# Patient Record
Sex: MALE | Race: WHITE | Employment: OTHER | URBAN - METROPOLITAN AREA
[De-identification: names, ages, dates, MRNs, and addresses within clinical notes are randomized per-mention and may not be internally consistent; named-entity substitution may affect disease eponyms.]

---

## 2017-02-06 DIAGNOSIS — E03.9 HYPOTHYROIDISM, UNSPECIFIED TYPE: ICD-10-CM

## 2017-02-24 ENCOUNTER — OFFICE VISIT (OUTPATIENT)
Dept: SURGERY | Age: 77
End: 2017-02-24

## 2017-02-24 VITALS
HEIGHT: 72 IN | BODY MASS INDEX: 31.15 KG/M2 | HEART RATE: 72 BPM | WEIGHT: 230 LBS | SYSTOLIC BLOOD PRESSURE: 112 MMHG | DIASTOLIC BLOOD PRESSURE: 70 MMHG

## 2017-02-24 DIAGNOSIS — I10 ESSENTIAL HYPERTENSION: ICD-10-CM

## 2017-02-24 DIAGNOSIS — E03.9 HYPOTHYROIDISM, UNSPECIFIED TYPE: ICD-10-CM

## 2017-02-24 DIAGNOSIS — B35.1 ONYCHOMYCOSIS: ICD-10-CM

## 2017-02-24 LAB — GLUCOSE BLD-MCNC: 142 MG/DL

## 2017-02-24 PROCEDURE — 1036F TOBACCO NON-USER: CPT | Performed by: INTERNAL MEDICINE

## 2017-02-24 PROCEDURE — 3044F HG A1C LEVEL LT 7.0%: CPT | Performed by: INTERNAL MEDICINE

## 2017-02-24 PROCEDURE — G8598 ASA/ANTIPLAT THER USED: HCPCS | Performed by: INTERNAL MEDICINE

## 2017-02-24 PROCEDURE — 1123F ACP DISCUSS/DSCN MKR DOCD: CPT | Performed by: INTERNAL MEDICINE

## 2017-02-24 PROCEDURE — G8427 DOCREV CUR MEDS BY ELIG CLIN: HCPCS | Performed by: INTERNAL MEDICINE

## 2017-02-24 PROCEDURE — G8484 FLU IMMUNIZE NO ADMIN: HCPCS | Performed by: INTERNAL MEDICINE

## 2017-02-24 PROCEDURE — 4040F PNEUMOC VAC/ADMIN/RCVD: CPT | Performed by: INTERNAL MEDICINE

## 2017-02-24 PROCEDURE — 99214 OFFICE O/P EST MOD 30 MIN: CPT | Performed by: INTERNAL MEDICINE

## 2017-02-24 PROCEDURE — G8417 CALC BMI ABV UP PARAM F/U: HCPCS | Performed by: INTERNAL MEDICINE

## 2017-02-24 PROCEDURE — 3017F COLORECTAL CA SCREEN DOC REV: CPT | Performed by: INTERNAL MEDICINE

## 2017-02-24 PROCEDURE — 82962 GLUCOSE BLOOD TEST: CPT | Performed by: INTERNAL MEDICINE

## 2017-02-24 RX ORDER — LEVOTHYROXINE SODIUM 137 UG/1
TABLET ORAL
Qty: 30 TABLET | Refills: 6 | Status: SHIPPED | OUTPATIENT
Start: 2017-02-24 | End: 2017-08-24 | Stop reason: SDUPTHER

## 2017-02-24 RX ORDER — SIMVASTATIN 20 MG
TABLET ORAL
Qty: 30 TABLET | Refills: 6 | Status: SHIPPED | OUTPATIENT
Start: 2017-02-24 | End: 2017-08-24 | Stop reason: SDUPTHER

## 2017-02-24 RX ORDER — CLOPIDOGREL BISULFATE 75 MG/1
75 TABLET ORAL DAILY
Qty: 30 TABLET | Refills: 6 | Status: SHIPPED | OUTPATIENT
Start: 2017-02-24 | End: 2017-08-24 | Stop reason: SDUPTHER

## 2017-02-24 RX ORDER — LISINOPRIL AND HYDROCHLOROTHIAZIDE 20; 12.5 MG/1; MG/1
TABLET ORAL
Qty: 30 TABLET | Refills: 6 | Status: SHIPPED | OUTPATIENT
Start: 2017-02-24 | End: 2017-08-24 | Stop reason: SDUPTHER

## 2017-03-05 ASSESSMENT — ENCOUNTER SYMPTOMS: EYES NEGATIVE: 1

## 2017-08-12 ENCOUNTER — HOSPITAL ENCOUNTER (OUTPATIENT)
Dept: ULTRASOUND IMAGING | Age: 77
Discharge: HOME | End: 2017-08-12
Payer: MEDICARE

## 2017-08-12 DIAGNOSIS — I65.23 BILATERAL CAROTID ARTERY STENOSIS: ICD-10-CM

## 2017-08-12 PROCEDURE — 93880 EXTRACRANIAL BILAT STUDY: CPT

## 2017-08-22 DIAGNOSIS — E11.69 UNCONTROLLED TYPE 2 DIABETES MELLITUS WITH OTHER SPECIFIED COMPLICATION, UNSPECIFIED LONG TERM INSULIN USE STATUS: ICD-10-CM

## 2017-08-22 DIAGNOSIS — E11.65 UNCONTROLLED TYPE 2 DIABETES MELLITUS WITH OTHER SPECIFIED COMPLICATION, UNSPECIFIED LONG TERM INSULIN USE STATUS: ICD-10-CM

## 2017-08-22 DIAGNOSIS — E03.9 HYPOTHYROIDISM, UNSPECIFIED TYPE: ICD-10-CM

## 2017-08-22 LAB
ALBUMIN SERPL-MCNC: 4.2 G/DL (ref 3.9–4.9)
ALP BLD-CCNC: 49 U/L (ref 35–104)
ALT SERPL-CCNC: 17 U/L (ref 0–41)
ANION GAP SERPL CALCULATED.3IONS-SCNC: 18 MEQ/L (ref 7–13)
AST SERPL-CCNC: 15 U/L (ref 0–40)
BILIRUB SERPL-MCNC: 0.6 MG/DL (ref 0–1.2)
BILIRUBIN DIRECT: 0 MG/DL (ref 0–0.3)
BILIRUBIN, INDIRECT: 0.6 MG/DL (ref 0–0.6)
BUN BLDV-MCNC: 17 MG/DL (ref 8–23)
CALCIUM SERPL-MCNC: 9.3 MG/DL (ref 8.6–10.2)
CHLORIDE BLD-SCNC: 94 MEQ/L (ref 98–107)
CHOLESTEROL, TOTAL: 187 MG/DL (ref 0–199)
CO2: 26 MEQ/L (ref 22–29)
CREAT SERPL-MCNC: 0.91 MG/DL (ref 0.7–1.2)
CREATININE URINE: 85.4 MG/DL
GFR AFRICAN AMERICAN: >60
GFR NON-AFRICAN AMERICAN: >60
GLUCOSE BLD-MCNC: 140 MG/DL (ref 74–109)
HDLC SERPL-MCNC: 48 MG/DL (ref 40–59)
LDL CHOLESTEROL CALCULATED: 114 MG/DL (ref 0–129)
MICROALBUMIN UR-MCNC: <1.2 MG/DL
MICROALBUMIN/CREAT UR-RTO: NORMAL MG/G (ref 0–30)
POTASSIUM SERPL-SCNC: 5.1 MEQ/L (ref 3.5–5.1)
SODIUM BLD-SCNC: 138 MEQ/L (ref 132–144)
T4 FREE: 1.15 NG/DL (ref 0.93–1.7)
TOTAL PROTEIN: 7.1 G/DL (ref 6.4–8.1)
TRIGL SERPL-MCNC: 127 MG/DL (ref 0–200)
TSH SERPL DL<=0.05 MIU/L-ACNC: 4.68 UIU/ML (ref 0.27–4.2)

## 2017-08-24 ENCOUNTER — OFFICE VISIT (OUTPATIENT)
Dept: SURGERY | Age: 77
End: 2017-08-24

## 2017-08-24 VITALS
SYSTOLIC BLOOD PRESSURE: 139 MMHG | DIASTOLIC BLOOD PRESSURE: 71 MMHG | HEART RATE: 62 BPM | BODY MASS INDEX: 31.69 KG/M2 | WEIGHT: 234 LBS | HEIGHT: 72 IN

## 2017-08-24 DIAGNOSIS — E03.9 HYPOTHYROIDISM, UNSPECIFIED TYPE: ICD-10-CM

## 2017-08-24 DIAGNOSIS — E78.5 HYPERLIPIDEMIA, UNSPECIFIED HYPERLIPIDEMIA TYPE: ICD-10-CM

## 2017-08-24 DIAGNOSIS — I10 ESSENTIAL HYPERTENSION: ICD-10-CM

## 2017-08-24 DIAGNOSIS — E11.65 UNCONTROLLED TYPE 2 DIABETES MELLITUS WITH OTHER SPECIFIED COMPLICATION, UNSPECIFIED LONG TERM INSULIN USE STATUS: Primary | ICD-10-CM

## 2017-08-24 DIAGNOSIS — E11.69 UNCONTROLLED TYPE 2 DIABETES MELLITUS WITH OTHER SPECIFIED COMPLICATION, UNSPECIFIED LONG TERM INSULIN USE STATUS: Primary | ICD-10-CM

## 2017-08-24 LAB
GLUCOSE BLD-MCNC: 139 MG/DL
HBA1C MFR BLD: 6.7 %

## 2017-08-24 PROCEDURE — 1036F TOBACCO NON-USER: CPT | Performed by: INTERNAL MEDICINE

## 2017-08-24 PROCEDURE — 82962 GLUCOSE BLOOD TEST: CPT | Performed by: INTERNAL MEDICINE

## 2017-08-24 PROCEDURE — G8417 CALC BMI ABV UP PARAM F/U: HCPCS | Performed by: INTERNAL MEDICINE

## 2017-08-24 PROCEDURE — 99213 OFFICE O/P EST LOW 20 MIN: CPT | Performed by: INTERNAL MEDICINE

## 2017-08-24 PROCEDURE — 4040F PNEUMOC VAC/ADMIN/RCVD: CPT | Performed by: INTERNAL MEDICINE

## 2017-08-24 PROCEDURE — 83036 HEMOGLOBIN GLYCOSYLATED A1C: CPT | Performed by: INTERNAL MEDICINE

## 2017-08-24 PROCEDURE — G8598 ASA/ANTIPLAT THER USED: HCPCS | Performed by: INTERNAL MEDICINE

## 2017-08-24 PROCEDURE — G8428 CUR MEDS NOT DOCUMENT: HCPCS | Performed by: INTERNAL MEDICINE

## 2017-08-24 PROCEDURE — 1123F ACP DISCUSS/DSCN MKR DOCD: CPT | Performed by: INTERNAL MEDICINE

## 2017-08-24 RX ORDER — LISINOPRIL AND HYDROCHLOROTHIAZIDE 20; 12.5 MG/1; MG/1
TABLET ORAL
Qty: 30 TABLET | Refills: 6 | Status: SHIPPED | OUTPATIENT
Start: 2017-08-24 | End: 2018-02-26 | Stop reason: SDUPTHER

## 2017-08-24 RX ORDER — SIMVASTATIN 20 MG
TABLET ORAL
Qty: 30 TABLET | Refills: 6 | Status: SHIPPED | OUTPATIENT
Start: 2017-08-24 | End: 2018-02-26 | Stop reason: SDUPTHER

## 2017-08-24 RX ORDER — CLOPIDOGREL BISULFATE 75 MG/1
75 TABLET ORAL DAILY
Qty: 30 TABLET | Refills: 6 | Status: SHIPPED | OUTPATIENT
Start: 2017-08-24 | End: 2018-02-26 | Stop reason: SDUPTHER

## 2017-08-24 RX ORDER — LEVOTHYROXINE SODIUM 137 UG/1
TABLET ORAL
Qty: 30 TABLET | Refills: 6 | Status: SHIPPED | OUTPATIENT
Start: 2017-08-24 | End: 2018-02-26 | Stop reason: SDUPTHER

## 2017-09-03 ASSESSMENT — ENCOUNTER SYMPTOMS: EYES NEGATIVE: 1

## 2018-02-23 DIAGNOSIS — E11.69 UNCONTROLLED TYPE 2 DIABETES MELLITUS WITH OTHER SPECIFIED COMPLICATION, UNSPECIFIED LONG TERM INSULIN USE STATUS: ICD-10-CM

## 2018-02-23 DIAGNOSIS — E03.9 HYPOTHYROIDISM, UNSPECIFIED TYPE: ICD-10-CM

## 2018-02-23 DIAGNOSIS — E11.65 UNCONTROLLED TYPE 2 DIABETES MELLITUS WITH OTHER SPECIFIED COMPLICATION, UNSPECIFIED LONG TERM INSULIN USE STATUS: ICD-10-CM

## 2018-02-23 LAB
ANION GAP SERPL CALCULATED.3IONS-SCNC: 14 MEQ/L (ref 7–13)
BUN BLDV-MCNC: 17 MG/DL (ref 8–23)
CALCIUM SERPL-MCNC: 9 MG/DL (ref 8.6–10.2)
CHLORIDE BLD-SCNC: 100 MEQ/L (ref 98–107)
CHOLESTEROL, TOTAL: 173 MG/DL (ref 0–199)
CO2: 26 MEQ/L (ref 22–29)
CREAT SERPL-MCNC: 1.1 MG/DL (ref 0.7–1.2)
GFR AFRICAN AMERICAN: >60
GFR NON-AFRICAN AMERICAN: >60
GLUCOSE BLD-MCNC: 148 MG/DL (ref 74–109)
HBA1C MFR BLD: 7 % (ref 4.8–5.9)
HDLC SERPL-MCNC: 45 MG/DL (ref 40–59)
LDL CHOLESTEROL CALCULATED: 108 MG/DL (ref 0–129)
POTASSIUM SERPL-SCNC: 4.5 MEQ/L (ref 3.5–5.1)
SODIUM BLD-SCNC: 140 MEQ/L (ref 132–144)
T4 FREE: 1.3 NG/DL (ref 0.93–1.7)
TRIGL SERPL-MCNC: 102 MG/DL (ref 0–200)
TSH SERPL DL<=0.05 MIU/L-ACNC: 1.75 UIU/ML (ref 0.27–4.2)

## 2018-02-26 ENCOUNTER — OFFICE VISIT (OUTPATIENT)
Dept: ENDOCRINOLOGY | Age: 78
End: 2018-02-26
Payer: MEDICARE

## 2018-02-26 VITALS
WEIGHT: 238 LBS | HEIGHT: 72 IN | OXYGEN SATURATION: 98 % | TEMPERATURE: 98 F | HEART RATE: 76 BPM | DIASTOLIC BLOOD PRESSURE: 76 MMHG | SYSTOLIC BLOOD PRESSURE: 138 MMHG | BODY MASS INDEX: 32.23 KG/M2

## 2018-02-26 DIAGNOSIS — E78.00 HYPERCHOLESTEROLEMIA: ICD-10-CM

## 2018-02-26 DIAGNOSIS — I10 ESSENTIAL HYPERTENSION: ICD-10-CM

## 2018-02-26 DIAGNOSIS — E11.69 UNCONTROLLED TYPE 2 DIABETES MELLITUS WITH OTHER SPECIFIED COMPLICATION, UNSPECIFIED LONG TERM INSULIN USE STATUS: Primary | ICD-10-CM

## 2018-02-26 DIAGNOSIS — E11.65 UNCONTROLLED TYPE 2 DIABETES MELLITUS WITH OTHER SPECIFIED COMPLICATION, UNSPECIFIED LONG TERM INSULIN USE STATUS: Primary | ICD-10-CM

## 2018-02-26 DIAGNOSIS — E03.9 HYPOTHYROIDISM, UNSPECIFIED TYPE: ICD-10-CM

## 2018-02-26 LAB — GLUCOSE BLD-MCNC: 200 MG/DL

## 2018-02-26 PROCEDURE — G8417 CALC BMI ABV UP PARAM F/U: HCPCS | Performed by: INTERNAL MEDICINE

## 2018-02-26 PROCEDURE — 1036F TOBACCO NON-USER: CPT | Performed by: INTERNAL MEDICINE

## 2018-02-26 PROCEDURE — G8598 ASA/ANTIPLAT THER USED: HCPCS | Performed by: INTERNAL MEDICINE

## 2018-02-26 PROCEDURE — 1123F ACP DISCUSS/DSCN MKR DOCD: CPT | Performed by: INTERNAL MEDICINE

## 2018-02-26 PROCEDURE — 99214 OFFICE O/P EST MOD 30 MIN: CPT | Performed by: INTERNAL MEDICINE

## 2018-02-26 PROCEDURE — G8427 DOCREV CUR MEDS BY ELIG CLIN: HCPCS | Performed by: INTERNAL MEDICINE

## 2018-02-26 PROCEDURE — 4040F PNEUMOC VAC/ADMIN/RCVD: CPT | Performed by: INTERNAL MEDICINE

## 2018-02-26 PROCEDURE — 82962 GLUCOSE BLOOD TEST: CPT | Performed by: INTERNAL MEDICINE

## 2018-02-26 PROCEDURE — G8484 FLU IMMUNIZE NO ADMIN: HCPCS | Performed by: INTERNAL MEDICINE

## 2018-02-26 RX ORDER — CLOPIDOGREL BISULFATE 75 MG/1
75 TABLET ORAL DAILY
Qty: 30 TABLET | Refills: 6 | Status: ON HOLD | OUTPATIENT
Start: 2018-02-26 | End: 2018-08-16 | Stop reason: HOSPADM

## 2018-02-26 RX ORDER — LEVOTHYROXINE SODIUM 137 UG/1
TABLET ORAL
Qty: 30 TABLET | Refills: 6 | Status: SHIPPED | OUTPATIENT
Start: 2018-02-26 | End: 2018-09-13 | Stop reason: SDUPTHER

## 2018-02-26 RX ORDER — LISINOPRIL AND HYDROCHLOROTHIAZIDE 20; 12.5 MG/1; MG/1
TABLET ORAL
Qty: 30 TABLET | Refills: 6 | Status: SHIPPED | OUTPATIENT
Start: 2018-02-26 | End: 2018-09-13 | Stop reason: SDUPTHER

## 2018-02-26 RX ORDER — SIMVASTATIN 20 MG
TABLET ORAL
Qty: 30 TABLET | Refills: 6 | Status: SHIPPED | OUTPATIENT
Start: 2018-02-26 | End: 2018-09-13 | Stop reason: SDUPTHER

## 2018-02-26 RX ORDER — CLOPIDOGREL BISULFATE 75 MG/1
75 TABLET ORAL DAILY
Qty: 30 TABLET | Refills: 6 | Status: SHIPPED | OUTPATIENT
Start: 2018-02-26 | End: 2018-02-26 | Stop reason: SDUPTHER

## 2018-02-26 RX ORDER — LEVOTHYROXINE SODIUM 137 UG/1
TABLET ORAL
Qty: 30 TABLET | Refills: 6 | Status: SHIPPED | OUTPATIENT
Start: 2018-02-26 | End: 2018-02-26 | Stop reason: SDUPTHER

## 2018-02-26 RX ORDER — SIMVASTATIN 20 MG
TABLET ORAL
Qty: 30 TABLET | Refills: 6 | Status: SHIPPED | OUTPATIENT
Start: 2018-02-26 | End: 2018-02-26 | Stop reason: SDUPTHER

## 2018-02-26 ASSESSMENT — ENCOUNTER SYMPTOMS: EYES NEGATIVE: 1

## 2018-02-26 NOTE — PROGRESS NOTES
normal. He has no wheezes. He has no rales. Musculoskeletal: Normal range of motion. Neurological: He is alert and oriented to person, place, and time. Skin: Skin is warm and dry. Psychiatric: He exhibits a depressed mood. Pt declined foot exam       Results for Carley Davidson (MRN [id number]) as of 9/3/2017 18:04   Ref. Range 8/22/2017 07:18   Sodium Latest Ref Range: 132 - 144 mEq/L 138   Potassium Latest Ref Range: 3.5 - 5.1 mEq/L 5.1   Chloride Latest Ref Range: 98 - 107 mEq/L 94 (L)   CO2 Latest Ref Range: 22 - 29 mEq/L 26   BUN Latest Ref Range: 8 - 23 mg/dL 17   Creatinine Latest Ref Range: 0.70 - 1.20 mg/dL 0.91   Anion Gap Latest Ref Range: 7 - 13 mEq/L 18 (H)   GFR Non- Latest Ref Range: >60  >60.0   GFR African American Latest Ref Range: >60  >60.0   Glucose Latest Ref Range: 74 - 109 mg/dL 140 (H)   Calcium Latest Ref Range: 8.6 - 10.2 mg/dL 9.3   Total Protein Latest Ref Range: 6.4 - 8.1 g/dL 7.1   Cholesterol, Total Latest Ref Range: 0 - 199 mg/dL 187   HDL Cholesterol Latest Ref Range: 40 - 59 mg/dL 48   LDL Calculated Latest Ref Range: 0 - 129 mg/dL 114   Triglycerides Latest Ref Range: 0 - 200 mg/dL 127   Albumin Latest Ref Range: 3.9 - 4.9 g/dL 4.2   Alk Phos Latest Ref Range: 35 - 104 U/L 49   ALT Latest Ref Range: 0 - 41 U/L 17   AST Latest Ref Range: 0 - 40 U/L 15   Bilirubin Latest Ref Range: 0.0 - 1.2 mg/dL 0.6   Bilirubin, Direct Latest Ref Range: 0.0 - 0.3 mg/dL 0.0   Bilirubin, Indirect Latest Ref Range: 0.0 - 0.6 mg/dL 0.6   TSH Latest Ref Range: 0.270 - 4.200 uIU/mL 4.680 (H)   T4 Free Latest Ref Range: 0.93 - 1.70 ng/dL 1.15   Creatinine, Ur Latest Ref Range: Not Established mg/dL 85.4   Microalb Creat Ratio Latest Ref Range: 0.0 - 30.0 mg/G see below   MICROALBUMIN, RANDOM URINE Latest Ref Range: Not Established mg/dL <1.20       Assessment:      1.  Uncontrolled type 2 diabetes mellitus with other specified complication, unspecified long term insulin use status ([facility]  POCT Glucose    Basic Metabolic Panel    Hemoglobin A1C    Lipid Panel    Microalbumin / Creatinine Urine Ratio    Hepatic Function Panel    Referral To Podiatry - ([facility]) Lionel Cordova DPM   2. Hypothyroidism, unspecified type  T4, Free    TSH without Reflex   3. Essential hypertension     4.  Hypercholesterolemia           Plan:     Orders Placed This Encounter   Medications    simvastatin (ZOCOR) 20 MG tablet     Sig: TAKE ONE TABLET BY MOUTH NIGHTLY     Dispense:  30 tablet     Refill:  06    metFORMIN (GLUCOPHAGE) 1000 MG tablet     Sig: TAKE ONE TABLET BY MOUTH TWICE DAILY WITH MEALS     Dispense:  60 tablet     Refill:  06    levothyroxine (SYNTHROID) 137 MCG tablet     Sig: TAKE ONE TABLET BY MOUTH ONCE DAILY     Dispense:  30 tablet     Refill:  06    clopidogrel (PLAVIX) 75 MG tablet     Sig: Take 1 tablet by mouth daily     Dispense:  30 tablet     Refill:  06       Orders Placed This Encounter   Procedures    T4, Free     Standing Status:   Future     Standing Expiration Date:   2/26/2019    TSH without Reflex     Standing Status:   Future     Standing Expiration Date:   2/26/2019    Basic Metabolic Panel     Standing Status:   Future     Standing Expiration Date:   2/26/2019    Hemoglobin A1C     Standing Status:   Future     Standing Expiration Date:   2/26/2019    Lipid Panel     Standing Status:   Future     Standing Expiration Date:   2/26/2019     Order Specific Question:   Is Patient Fasting?/# of Hours     Answer:   y    Microalbumin / Creatinine Urine Ratio     Standing Status:   Future     Standing Expiration Date:   2/26/2019    Hepatic Function Panel     Standing Status:   Future     Standing Expiration Date:   2/26/2019    Referral To Podiatry - ([facility]UR[facility]) Lionel Cordova DPM     Referral Priority:   Routine     Referral Type:   Consult for Advice and Opinion     Referral Reason:   Specialty Services Required     Referred to Provider:   Lionel Cordova DPM     Requested Specialty:   Podiatry     Number of Visits Requested:   1    POCT Glucose     F/u in 6 months  Diabetes education provided today:  More than 50 % of 25 min spend in pt education/counsleing review of labs and c0-ordination of care   Managing high and low sugar readings.   habic goal of 7 or lower

## 2018-08-14 ENCOUNTER — HOSPITAL ENCOUNTER (INPATIENT)
Age: 78
LOS: 3 days | Discharge: NURSING, REHAB OR LONG-TERM CARE | DRG: 086 | End: 2018-08-17
Attending: EMERGENCY MEDICINE | Admitting: INTERNAL MEDICINE
Payer: MEDICARE

## 2018-08-14 ENCOUNTER — APPOINTMENT (OUTPATIENT)
Dept: CT IMAGING | Age: 78
DRG: 086 | End: 2018-08-14
Payer: MEDICARE

## 2018-08-14 DIAGNOSIS — S01.81XA FACIAL LACERATION, INITIAL ENCOUNTER: Primary | ICD-10-CM

## 2018-08-14 DIAGNOSIS — S20.212A CONTUSION OF LEFT CHEST WALL, INITIAL ENCOUNTER: ICD-10-CM

## 2018-08-14 DIAGNOSIS — S00.83XA FACIAL CONTUSION, INITIAL ENCOUNTER: ICD-10-CM

## 2018-08-14 DIAGNOSIS — S02.2XXA CLOSED FRACTURE OF NASAL BONE, INITIAL ENCOUNTER: ICD-10-CM

## 2018-08-14 DIAGNOSIS — S06.5XAA SUBDURAL HEMATOMA: ICD-10-CM

## 2018-08-14 LAB
ABO/RH: NORMAL
ALBUMIN SERPL-MCNC: 4.2 G/DL (ref 3.9–4.9)
ALP BLD-CCNC: 53 U/L (ref 35–104)
ALT SERPL-CCNC: 22 U/L (ref 0–41)
ANION GAP SERPL CALCULATED.3IONS-SCNC: 16 MEQ/L (ref 7–13)
ANTIBODY SCREEN: NORMAL
AST SERPL-CCNC: 24 U/L (ref 0–40)
BASOPHILS ABSOLUTE: 0 K/UL (ref 0–0.2)
BASOPHILS RELATIVE PERCENT: 0.3 %
BILIRUB SERPL-MCNC: 0.5 MG/DL (ref 0–1.2)
BILIRUBIN URINE: NEGATIVE
BLOOD, URINE: NEGATIVE
BUN BLDV-MCNC: 14 MG/DL (ref 8–23)
CALCIUM SERPL-MCNC: 8.8 MG/DL (ref 8.6–10.2)
CHLORIDE BLD-SCNC: 91 MEQ/L (ref 98–107)
CLARITY: CLEAR
CO2: 23 MEQ/L (ref 22–29)
COLOR: YELLOW
CREAT SERPL-MCNC: 0.88 MG/DL (ref 0.7–1.2)
EKG ATRIAL RATE: 87 BPM
EKG P AXIS: 52 DEGREES
EKG P-R INTERVAL: 192 MS
EKG Q-T INTERVAL: 428 MS
EKG QRS DURATION: 152 MS
EKG QTC CALCULATION (BAZETT): 515 MS
EKG R AXIS: -36 DEGREES
EKG T AXIS: 122 DEGREES
EKG VENTRICULAR RATE: 87 BPM
EOSINOPHILS ABSOLUTE: 0 K/UL (ref 0–0.7)
EOSINOPHILS RELATIVE PERCENT: 0 %
GFR AFRICAN AMERICAN: >60
GFR NON-AFRICAN AMERICAN: >60
GLOBULIN: 2.9 G/DL (ref 2.3–3.5)
GLUCOSE BLD-MCNC: 214 MG/DL (ref 74–109)
GLUCOSE URINE: 250 MG/DL
HCT VFR BLD CALC: 42.5 % (ref 42–52)
HEMOGLOBIN: 14.8 G/DL (ref 14–18)
INR BLD: 1
KETONES, URINE: >=80 MG/DL
LEUKOCYTE ESTERASE, URINE: NEGATIVE
LYMPHOCYTES ABSOLUTE: 0.9 K/UL (ref 1–4.8)
LYMPHOCYTES RELATIVE PERCENT: 7 %
MCH RBC QN AUTO: 30.9 PG (ref 27–31.3)
MCHC RBC AUTO-ENTMCNC: 34.8 % (ref 33–37)
MCV RBC AUTO: 88.8 FL (ref 80–100)
MONOCYTES ABSOLUTE: 0.7 K/UL (ref 0.2–0.8)
MONOCYTES RELATIVE PERCENT: 5.3 %
NEUTROPHILS ABSOLUTE: 11.7 K/UL (ref 1.4–6.5)
NEUTROPHILS RELATIVE PERCENT: 87.4 %
NITRITE, URINE: NEGATIVE
PDW BLD-RTO: 13.8 % (ref 11.5–14.5)
PH UA: 5 (ref 5–9)
PLATELET # BLD: 240 K/UL (ref 130–400)
POC CREATININE WHOLE BLOOD: 1
POTASSIUM SERPL-SCNC: 4.5 MEQ/L (ref 3.5–5.1)
PROTEIN UA: NEGATIVE MG/DL
PROTHROMBIN TIME: 10.6 SEC (ref 9.6–12.3)
RBC # BLD: 4.78 M/UL (ref 4.7–6.1)
SODIUM BLD-SCNC: 130 MEQ/L (ref 132–144)
SPECIFIC GRAVITY UA: 1.03 (ref 1–1.03)
TOTAL PROTEIN: 7.1 G/DL (ref 6.4–8.1)
URINE REFLEX TO CULTURE: ABNORMAL
UROBILINOGEN, URINE: 0.2 E.U./DL
WBC # BLD: 13.4 K/UL (ref 4.8–10.8)

## 2018-08-14 PROCEDURE — 81003 URINALYSIS AUTO W/O SCOPE: CPT

## 2018-08-14 PROCEDURE — 6370000000 HC RX 637 (ALT 250 FOR IP): Performed by: NURSE PRACTITIONER

## 2018-08-14 PROCEDURE — 99285 EMERGENCY DEPT VISIT HI MDM: CPT

## 2018-08-14 PROCEDURE — 2000000000 HC ICU R&B

## 2018-08-14 PROCEDURE — 86850 RBC ANTIBODY SCREEN: CPT

## 2018-08-14 PROCEDURE — 74177 CT ABD & PELVIS W/CONTRAST: CPT

## 2018-08-14 PROCEDURE — 36415 COLL VENOUS BLD VENIPUNCTURE: CPT

## 2018-08-14 PROCEDURE — 2500000003 HC RX 250 WO HCPCS: Performed by: EMERGENCY MEDICINE

## 2018-08-14 PROCEDURE — 6370000000 HC RX 637 (ALT 250 FOR IP): Performed by: EMERGENCY MEDICINE

## 2018-08-14 PROCEDURE — 12011 RPR F/E/E/N/L/M 2.5 CM/<: CPT

## 2018-08-14 PROCEDURE — 6360000002 HC RX W HCPCS: Performed by: EMERGENCY MEDICINE

## 2018-08-14 PROCEDURE — 70450 CT HEAD/BRAIN W/O DYE: CPT

## 2018-08-14 PROCEDURE — 0HQ1XZZ REPAIR FACE SKIN, EXTERNAL APPROACH: ICD-10-PCS | Performed by: EMERGENCY MEDICINE

## 2018-08-14 PROCEDURE — 80053 COMPREHEN METABOLIC PANEL: CPT

## 2018-08-14 PROCEDURE — 86901 BLOOD TYPING SEROLOGIC RH(D): CPT

## 2018-08-14 PROCEDURE — 6360000004 HC RX CONTRAST MEDICATION: Performed by: EMERGENCY MEDICINE

## 2018-08-14 PROCEDURE — 2580000003 HC RX 258: Performed by: INTERNAL MEDICINE

## 2018-08-14 PROCEDURE — 96374 THER/PROPH/DIAG INJ IV PUSH: CPT

## 2018-08-14 PROCEDURE — 70486 CT MAXILLOFACIAL W/O DYE: CPT

## 2018-08-14 PROCEDURE — 71250 CT THORAX DX C-: CPT

## 2018-08-14 PROCEDURE — 85025 COMPLETE CBC W/AUTO DIFF WBC: CPT

## 2018-08-14 PROCEDURE — 2500000003 HC RX 250 WO HCPCS: Performed by: NEUROLOGICAL SURGERY

## 2018-08-14 PROCEDURE — 86900 BLOOD TYPING SEROLOGIC ABO: CPT

## 2018-08-14 PROCEDURE — 93005 ELECTROCARDIOGRAM TRACING: CPT

## 2018-08-14 PROCEDURE — 85610 PROTHROMBIN TIME: CPT

## 2018-08-14 RX ORDER — HYDRALAZINE HYDROCHLORIDE 20 MG/ML
10 INJECTION INTRAMUSCULAR; INTRAVENOUS
Status: DISCONTINUED | OUTPATIENT
Start: 2018-08-14 | End: 2018-08-17 | Stop reason: HOSPADM

## 2018-08-14 RX ORDER — SODIUM CHLORIDE 9 MG/ML
INJECTION, SOLUTION INTRAVENOUS CONTINUOUS
Status: DISCONTINUED | OUTPATIENT
Start: 2018-08-14 | End: 2018-08-15

## 2018-08-14 RX ORDER — 0.9 % SODIUM CHLORIDE 0.9 %
250 INTRAVENOUS SOLUTION INTRAVENOUS ONCE
Status: DISCONTINUED | OUTPATIENT
Start: 2018-08-14 | End: 2018-08-17 | Stop reason: HOSPADM

## 2018-08-14 RX ORDER — LABETALOL HYDROCHLORIDE 5 MG/ML
10 INJECTION, SOLUTION INTRAVENOUS
Status: DISCONTINUED | OUTPATIENT
Start: 2018-08-14 | End: 2018-08-17

## 2018-08-14 RX ORDER — HYDRALAZINE HYDROCHLORIDE 20 MG/ML
10 INJECTION INTRAMUSCULAR; INTRAVENOUS
Status: CANCELLED | OUTPATIENT
Start: 2018-08-14

## 2018-08-14 RX ORDER — ONDANSETRON 4 MG/1
4 TABLET, ORALLY DISINTEGRATING ORAL ONCE
Status: COMPLETED | OUTPATIENT
Start: 2018-08-14 | End: 2018-08-14

## 2018-08-14 RX ORDER — DIAPER,BRIEF,INFANT-TODD,DISP
EACH MISCELLANEOUS ONCE
Status: COMPLETED | OUTPATIENT
Start: 2018-08-14 | End: 2018-08-14

## 2018-08-14 RX ORDER — LIDOCAINE HYDROCHLORIDE AND EPINEPHRINE 10; 10 MG/ML; UG/ML
20 INJECTION, SOLUTION INFILTRATION; PERINEURAL ONCE
Status: COMPLETED | OUTPATIENT
Start: 2018-08-14 | End: 2018-08-14

## 2018-08-14 RX ORDER — HYDRALAZINE HYDROCHLORIDE 20 MG/ML
10 INJECTION INTRAMUSCULAR; INTRAVENOUS ONCE
Status: COMPLETED | OUTPATIENT
Start: 2018-08-14 | End: 2018-08-14

## 2018-08-14 RX ADMIN — LABETALOL HYDROCHLORIDE 10 MG: 5 INJECTION INTRAVENOUS at 23:34

## 2018-08-14 RX ADMIN — IOPAMIDOL 100 ML: 755 INJECTION, SOLUTION INTRAVENOUS at 17:29

## 2018-08-14 RX ADMIN — BACITRACIN ZINC 1 G: 500 OINTMENT TOPICAL at 18:57

## 2018-08-14 RX ADMIN — HYDRALAZINE HYDROCHLORIDE 10 MG: 20 INJECTION INTRAMUSCULAR; INTRAVENOUS at 23:33

## 2018-08-14 RX ADMIN — ONDANSETRON 4 MG: 4 TABLET, ORALLY DISINTEGRATING ORAL at 20:53

## 2018-08-14 RX ADMIN — SODIUM CHLORIDE: 9 INJECTION, SOLUTION INTRAVENOUS at 22:58

## 2018-08-14 RX ADMIN — PHENYLEPHRINE HYDROCHLORIDE 1 SPRAY: 1 SPRAY NASAL at 21:13

## 2018-08-14 RX ADMIN — HYDRALAZINE HYDROCHLORIDE 10 MG: 20 INJECTION INTRAMUSCULAR; INTRAVENOUS at 20:55

## 2018-08-14 RX ADMIN — LIDOCAINE HYDROCHLORIDE AND EPINEPHRINE 20 ML: 10; 10 INJECTION, SOLUTION INFILTRATION; PERINEURAL at 18:10

## 2018-08-14 RX ADMIN — HYDRALAZINE HYDROCHLORIDE 10 MG: 20 INJECTION INTRAMUSCULAR; INTRAVENOUS at 22:06

## 2018-08-14 ASSESSMENT — ENCOUNTER SYMPTOMS
NAUSEA: 0
SHORTNESS OF BREATH: 1
SORE THROAT: 0
VOMITING: 0
EYE PAIN: 0
FACIAL SWELLING: 1
TROUBLE SWALLOWING: 0
ABDOMINAL PAIN: 0
DOUBLE VISION: 0
COUGH: 0
HEARTBURN: 0
CHEST TIGHTNESS: 0
PHOTOPHOBIA: 0
BLURRED VISION: 0
ABDOMINAL DISTENTION: 0
VOICE CHANGE: 0

## 2018-08-14 ASSESSMENT — PAIN DESCRIPTION - PAIN TYPE: TYPE: ACUTE PAIN

## 2018-08-14 ASSESSMENT — PAIN SCALES - GENERAL
PAINLEVEL_OUTOF10: 0
PAINLEVEL_OUTOF10: 6
PAINLEVEL_OUTOF10: 0
PAINLEVEL_OUTOF10: 6

## 2018-08-14 ASSESSMENT — PAIN DESCRIPTION - LOCATION: LOCATION: CHEST

## 2018-08-14 ASSESSMENT — PAIN DESCRIPTION - ORIENTATION: ORIENTATION: LEFT;RIGHT

## 2018-08-14 NOTE — ED NOTES
Wounds cleansed with NS. Right eyebrow appears a little open. No active bleeding noted. Tolerated all well. Dr Trey Chery made aware.       Francis Claros RN  08/14/18 8184

## 2018-08-14 NOTE — ED TRIAGE NOTES
A & Ox4. Skin pink warm and dry. States he mowed the lawn this morning and overdid it. States he got dizzy in his driveway and fell face first onto the driveway around 4597. Wife got home around 1100 but pt didn't want to go to the hospital at that time. Pt took ASA 325mg x 2 for pain at 1130. Pt also on Plavix and ASA 81mg and took both this morning. Denies LOC. Pts right eye is ecchymotic all around it and almost swollen shut. States having some blurred vision in that eye. Superficial abrasions noted to right eyebrow, right side of forehead, nose, both knees, chin, lower lip. Ecchymosis noted inside inner lower lip. Large area of deep purple ecchymosis noted to left chest. Abrasion noted to umbilicus. Pt has ecchymosis and edema noted to left inner forearm/wrist but pt states he has no pain there. Complains of pain to right foot. No edema/ecchymosis noted to dorsal aspect of right foot. Pedal pulse palpable. MSP's intact x 4 extremities. States having feelings of choking when he lays back since this happened. Wife states he had clots of blood coming out of his left nostril.

## 2018-08-14 NOTE — ED PROVIDER NOTES
[facility] ED  eMERGENCY dEPARTMENT eNCOUnter      Pt Name: Anant Kruger  MRN: [id number]  Nohemigfdemetrice 1940  Date of evaluation: 8/14/2018  Provider: Guerline Guerra DO    CHIEF COMPLAINT       Chief Complaint   Patient presents with    Fall     Tripped in the driveway around 8945 today. On ASA and Plavix         HISTORY OF PRESENT ILLNESS   (Location/Symptom, Timing/Onset, Context/Setting, Quality, Duration, Modifying Factors, Severity)  Note limiting factors. Anant Kruger is a 69 y.o. male who presents to the emergency department . Patient presents after tripping face first on the driveway at 19:64 AM.  Prior to that he was doing some lawn work. He became dizzy almost like a spinning sensation and that is when he fell. Patient has history of a stroke in the past which has given him episodes of dizziness. This was not any different than previously. Patient did hit his head but did not lose consciousness. He does have a frontal headache and bilateral black eyes. He also hit his chest on the ground and is having some pain in the chest and some shortness of breath. Patient is on aspirin and Plavix    HPI    Nursing Notes were reviewed. REVIEW OF SYSTEMS    (2-9 systems for level 4, 10 or more for level 5)     Review of Systems   Constitutional: Negative for activity change, appetite change and fatigue. HENT: Positive for facial swelling. Negative for congestion, sore throat, trouble swallowing and voice change. Eyes: Negative for pain and visual disturbance. Respiratory: Positive for shortness of breath. Negative for chest tightness. Cardiovascular: Positive for chest pain. Gastrointestinal: Negative for abdominal distention, abdominal pain, nausea and vomiting. Endocrine: Negative for polydipsia. Genitourinary: Negative for flank pain and urgency. Musculoskeletal: Negative for gait problem and neck stiffness. Skin: Negative for rash.    Neurological: Positive for trauma    Interpretation per the Radiologist below, if available at the time of this note:    CT CHEST [address]    (Results Pending)   CT ABDOMEN PELVIS W IV CONTRAST Additional Contrast? None    (Results Pending)         ED BEDSIDE ULTRASOUND:   Performed by ED Physician - none    LABS:  Labs Reviewed   POCT CREATININE - Normal   CBC WITH AUTO DIFFERENTIAL   COMPREHENSIVE METABOLIC PANEL   URINE RT REFLEX TO CULTURE   PROTIME-INR   TYPE AND SCREEN       All other labs were within normal range or not returned as of this dictation. EMERGENCY DEPARTMENT COURSE and DIFFERENTIAL DIAGNOSIS/MDM:   Vitals:    Vitals:    08/14/18 1631 08/14/18 1715   BP: (!) 171/85 (!) 168/86   Pulse: 91 87   Resp: 18 18   Temp: 97.4 °F (36.3 °C)    TempSrc: Oral    SpO2: 98% 97%   Weight: 240 lb (108.9 kg)    Height: 6' (1.829 m)        Patient presented after getting dizzy and falling on his driveway. Patient was neurologically intact and his vitals are stable but he does have a subdural hematoma in the setting of taking aspirin and Plavix. He does not have a midline shift. He also has closed nasal fractures and chest wall contusion. Patient will be admitted to intensive care. We will control his blood pressure with hydralazine IV and we'll give him a platelet transfusion. He will have repeat CAT scan in a few hours to make sure that his subdural is not worsening. . I discussed this case with Dr Giancarlo Wilkerson, Neurosurgery at 8:30 PM.  He asked that patient be admitted to ICU under the hospitalist.  I spoke to Dr Viki Roche and he is aware of the above plan. MDM      REASSESSMENT          CRITICAL CARE TIME   Total Critical Care time was 45 minutes, excluding separately reportable procedures. There was a high probability of clinically significant/life threatening deterioration in the patient's condition which required my urgent intervention.       CONSULTS:  None    PROCEDURES:  Unless otherwise noted below, none     Lac

## 2018-08-14 NOTE — ED NOTES
Ice bag applied to right eye and also to left forearm and left chest     Sky Randolph RN  08/14/18 4372

## 2018-08-15 ENCOUNTER — APPOINTMENT (OUTPATIENT)
Dept: CT IMAGING | Age: 78
DRG: 086 | End: 2018-08-15
Payer: MEDICARE

## 2018-08-15 ENCOUNTER — APPOINTMENT (OUTPATIENT)
Dept: ULTRASOUND IMAGING | Age: 78
DRG: 086 | End: 2018-08-15
Payer: MEDICARE

## 2018-08-15 LAB
ANION GAP SERPL CALCULATED.3IONS-SCNC: 15 MEQ/L (ref 7–13)
ANISOCYTOSIS: ABNORMAL
BASOPHILS ABSOLUTE: 0 K/UL (ref 0–0.2)
BASOPHILS RELATIVE PERCENT: 0.2 %
BLOOD BANK DISPENSE STATUS: NORMAL
BLOOD BANK PRODUCT CODE: NORMAL
BPU ID: NORMAL
BUN BLDV-MCNC: 13 MG/DL (ref 8–23)
CALCIUM SERPL-MCNC: 8.4 MG/DL (ref 8.6–10.2)
CHLORIDE BLD-SCNC: 96 MEQ/L (ref 98–107)
CO2: 25 MEQ/L (ref 22–29)
CREAT SERPL-MCNC: 0.86 MG/DL (ref 0.7–1.2)
DESCRIPTION BLOOD BANK: NORMAL
EOSINOPHILS ABSOLUTE: 0 K/UL (ref 0–0.7)
EOSINOPHILS RELATIVE PERCENT: 0.5 %
GFR AFRICAN AMERICAN: >60
GFR AFRICAN AMERICAN: >60
GFR NON-AFRICAN AMERICAN: >60
GFR NON-AFRICAN AMERICAN: >60
GLUCOSE BLD-MCNC: 140 MG/DL (ref 74–109)
HCT VFR BLD CALC: 38.3 % (ref 42–52)
HCT VFR BLD CALC: 38.7 % (ref 42–52)
HCT VFR BLD CALC: 45.3 % (ref 42–52)
HEMOGLOBIN: 12.8 G/DL (ref 14–18)
HEMOGLOBIN: 13.3 G/DL (ref 14–18)
HEMOGLOBIN: 15.1 G/DL (ref 14–18)
LYMPHOCYTES ABSOLUTE: 0.8 K/UL (ref 1–4.8)
LYMPHOCYTES RELATIVE PERCENT: 6 %
MCH RBC QN AUTO: 30.6 PG (ref 27–31.3)
MCHC RBC AUTO-ENTMCNC: 34.3 % (ref 33–37)
MCV RBC AUTO: 89.3 FL (ref 80–100)
MONOCYTES ABSOLUTE: 0.9 K/UL (ref 0.2–0.8)
MONOCYTES RELATIVE PERCENT: 8.6 %
NEUTROPHILS ABSOLUTE: 8.1 K/UL (ref 1.4–6.5)
NEUTROPHILS RELATIVE PERCENT: 84 %
PDW BLD-RTO: 14.1 % (ref 11.5–14.5)
PERFORMED ON: NORMAL
PLATELET # BLD: 286 K/UL (ref 130–400)
PLATELET SLIDE REVIEW: ADEQUATE
POC CREATININE: 1 MG/DL (ref 0.8–1.3)
POC SAMPLE TYPE: NORMAL
POTASSIUM SERPL-SCNC: 4 MEQ/L (ref 3.5–5.1)
PROLYMPHOCYTES: 2 %
RBC # BLD: 4.33 M/UL (ref 4.7–6.1)
SLIDE REVIEW: ABNORMAL
SMUDGE CELLS: 1.9
SODIUM BLD-SCNC: 136 MEQ/L (ref 132–144)
WBC # BLD: 9.6 K/UL (ref 4.8–10.8)

## 2018-08-15 PROCEDURE — 36415 COLL VENOUS BLD VENIPUNCTURE: CPT

## 2018-08-15 PROCEDURE — 85018 HEMOGLOBIN: CPT

## 2018-08-15 PROCEDURE — 80048 BASIC METABOLIC PNL TOTAL CA: CPT

## 2018-08-15 PROCEDURE — 6370000000 HC RX 637 (ALT 250 FOR IP): Performed by: INTERNAL MEDICINE

## 2018-08-15 PROCEDURE — 85025 COMPLETE CBC W/AUTO DIFF WBC: CPT

## 2018-08-15 PROCEDURE — 85014 HEMATOCRIT: CPT

## 2018-08-15 PROCEDURE — P9034 PLATELETS, PHERESIS: HCPCS

## 2018-08-15 PROCEDURE — 70450 CT HEAD/BRAIN W/O DYE: CPT

## 2018-08-15 PROCEDURE — 2000000000 HC ICU R&B

## 2018-08-15 PROCEDURE — G8979 MOBILITY GOAL STATUS: HCPCS

## 2018-08-15 PROCEDURE — G8978 MOBILITY CURRENT STATUS: HCPCS

## 2018-08-15 PROCEDURE — 93880 EXTRACRANIAL BILAT STUDY: CPT

## 2018-08-15 PROCEDURE — 6360000002 HC RX W HCPCS: Performed by: EMERGENCY MEDICINE

## 2018-08-15 PROCEDURE — 36430 TRANSFUSION BLD/BLD COMPNT: CPT

## 2018-08-15 RX ORDER — ACETAMINOPHEN 325 MG/1
650 TABLET ORAL EVERY 4 HOURS PRN
Status: DISCONTINUED | OUTPATIENT
Start: 2018-08-15 | End: 2018-08-17 | Stop reason: HOSPADM

## 2018-08-15 RX ORDER — SIMVASTATIN 20 MG
20 TABLET ORAL NIGHTLY
Status: DISCONTINUED | OUTPATIENT
Start: 2018-08-15 | End: 2018-08-17 | Stop reason: HOSPADM

## 2018-08-15 RX ORDER — LEVOTHYROXINE SODIUM 137 UG/1
137 TABLET ORAL DAILY
Status: DISCONTINUED | OUTPATIENT
Start: 2018-08-16 | End: 2018-08-17 | Stop reason: HOSPADM

## 2018-08-15 RX ORDER — TRAMADOL HYDROCHLORIDE 50 MG/1
50 TABLET ORAL EVERY 6 HOURS PRN
Status: DISCONTINUED | OUTPATIENT
Start: 2018-08-15 | End: 2018-08-17 | Stop reason: HOSPADM

## 2018-08-15 RX ORDER — IBUPROFEN 800 MG/1
400 TABLET ORAL EVERY 6 HOURS PRN
Status: DISCONTINUED | OUTPATIENT
Start: 2018-08-15 | End: 2018-08-15

## 2018-08-15 RX ADMIN — HYDRALAZINE HYDROCHLORIDE 10 MG: 20 INJECTION INTRAMUSCULAR; INTRAVENOUS at 00:32

## 2018-08-15 RX ADMIN — TRAMADOL HYDROCHLORIDE 50 MG: 50 TABLET, FILM COATED ORAL at 22:08

## 2018-08-15 RX ADMIN — SIMVASTATIN 20 MG: 20 TABLET, FILM COATED ORAL at 22:11

## 2018-08-15 RX ADMIN — ACETAMINOPHEN 650 MG: 325 TABLET ORAL at 22:08

## 2018-08-15 RX ADMIN — IBUPROFEN 400 MG: 800 TABLET ORAL at 13:54

## 2018-08-15 RX ADMIN — HYDRALAZINE HYDROCHLORIDE 10 MG: 20 INJECTION INTRAMUSCULAR; INTRAVENOUS at 22:09

## 2018-08-15 ASSESSMENT — PAIN SCALES - GENERAL
PAINLEVEL_OUTOF10: 0
PAINLEVEL_OUTOF10: 4
PAINLEVEL_OUTOF10: 0
PAINLEVEL_OUTOF10: 3
PAINLEVEL_OUTOF10: 0
PAINLEVEL_OUTOF10: 3
PAINLEVEL_OUTOF10: 2
PAINLEVEL_OUTOF10: 0

## 2018-08-15 ASSESSMENT — PAIN DESCRIPTION - LOCATION: LOCATION: HEAD

## 2018-08-15 ASSESSMENT — PAIN DESCRIPTION - DESCRIPTORS: DESCRIPTORS: DULL

## 2018-08-15 ASSESSMENT — PAIN DESCRIPTION - ORIENTATION: ORIENTATION: OUTER

## 2018-08-15 ASSESSMENT — PAIN DESCRIPTION - PAIN TYPE: TYPE: ACUTE PAIN

## 2018-08-15 ASSESSMENT — PAIN DESCRIPTION - FREQUENCY: FREQUENCY: CONTINUOUS

## 2018-08-15 NOTE — PROGRESS NOTES
Physical Therapy Med Surg Initial Assessment  Facility/Department: [facility]  Room: [place] [address]       NAME: Nicole Kruger  : 1940 (69 y.o.)  MRN: [id number]  CODE STATUS: No Order    Date of Service: 8/15/2018    Patient Diagnosis(es): Post-traumatic subdural hematoma, without loss of consciousness, subsequent encounter [S06.5X0D]   Chief Complaint   Patient presents with    Fall     Tripped in the driveway around 4346 today. On ASA and Plavix     Patient Active Problem List    Diagnosis Date Noted    Post-traumatic subdural hematoma ([facility] 2018    Acute CVA (cerebrovascular accident) ([facility] 2015    Impaired mobility and activities of daily living 2015    Type II diabetes mellitus, uncontrolled ([facility] 2012    Hypothyroidism 2012    Hypertension 2012    Hyperlipidemia 2012        Past Medical History:   Diagnosis Date    Hyperlipidemia     Hypothyroidism     Type II or unspecified type diabetes mellitus without mention of complication, not stated as uncontrolled      History reviewed. No pertinent surgical history. Chart Reviewed: Yes  Patient assessed for rehabilitation services?: Yes  Family / Caregiver Present: No    Restrictions:  Restrictions/Precautions: Fall Risk     SUBJECTIVE: Subjective: Pt reports that he has experienced increased difficulty with homemaking chores and decribes a decline in LE strength, functional endurance and impaired balance with hx of recent fall leading to this admission. Pt reports that he is at 75% of his functional baseline  Pre Treatment Pain Screening  Pain at present: 4  Scale Used: Numeric Score  Intervention List: Patient able to continue with treatment  Comments / Details: \"they just gave me a motrin. \"    Post Treatment Pain Screening:   Pain Screening  Patient Currently in Pain: Yes  Pain Assessment  Pain Assessment: 0-10  Pain Level: 4  Pain Descriptors: Dull  Pain Frequency: Continuous    Prior Level of Function:  Social/Functional History  Lives With: Spouse  Type of Home: House  Home Layout: Two level (20 with hand rails)  Home Access: Stairs to enter with rails  Entrance Stairs - Number of Steps: 3  Entrance Stairs - Rails: Right  Bathroom Shower/Tub: Walk-in shower  Bathroom Equipment: Shower chair  Home Equipment:  (no DME)  ADL Assistance: [address]: [address] [facility] Responsibilities: Yes (shared)  Ambulation Assistance: Independent  Transfer Assistance: Independent    OBJECTIVE:   Vision/Hearing:  Vision:  (denies changes or double vision)  Hearing: Within functional limits    Cognition:  Overall Orientation Status: Within Functional Limits  Follows Commands: Within Functional Limits    Observation/Palpation  Observation: bruises round B orbits    ROM:       Strength:  Strength RLE  Comment: 4/5 grossly assessed  Strength LLE  Comment: 4/5 grossly assessed    Neuro:  Balance  Sitting - Static: Good  Sitting - Dynamic: Good;-  Standing - Static: Fair (increased postural sway requires 1-2 UE support)  Standing - Dynamic: Fair;- (2 LOB when amb with min A for balance recovery)  Comments: static standing with no UE support x21 seconds     Motor Control  Gross Motor?: WFL  Sensation  Overall Sensation Status: WFL    Bed mobility  Supine to Sit: Stand by assistance  Sit to Supine: Stand by assistance  Comment: close stand by. HOB elevated d/t aspiration precautions    Transfers  Sit to Stand: Contact guard assistance  Stand to sit: Contact guard assistance  Comment: x 3 trials from EOB and bedside chair. VC to improve indep and safe sequencing with 2ww. good follow through    Ambulation  Ambulation?: Yes  More Ambulation?: Yes  Ambulation 1  Surface: level tile  Device: Rolling Walker  Assistance: Minimal assistance  Distance: 25ft x 2  Comments: 1 LOB with turning requiring Min A to maintain balance. VC for safety with 2ww and for line management.    Ambulation 2  Surface - 2: level tile  Device 2: No device  Assistance 2: Minimal assistance  Quality of Gait 2: variable step length, narrow BASIM, decreased gait speed  Distance: 5ft, not safe at this time to continue amb without AD  Comments: Pt with LOB requiring Min A when amb with out AD    Activity Tolerance  Activity Tolerance: Patient Tolerated treatment well          ASSESSMENT:   Body structures, Functions, Activity limitations: Decreased functional mobility ; Decreased strength;Decreased endurance;Decreased balance;Decreased ROM; Decreased safe awareness  Decision Making: High Complexity  History: high  Exam: high  Clinical Presentation: high    Prognosis: Good  Patient Education: Pt educated in role of acute care PT, PT POC, benefits of mobility while in house, safety techniques, use of call light for assistance, d/c planning, d/c recommendation. , benefits of rehab program  Barriers to Learning: none    DISCHARGE RECOMMENDATIONS:  Discharge Recommendations: Continue to assess pending progress, Patient would benefit from continued therapy after discharge    Assessment: Pt presents with hx of recent fall. Pt reports difficulty with balance and LE strength. On this date, pt demonstrates the above deficits and decline in functional mobility status placing them at increased risk for falls and further decline in function. Pt would benefit from physical therapy to address above deficits and allow for safe return home at highest level of function, decrease risk for falls, and improve QOL.     REQUIRES PT FOLLOW UP: Yes      PLAN OF CARE:  Plan  Times per week: 3-6  Times per day: Daily  Current Treatment Recommendations: Strengthening, Functional Mobility Training, Neuromuscular Re-education, Home Exercise Program, Equipment Evaluation, Education, & procurement, Modalities, Safety Education & Training, Gait Training, Transfer Training, Balance Training, Endurance Training, Stair training, Patient/Caregiver Education & Training, Positioning  Safety Devices  Type of devices: All fall risk precautions in place    G-Code:  PT G-Codes  Functional Assessment Tool Used: clinical judgement  Functional Limitation: Mobility: Walking and moving around  Mobility: Walking and Moving Around Current Status (): At least 40 percent but less than 60 percent impaired, limited or restricted  Mobility: Walking and Moving Around Goal Status ():  At least 1 percent but less than 20 percent impaired, limited or restricted    Goals:  Patient goals : \"go home, improve balance, legs stronger\"  Short term goals  Short term goal 1: indep with HEP  Short term goal 2: standing balance activities with SBA and <1 LOB  Long term goals  Long term goal 1: indep with bed mobility   Long term goal 2: functional transfers with indep   Long term goal 3: amb >150ft with LRAD and mod I  Long term goal 4: 12 steps with handrail and Mod I    AMPAC (6 CLICK) BASIC MOBILITY  AM-PAC Inpatient Mobility Raw Score : 17     Therapy Time:   Individual   Time In 1430   Time Out 1448   Minutes [address], [address] [facility], 08/15/18 at 2:59 PM

## 2018-08-15 NOTE — H&P
Stephanie Kruger is an 69 y.o.  male. H/o CVA 3 years ago on plavix and ASA, was mowing his lawn and felt week and fell down on his face, pt states that he tripped in the driveway around 10 30 am. pt states he did not loose consciousness, no seizure like activity,  Pt states that since his stroke he has been weak and unsteady on his legs, he is been compliant with his meds for CVA. , pt was     Past Medical History:   Diagnosis Date    Hyperlipidemia     Hypothyroidism     Type II or unspecified type diabetes mellitus without mention of complication, not stated as uncontrolled      History reviewed. No pertinent surgical history. Allergies: No Known Allergies  History reviewed. No pertinent family history. Social History     Tobacco History     Smoking Status  Former Smoker    Smokeless Tobacco Use  Never Used          Alcohol History     Alcohol Use Status  Yes Comment  socially          Drug Use     Drug Use Status  No          Sexual Activity     Sexually Active  Not Asked                Active Problems:    Post-traumatic subdural hematoma (HCC)  Resolved Problems:    * No resolved hospital problems. *    Blood pressure (!) 163/74, pulse 84, temperature 97.4 °F (36.3 °C), temperature source Oral, resp. rate 18, height 6' (1.829 m), weight 240 lb (108.9 kg), SpO2 97 %. Review of Systems   Constitutional: Positive for malaise/fatigue. Negative for chills and fever. HENT: Negative for hearing loss and tinnitus. Eyes: Negative for blurred vision, double vision and photophobia. Respiratory: Negative for cough. Cardiovascular: Negative for chest pain and palpitations. Gastrointestinal: Negative for heartburn and nausea. Genitourinary: Negative for dysuria and urgency. Musculoskeletal: Negative for myalgias. Skin: Negative for rash. Neurological: Positive for dizziness and weakness. Psychiatric/Behavioral: Negative for depression.        Physical Exam   Constitutional: He is oriented to person, place, and time. He appears well-developed and well-nourished. HENT:   Laceration above the right eye,    Eyes: Pupils are equal, round, and reactive to light. Conjunctivae are normal.   Neck: No JVD present. No thyromegaly present. Cardiovascular: Normal rate. No murmur heard. Pulmonary/Chest: No respiratory distress. He has no wheezes. Abdominal: He exhibits no distension. There is no tenderness. Musculoskeletal: He exhibits no edema. Neurological: He is alert and oriented to person, place, and time. No cranial nerve deficit.    Skin:   Bruising of the chest and b/l knees and face     Lab Results   Component Value Date    WBC 13.4 (H) 08/14/2018    HGB 14.8 08/14/2018    HCT 42.5 08/14/2018    MCV 88.8 08/14/2018     08/14/2018     Lab Results   Component Value Date     08/14/2018    K 4.5 08/14/2018    CL 91 08/14/2018    CO2 23 08/14/2018    BUN 14 08/14/2018    CREATININE 0.88 08/14/2018    GLUCOSE 214 08/14/2018    CALCIUM 8.8 08/14/2018        Assessment:  Traumatic 9mm subdural hematoma  H/o CVA  HTN  B/l Nasal bone fractures  DM 2  Hyponatremia  Plan:  Transfuse 1 unit of platelets  ICU admission  NPO  Repeat CT head at 12 midnight   Neurosurgery consult  Hydralazine prn to keep systolic EY<467  ENT eval  SSI  IVF  Joselin Snell MD  8/14/2018

## 2018-08-15 NOTE — PROGRESS NOTES
2200 Escorted patient from ED to ICU. Patient removed from ED monitoring and placed on ICU monitor. BP is 179/60, medicated patient with Hydralazine per MAR. Will monitor BP every 15 minutes for response. Admission completed, shift assessment completed, see neuro check documentation. 2006 Blood bank call to question orders about platelet transfusion. Per lab she will order them. 2250 Platelets are not yet available. Dr. Crystal Crawford called back. Discussed BP with him, new orders received. Will continue to monitor. 2300 Medicated per MAr for BP.    0000 To CT scan. Platelets ready. First bag given. Second bag has a leak in it. Blood bank notified. Bag sent bag to blood bank. New bag ordered. 0100 CT scan results in. No new findings per CT report. 0200 Platelet transfusion completed, no s/sx of rxn noted. Patient tolerated well.    0300 VSS.    0400 Reassessment completed, see notes. No changes noted. No changes in neuro status. 0600 To CT scan, patient tolerated well.  Will report off to day shift starting at 0700

## 2018-08-15 NOTE — ED NOTES
Pt stable, icu rn at bedside, report in progress, pt stable, 0 c/o, 0 pain, a&ox4, consent for platelets administration obt.       Chester Jeong RN  08/14/18 3473

## 2018-08-15 NOTE — CONSULTS
signs.  CARDIOVASCULAR SYSTEM:  S1 and S2 are normal.  No murmurs appreciated. No  carotid bruits. RESPIRATORY SYSTEM:  Clear to auscultation. CNS EXAMINATION:  He is awake and alert and oriented to self, place, month,  and year. Pupils are equal and reactive. Eye movements are conjugate. Speech is normal.  Tongue is midline. Palate moves symmetrically. EXTREMITIES:  Examination reveals no pronator drift. Fine finger movements  are symmetrical.  Strength is 5/5. Reflexes are 2+. We did not attempt to  walk him yet. IMAGING:  His CT scan of the head shows an acute subdural hematoma present  along the left tentorium and falx measuring about 9 mm at the greatest  thickness and 3 mm at the level of the falx on the left. No midline shift  is notable. A small right scalp hematoma is notable. IMPRESSION:  Traumatic subdural hematoma with possibility of underlying  subarachnoid hemorrhage. The patient does not have any basal skull  fracture signs. This appeared to be a mechanical fall and therefore no  other intervention is recommended. We would hold his aspirin and Plavix  for four weeks, depending on what we see in the repeat scans. The patient  has high risk for cerebrovascular disease and stroke, and therefore earlier  it is reinstituted, the better it is. We will discuss this once he is out  of the ICU. We will see what his balance looks like. I think he was due  to have his carotid ultrasound done this month. We will obtain this while  he is here. I follow him as an outpatient on a regular basis. The patient has risk factors for cerebrovascular disease with hypertension,  hyperlipidemia, and diabetes. Followed by Dr. Yves Olguin as well. Thank you Dr. Arslan Green for asking us to assist in the care of this patient.         Isidoro Grant MD    D: 08/15/2018 8:46:03       T: 08/15/2018 11:52:50     [email or web address]  Job#: [id number]     Doc#: [id number]    CC:

## 2018-08-15 NOTE — ED NOTES
Per dr. Qian Roger ok to give ice chips to pt. Pt wilda. Well, pt a&ox4.      Ramon Freire RN  08/14/18 2053

## 2018-08-15 NOTE — PROGRESS NOTES
No new complaints. No nausea, vomiting, chest pain, or headache     BP (!) 118/51   Pulse 83   Temp 97.8 °F (36.6 °C)   Resp 19   Ht 6' (1.829 m)   Wt 240 lb (108.9 kg)   SpO2 96%   BMI 32.55 kg/m²     Physical Exam   Constitutional: He is oriented to person, place, and time and well-developed, well-nourished, and in no distress. Eyes: Conjunctivae and EOM are normal.   Neck: Normal range of motion. Neck supple. Neurological: He is alert and oriented to person, place, and time. Skin: Skin is warm and dry. S/p mech fall with subdural hematoma: neurosurg and neurology following. Htn: continue home meds, monitor bp     Pain sec to facial bone trauma, pain controlled with nsaid    Repeat imaging as indicated.       Following with neuro    35 minutes total care time, >1/2 in unit/floor time and care coordination

## 2018-08-15 NOTE — CONSULTS
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

## 2018-08-15 NOTE — CONSULTS
08/15/18   0037  08/15/18   0738   WBC  13.4*   --   9.6   HGB  14.8  15.1  13.3*   HCT  42.5  45.3  38.7*   MCV  88.8   --   89.3   PLT  240   --   286     Recent Labs      08/14/18   1645  08/14/18   1714   NA  130*   --    K  4.5   --    CL  91*   --    CO2  23   --    BUN  14   --    CREATININE  0.88  1.0     Recent Labs      08/14/18   1645   AST  24   ALT  22   BILITOT  0.5   ALKPHOS  53     No results for input(s): LIPASE, AMYLASE in the last 72 hours. Recent Labs      08/14/18   1645   PROT  7.1   INR  1.0     Ct Head Wo Contrast    Result Date: 8/14/2018  EXAMINATION: CT of the brain without contrast HISTORY: Fall. COMPARISON: TECHNIQUE: Multiple contiguous axial images were obtained of the brain from the skull base through the vertex. Multiplanar reformats were obtained. FINDINGS: Prominence of the sulci and ventricles is consistent with mild generalized parenchymal volume loss. Periventricular hypoattenuation is most consistent with chronic ischemic microangiopathy and patient this age. Remote left basal ganglia lacunar infarct. Gray-white matter differentiation is preserved. Acute subdural hematoma is present along the left tentorium and falx, measuring up to 9 mm in greatest thickness along the tentorium and 3 mm in thickness along the falx. No midline shift at this time. Basal cisterns are patent. No mass effect or midline  shift. The visualized paranasal sinuses and mastoid air cells are clear. Calvarium is intact. Small right frontal subcutaneous hematoma. 1. Acute subdural hematoma is present along the left tentorium and falx, measuring up to 9 mm in greatest thickness along the tentorium and 3 mm in greatest thickness along the falx to the left. No midline shift at this time. Findings discussed with Dr. Caceres Shelter by Dr. Lender Libman via telephone at approximately 2015 hours on 8/14/2018. 2. Small right frontal scalp hematoma.  All CT scans at this facility use dose modulation, iterative reconstruction, or mass. No consolidation, pleural effusion, or pneumothorax. Minimal bilateral dependent atelectasis. Airways are patent. No bronchiectasis. Visualized upper abdominal viscera appear within normal limits. Mild multilevel degenerative changes of the thoracic spine. No acute osseous abnormality. ABDOMEN/PELVIS: The liver, gallbladder, spleen, stomach, pancreas, and adrenal glands are within normal limits. The kidneys enhance uniformly. No enhancing renal lesion identified. A small area of cortical irregularity with 5 mm calcification is present of the anterior cortex of the inferior pole of the left kidney, likely due to remote insult. No urinary tract calculi or hydronephrosis. Urinary bladder is well distended. A 9 mm posterior lateral bladder diverticulum is noted adjacent to the left ureterovesicular junction. The prostate is enlarged and demonstrates a few internal calcifications. Abdominal aorta is nonaneurysmal and demonstrates scattered mild atherosclerotic calcification. No retroperitoneal or abdominal/pelvic lymphadenopathy. No small bowel obstruction. Colonic diverticulosis without diverticulitis. No overt colonic mass or pericolonic inflammation. Appendix is not definitively visualized. No free fluid or free air. Mild degenerative changes of the lumbar spine. No acute osseous abnormality of the abdomen and pelvis. CHEST: 1. Mild subcutaneous soft tissue edema along the anterior left chest wall. 2. No acute intrathoracic process seen on this noncontrast CT of the thorax. ABDOMEN/PELVIS: 1. No acute intra-abdominal process. 2. Colonic diverticulosis without diverticulitis. 3. Enlargement of the prostate. Additional incidental findings as above. All CT scans at this facility use dose modulation, iterative reconstruction, and/or weight based dosing when appropriate to reduce radiation dose to as low as reasonably achievable. Impression:   Status post fall with traumatic subdural hematoma. Clinically remained stable. Plan:   Patient was already given platelets which I agree and continue with blood pressure control. Patient is okay to transfer to regular floor from neurosurgical standpoint. regular diet activities instructions were given PT OT consult. Comments: Thank you for allowing us to participate in the care of this patient. Will continue to follow. Please call if questions or concerns arise.     Electronically signed by Oxana Harris MD on 8/15/2018 at 7:54 AM

## 2018-08-16 LAB
HCT VFR BLD CALC: 36.1 % (ref 42–52)
HCT VFR BLD CALC: 38.2 % (ref 42–52)
HCT VFR BLD CALC: 38.8 % (ref 42–52)
HEMOGLOBIN: 12.3 G/DL (ref 14–18)
HEMOGLOBIN: 13.1 G/DL (ref 14–18)
HEMOGLOBIN: 13.2 G/DL (ref 14–18)

## 2018-08-16 PROCEDURE — 97116 GAIT TRAINING THERAPY: CPT

## 2018-08-16 PROCEDURE — G8987 SELF CARE CURRENT STATUS: HCPCS

## 2018-08-16 PROCEDURE — 6360000002 HC RX W HCPCS: Performed by: INTERNAL MEDICINE

## 2018-08-16 PROCEDURE — 1210000000 HC MED SURG R&B

## 2018-08-16 PROCEDURE — G8988 SELF CARE GOAL STATUS: HCPCS

## 2018-08-16 PROCEDURE — 36415 COLL VENOUS BLD VENIPUNCTURE: CPT

## 2018-08-16 PROCEDURE — 85014 HEMATOCRIT: CPT

## 2018-08-16 PROCEDURE — 6370000000 HC RX 637 (ALT 250 FOR IP): Performed by: INTERNAL MEDICINE

## 2018-08-16 PROCEDURE — 85018 HEMOGLOBIN: CPT

## 2018-08-16 PROCEDURE — 97166 OT EVAL MOD COMPLEX 45 MIN: CPT

## 2018-08-16 PROCEDURE — 6360000002 HC RX W HCPCS: Performed by: EMERGENCY MEDICINE

## 2018-08-16 RX ORDER — HYDRALAZINE HYDROCHLORIDE 20 MG/ML
10 INJECTION INTRAMUSCULAR; INTRAVENOUS
Status: CANCELLED | OUTPATIENT
Start: 2018-08-16

## 2018-08-16 RX ORDER — SIMVASTATIN 20 MG
20 TABLET ORAL NIGHTLY
Status: CANCELLED | OUTPATIENT
Start: 2018-08-16

## 2018-08-16 RX ORDER — LABETALOL HYDROCHLORIDE 5 MG/ML
10 INJECTION, SOLUTION INTRAVENOUS
Status: CANCELLED | OUTPATIENT
Start: 2018-08-16

## 2018-08-16 RX ORDER — ONDANSETRON 2 MG/ML
4 INJECTION INTRAMUSCULAR; INTRAVENOUS EVERY 6 HOURS PRN
Status: CANCELLED | OUTPATIENT
Start: 2018-08-16

## 2018-08-16 RX ORDER — METOCLOPRAMIDE HYDROCHLORIDE 5 MG/ML
10 INJECTION INTRAMUSCULAR; INTRAVENOUS ONCE
Status: COMPLETED | OUTPATIENT
Start: 2018-08-16 | End: 2018-08-16

## 2018-08-16 RX ORDER — ACETAMINOPHEN 325 MG/1
650 TABLET ORAL EVERY 4 HOURS PRN
Status: CANCELLED | OUTPATIENT
Start: 2018-08-16

## 2018-08-16 RX ORDER — ONDANSETRON 2 MG/ML
4 INJECTION INTRAMUSCULAR; INTRAVENOUS EVERY 6 HOURS PRN
Status: DISCONTINUED | OUTPATIENT
Start: 2018-08-16 | End: 2018-08-17 | Stop reason: HOSPADM

## 2018-08-16 RX ORDER — TRAMADOL HYDROCHLORIDE 50 MG/1
50 TABLET ORAL EVERY 6 HOURS PRN
Status: CANCELLED | OUTPATIENT
Start: 2018-08-16

## 2018-08-16 RX ORDER — LORAZEPAM 2 MG/ML
0.5 INJECTION INTRAMUSCULAR ONCE
Status: COMPLETED | OUTPATIENT
Start: 2018-08-16 | End: 2018-08-16

## 2018-08-16 RX ADMIN — ONDANSETRON HYDROCHLORIDE 4 MG: 2 INJECTION, SOLUTION INTRAMUSCULAR; INTRAVENOUS at 06:47

## 2018-08-16 RX ADMIN — MAGNESIUM HYDROXIDE 30 ML: 400 SUSPENSION ORAL at 14:52

## 2018-08-16 RX ADMIN — LORAZEPAM 0.5 MG: 2 INJECTION INTRAMUSCULAR; INTRAVENOUS at 02:04

## 2018-08-16 RX ADMIN — TRAMADOL HYDROCHLORIDE 50 MG: 50 TABLET, FILM COATED ORAL at 20:33

## 2018-08-16 RX ADMIN — ACETAMINOPHEN 650 MG: 325 TABLET ORAL at 23:40

## 2018-08-16 RX ADMIN — HYDRALAZINE HYDROCHLORIDE 10 MG: 20 INJECTION INTRAMUSCULAR; INTRAVENOUS at 03:43

## 2018-08-16 RX ADMIN — METOCLOPRAMIDE 10 MG: 5 INJECTION, SOLUTION INTRAMUSCULAR; INTRAVENOUS at 10:31

## 2018-08-16 RX ADMIN — MAGNESIUM HYDROXIDE 30 ML: 400 SUSPENSION ORAL at 10:44

## 2018-08-16 RX ADMIN — SIMVASTATIN 20 MG: 20 TABLET, FILM COATED ORAL at 20:33

## 2018-08-16 ASSESSMENT — PAIN SCALES - GENERAL
PAINLEVEL_OUTOF10: 0
PAINLEVEL_OUTOF10: 6
PAINLEVEL_OUTOF10: 0
PAINLEVEL_OUTOF10: 4
PAINLEVEL_OUTOF10: 0
PAINLEVEL_OUTOF10: 10
PAINLEVEL_OUTOF10: 0
PAINLEVEL_OUTOF10: 0

## 2018-08-16 ASSESSMENT — PAIN DESCRIPTION - PAIN TYPE: TYPE: ACUTE PAIN

## 2018-08-16 ASSESSMENT — PAIN DESCRIPTION - DESCRIPTORS: DESCRIPTORS: ACHING

## 2018-08-16 ASSESSMENT — PAIN DESCRIPTION - LOCATION: LOCATION: HEAD

## 2018-08-16 NOTE — PROGRESS NOTES
premaxillary and perinasal soft tissue swelling. ACUTE SUBDURAL HEMATOMA PRESENT ALONG THE LEFT TENTORIUM AND FALX. UNCHANGED. OTHER FINDINGS DETAILED ABOVE All CT scans at this facility use dose modulation, iterative reconstruction, and/or weight based dosing when appropriate to reduce radiation dose to as low as reasonably achievable. Ct Head Wo Contrast    Result Date: 8/14/2018  EXAMINATION: CT of the brain without contrast HISTORY: Fall. COMPARISON: TECHNIQUE: Multiple contiguous axial images were obtained of the brain from the skull base through the vertex. Multiplanar reformats were obtained. FINDINGS: Prominence of the sulci and ventricles is consistent with mild generalized parenchymal volume loss. Periventricular hypoattenuation is most consistent with chronic ischemic microangiopathy and patient this age. Remote left basal ganglia lacunar infarct. Gray-white matter differentiation is preserved. Acute subdural hematoma is present along the left tentorium and falx, measuring up to 9 mm in greatest thickness along the tentorium and 3 mm in thickness along the falx. No midline shift at this time. Basal cisterns are patent. No mass effect or midline  shift. The visualized paranasal sinuses and mastoid air cells are clear. Calvarium is intact. Small right frontal subcutaneous hematoma. 1. Acute subdural hematoma is present along the left tentorium and falx, measuring up to 9 mm in greatest thickness along the tentorium and 3 mm in greatest thickness along the falx to the left. No midline shift at this time. Findings discussed with Dr. Morris Robles by Dr. Lender Libman via telephone at approximately 2015 hours on 8/14/2018. 2. Small right frontal scalp hematoma. All CT scans at this facility use dose modulation, iterative reconstruction, and/or weight based dosing when appropriate to reduce radiation dose to as low as reasonably achievable.     Ct Facial Bones Wo Contrast    Result Date: 8/15/2018  UNENHANCED CT irregularity with 5 mm calcification is present of the anterior cortex of the inferior pole of the left kidney, likely due to remote insult. No urinary tract calculi or hydronephrosis. Urinary bladder is well distended. A 9 mm posterior lateral bladder diverticulum is noted adjacent to the left ureterovesicular junction. The prostate is enlarged and demonstrates a few internal calcifications. Abdominal aorta is nonaneurysmal and demonstrates scattered mild atherosclerotic calcification. No retroperitoneal or abdominal/pelvic lymphadenopathy. No small bowel obstruction. Colonic diverticulosis without diverticulitis. No overt colonic mass or pericolonic inflammation. Appendix is not definitively visualized. No free fluid or free air. Mild degenerative changes of the lumbar spine. No acute osseous abnormality of the abdomen and pelvis. CHEST: 1. Mild subcutaneous soft tissue edema along the anterior left chest wall. 2. No acute intrathoracic process seen on this noncontrast CT of the thorax. ABDOMEN/PELVIS: 1. No acute intra-abdominal process. 2. Colonic diverticulosis without diverticulitis. 3. Enlargement of the prostate. Additional incidental findings as above. All CT scans at this facility use dose modulation, iterative reconstruction, and/or weight based dosing when appropriate to reduce radiation dose to as low as reasonably achievable. Us Carotid Artery Bilateral    Result Date: 8/16/2018  US CAROTID ARTERY BILATERAL: 8/16/2018 CLINICAL HISTORY:  STROKE . Fall, left subdural hematoma, and history of CVA COMPARISON: 8/12/2017. Grayscale, color and waveform Doppler analysis of the cervical carotid and vertebral arteries was performed. Validated velocity measurements with angiographic measurements, velocity criteria are extrapolated from diameter data as defined by the Society of Radiologist in [address] [place] [address] Radiology 2003; 780;696-806.  FINDINGS: There is mild atherosclerotic plaquing of

## 2018-08-16 NOTE — FLOWSHEET NOTE
1800: Patient worked with physical therapy. Patient reports weakness and nauseated. Refuses to go to Rehab today, states that he will go tomorrow, once he feel better. Per Alba Pierre transfer patient over to Rehab tomorrow. [phone] Talked to  and plan is to go over to Rehab tomorrow. Dr Ciara Moon notified.

## 2018-08-16 NOTE — PROGRESS NOTES
Vision:   [x]  [facility]   [] Impaired  Comments:    Reading glasses  Hearing:  [x] WFL   [] Impaired  Comments:    Sensation:   [x] WFL   []  Impaired   Comments:      Cognition:   [x] WFL   [] Impaired  Comments:    Communication:   [x] WFL   [] Impaired  Comments:    Hand Dominance: [x] Right   [] Left    Range of Motion:  R UE AROM/PROM: [x]  WFL [] Impaired  Comments:   L UE AROM/PROM:  [x]  WFL [] Impaired  Comments:     Strength:   R UE Strength: []1    [] 2   [] 2+   [] 3   [] 3+   [x] 4-   [] 4+  [] 5  Comments:   L UE Strength:  []1    [] 2   [] 2+   [] 3   [] 3+  [x] 4-   [] 4+   [] 5  Comments:     Quality of Movement:  [x] Good   [] Fair   [] Poor     Coordination:  Gross motor: [x] WFL   [] Impaired   Fine motor: [x] WFL   [] Impaired     Functional Mobility:  Toilet Transfers:  SBA  Bed Transfer:  Supervision  Sit to stand: CGA  Bed to Chair:  CGA    Seated Balance:      Static: [x] Good  [] Fair   [] Poor   Dynamic: []  Good  [x] Fair+   [] Poor     Standing Balance:     Static: [x] Good-   [] Fair  [] Poor   Dynamic: [] Good   [x] Fair   [] Poor     Functional Endurance: [] Good  [x] Fair-  [] Poor     ADLs  Feeding:   Positive hand to mouth  UE Dressing:  SBA  LB Dressing:  Min A  Bathing:  Min A  Toileting: SBA  Grooming: Set up    Treatment Plan will consist of:   [x] ADL Training   [x] Strengthening   [x] Endurance   [x] Transfer Training   [x]  DME ed      [x] HEP  [] Manual Therapy   [] AROM/PROM    [] Coordination   [] Cognitive Training   []Safety training   [] Other :    Goals:   Patient will:   [x]  Improve functional endurance to tolerate/complete 15-25 mins of ADL's   [x]  Be independent in UB ADLs    [x]  Be independent in LB ADLs   [x]  Be independent in ADL transfers without LOB   [x]  Be independent in toileting tasks   []  Improve   hand fine motor coordination to   in order to manage clothing fasteners/self-care containers in a timely manner   []  Improve   UE Function (AROM, strength,

## 2018-08-16 NOTE — PROGRESS NOTES
right nasal bones. There is some deviation nasal septum to the right. FRACTURES OF THE LEFT AND RIGHT NASAL BONES WITH ASSOCIATED OVERLYING PERINASAL SOFT TISSUE SWELLING. OTHER FINDINGS AS DETAILED ABOVE and. All CT scans at this facility use dose modulation, iterative reconstruction, and/or weight based dosing when appropriate to reduce radiation dose to as low as reasonably achievable. All    Ct Chest Wo Contrast    Result Date: 8/14/2018  EXAM: CT of the chest without contrast CT of the abdomen and pelvis with contrast History: Trauma. Fall. Technique: Multiple contiguous axial images were obtained of the thorax from the thoracic inlet through the upper abdomen without intravenous contrast. Multiplanar reformats were obtained. Multiple contiguous axial images of the abdomen and pelvis were obtained from the lung bases through the initial tuberosities after intravenous ministration of contrast Comparison: None available Findings: CHEST: Subcutaneous soft tissue edema is present along the left anterior chest wall. No axillary, mediastinal, or hilar lymphadenopathy. 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 No thoracic aortic aneurysm . Atherosclerotic calcification of the aortic arch. Heart size is within normal limits. No significant pericardial effusion. Coronary artery calcifications are noted. Esophagus is within normal limits. No pulmonary nodule or mass. No consolidation, pleural effusion, or pneumothorax. Minimal bilateral dependent atelectasis. Airways are patent. No bronchiectasis. Mild multilevel degenerative changes of the thoracic spine. No acute osseous abnormality. ABDOMEN/PELVIS: The liver, gallbladder, spleen, stomach, pancreas, and adrenal glands are within normal limits. The kidneys enhance uniformly. No enhancing renal lesion identified.  A small area of cortical irregularity with 5 mm calcification is present of the anterior cortex of the inferior pole of the left kidney, likely due to remote

## 2018-08-16 NOTE — PROGRESS NOTES
Physical Therapy Med Surg Daily Treatment Note  Facility/Department: [facility]  [facility]       NAME: Smita Beltran Day  : 1940 (69 y.o.)  MRN: [id number]  CODE STATUS: No Order    Date of Service: 2018    Patient Diagnosis(es): Post-traumatic subdural hematoma, without loss of consciousness, subsequent encounter [S06.5X0D]   Chief Complaint   Patient presents with    Fall     Tripped in the driveway around 0087 today. On ASA and Plavix     Patient Active Problem List    Diagnosis Date Noted    Post-traumatic subdural hematoma ([facility] 2018    Acute CVA (cerebrovascular accident) ([facility] 2015    Impaired mobility and activities of daily living 2015    Type II diabetes mellitus, uncontrolled ([facility] 2012    Hypothyroidism 2012    Hypertension 2012    Hyperlipidemia 2012        Past Medical History:   Diagnosis Date    Hyperlipidemia     Hypothyroidism     Type II or unspecified type diabetes mellitus without mention of complication, not stated as uncontrolled      History reviewed. No pertinent surgical history. Restrictions:  Restrictions/Precautions: Fall Risk    SUBJECTIVE:  Subjective  Subjective: I feel pretty good except for being nauseated. Pre Pain Assessment:  Pre Treatment Pain Screening  Pain at present: 4  Intervention List: Patient able to continue with treatment          Post Pain Assessment:   Pain Assessment  Pain Level: 4  Pain Type: Acute pain  Pain Location: Head  Pain Descriptors: Aching       OBJECTIVE:         Bed mobility  Supine to Sit: Supervision  Sit to Supine: Supervision  Comment: HOB slightly elevated; vc's for technique    Transfers  Sit to Stand: Stand by assistance  Stand to sit: Stand by assistance  Comment: vc's for hand placement; pt wanted to pull up from [employer].      Ambulation 1  Surface: level tile  Device: Rolling Walker  Assistance: Stand by assistance  Quality of Gait: Reciprocal, slow braulio, no LOB  Distance: 22'  Comments: Steady      Ambulation 2  Surface - 2: level tile  Device 2: No device  Assistance 2: Stand by assistance  Quality of Gait 2: NBOS, reciprocal, steady, no LOB  Distance: 25'    Neuromuscular Education  Neuromuscular Comments: standing balance activity - reaching 2-4\" OOBOS with confidence; trunk rotations, marching                           ASSESSMENT:  Body structures, Functions, Activity limitations: Decreased functional mobility ; Decreased strength;Decreased endurance;Decreased balance;Decreased ROM    Assessment: Good participation, Pt limited by nausea    Activity Tolerance  Activity Tolerance: Patient Tolerated treatment well       Discharge Recommendations:  Continue to assess pending progress, Patient would benefit from continued therapy after discharge    Goals:  Short term goals  Short term goal 1: indep with HEP  Short term goal 2: standing balance activities with SBA and <1 LOB  Long term goals  Long term goal 1: indep with bed mobility   Long term goal 2: functional transfers with indep   Long term goal 3: amb >150ft with LRAD and mod I  Long term goal 4: 12 steps with handrail and Mod I  Patient Goals   Patient goals : \"go home, improve balance, legs stronger\"    PLAN:   Plan  Times per week: 3-6  Times per day: Daily  Current Treatment Recommendations: Strengthening, Functional Mobility Training, Neuromuscular Re-education, Home Exercise Program, Equipment Evaluation, Education, & procurement, Modalities, Safety Education & Training, Gait Training, Transfer Training, Balance Training, Endurance Training, Stair training, Patient/Caregiver Education & Training, Positioning  Safety Devices  Type of devices:  All fall risk precautions in place, Call light within reach, Chair alarm in place, Left in chair     [facility] (6 CLICK) [address] Mobility Raw Score : 18      Therapy Time   Individual   Time In 1030   Time Out 1043   Minutes 13      gait - 10

## 2018-08-16 NOTE — PROGRESS NOTES
No new complaints. No nausea, vomiting, chest pain, or headache     BP (!) 134/48   Pulse 69   Temp 98 °F (36.7 °C) (Axillary)   Resp 15   Ht 6' (1.829 m)   Wt 240 lb (108.9 kg)   SpO2 96%   BMI 32.55 kg/m²     Physical Exam   Constitutional: He is oriented to person, place, and time and well-developed, well-nourished, and in no distress. Eyes: Conjunctivae and EOM are normal.   Neck: Normal range of motion. Neck supple. Neurological: He is alert and oriented to person, place, and time. Skin: Skin is warm and dry. S/p mech fall with subdural hematoma: neurosurg and neurology following. Htn: continue home meds, monitor bp     Pain sec to facial bone trauma, pain controlled with nsaid    Imaging results reviewed. Clinically doing well. No medical barriers to discharge.       25 minutes total care time, >1/2 in unit/floor time and care coordination

## 2018-08-17 ENCOUNTER — HOSPITAL ENCOUNTER (INPATIENT)
Age: 78
LOS: 6 days | Discharge: HOME | DRG: 950 | End: 2018-08-23
Attending: PHYSICAL MEDICINE & REHABILITATION | Admitting: PHYSICAL MEDICINE & REHABILITATION
Payer: MEDICARE

## 2018-08-17 VITALS
WEIGHT: 240 LBS | DIASTOLIC BLOOD PRESSURE: 72 MMHG | HEIGHT: 72 IN | OXYGEN SATURATION: 96 % | SYSTOLIC BLOOD PRESSURE: 150 MMHG | RESPIRATION RATE: 18 BRPM | TEMPERATURE: 98.2 F | HEART RATE: 59 BPM | BODY MASS INDEX: 32.51 KG/M2

## 2018-08-17 DIAGNOSIS — Z74.09 IMPAIRED MOBILITY AND ACTIVITIES OF DAILY LIVING: ICD-10-CM

## 2018-08-17 DIAGNOSIS — R26.9 ABNORMALITY OF GAIT AND MOBILITY: ICD-10-CM

## 2018-08-17 DIAGNOSIS — E11.65 TYPE 2 DIABETES MELLITUS WITH HYPERGLYCEMIA, WITH LONG-TERM CURRENT USE OF INSULIN (HCC): Primary | ICD-10-CM

## 2018-08-17 DIAGNOSIS — I69.90 LATE EFFECTS OF CVA (CEREBROVASCULAR ACCIDENT): ICD-10-CM

## 2018-08-17 DIAGNOSIS — Z79.4 TYPE 2 DIABETES MELLITUS WITH HYPERGLYCEMIA, WITH LONG-TERM CURRENT USE OF INSULIN (HCC): Primary | ICD-10-CM

## 2018-08-17 DIAGNOSIS — Z78.9 IMPAIRED MOBILITY AND ACTIVITIES OF DAILY LIVING: ICD-10-CM

## 2018-08-17 LAB
BILIRUBIN URINE: NEGATIVE
BLOOD, URINE: NEGATIVE
CLARITY: CLEAR
COLOR: YELLOW
GLUCOSE URINE: 500 MG/DL
HCT VFR BLD CALC: 37.1 % (ref 42–52)
HCT VFR BLD CALC: 38.8 % (ref 42–52)
HCT VFR BLD CALC: 39.6 % (ref 42–52)
HEMOGLOBIN: 12.9 G/DL (ref 14–18)
HEMOGLOBIN: 13 G/DL (ref 14–18)
HEMOGLOBIN: 13.8 G/DL (ref 14–18)
KETONES, URINE: ABNORMAL MG/DL
LEUKOCYTE ESTERASE, URINE: NEGATIVE
NITRITE, URINE: NEGATIVE
PH UA: 6 (ref 5–9)
PROTEIN UA: NEGATIVE MG/DL
SPECIFIC GRAVITY UA: 1.03 (ref 1–1.03)
UROBILINOGEN, URINE: 0.2 E.U./DL

## 2018-08-17 PROCEDURE — 2500000003 HC RX 250 WO HCPCS: Performed by: NEUROLOGICAL SURGERY

## 2018-08-17 PROCEDURE — 85018 HEMOGLOBIN: CPT

## 2018-08-17 PROCEDURE — 6370000000 HC RX 637 (ALT 250 FOR IP): Performed by: INTERNAL MEDICINE

## 2018-08-17 PROCEDURE — 36415 COLL VENOUS BLD VENIPUNCTURE: CPT

## 2018-08-17 PROCEDURE — 6360000002 HC RX W HCPCS: Performed by: INTERNAL MEDICINE

## 2018-08-17 PROCEDURE — 81003 URINALYSIS AUTO W/O SCOPE: CPT

## 2018-08-17 PROCEDURE — 6360000002 HC RX W HCPCS: Performed by: EMERGENCY MEDICINE

## 2018-08-17 PROCEDURE — 85014 HEMATOCRIT: CPT

## 2018-08-17 PROCEDURE — 87086 URINE CULTURE/COLONY COUNT: CPT

## 2018-08-17 PROCEDURE — 1180000000 HC REHAB R&B

## 2018-08-17 RX ORDER — ONDANSETRON 2 MG/ML
4 INJECTION INTRAMUSCULAR; INTRAVENOUS EVERY 6 HOURS PRN
Status: DISCONTINUED | OUTPATIENT
Start: 2018-08-17 | End: 2018-08-23 | Stop reason: HOSPADM

## 2018-08-17 RX ORDER — LANOLIN ALCOHOL/MO/W.PET/CERES
3 CREAM (GRAM) TOPICAL NIGHTLY PRN
Status: DISCONTINUED | OUTPATIENT
Start: 2018-08-17 | End: 2018-08-23 | Stop reason: HOSPADM

## 2018-08-17 RX ORDER — CALCIUM CARBONATE 200(500)MG
500 TABLET,CHEWABLE ORAL 3 TIMES DAILY PRN
Status: DISCONTINUED | OUTPATIENT
Start: 2018-08-17 | End: 2018-08-23 | Stop reason: HOSPADM

## 2018-08-17 RX ORDER — TRAMADOL HYDROCHLORIDE 50 MG/1
50 TABLET ORAL EVERY 6 HOURS PRN
Status: DISCONTINUED | OUTPATIENT
Start: 2018-08-17 | End: 2018-08-23 | Stop reason: HOSPADM

## 2018-08-17 RX ORDER — LEVOTHYROXINE SODIUM 137 UG/1
137 TABLET ORAL DAILY
Status: DISCONTINUED | OUTPATIENT
Start: 2018-08-17 | End: 2018-08-23 | Stop reason: HOSPADM

## 2018-08-17 RX ORDER — ONDANSETRON 4 MG/1
4 TABLET, FILM COATED ORAL EVERY 6 HOURS PRN
Status: DISCONTINUED | OUTPATIENT
Start: 2018-08-17 | End: 2018-08-23 | Stop reason: HOSPADM

## 2018-08-17 RX ORDER — ACETAMINOPHEN 325 MG/1
650 TABLET ORAL EVERY 4 HOURS PRN
Status: DISCONTINUED | OUTPATIENT
Start: 2018-08-17 | End: 2018-08-23 | Stop reason: HOSPADM

## 2018-08-17 RX ORDER — SIMVASTATIN 20 MG
20 TABLET ORAL NIGHTLY
Status: DISCONTINUED | OUTPATIENT
Start: 2018-08-17 | End: 2018-08-23 | Stop reason: HOSPADM

## 2018-08-17 RX ORDER — LACTULOSE 10 G/15ML
20 SOLUTION ORAL
Status: COMPLETED | OUTPATIENT
Start: 2018-08-17 | End: 2018-08-17

## 2018-08-17 RX ORDER — LANOLIN ALCOHOL/MO/W.PET/CERES
3 CREAM (GRAM) TOPICAL NIGHTLY PRN
Status: DISCONTINUED | OUTPATIENT
Start: 2018-08-18 | End: 2018-08-17

## 2018-08-17 RX ORDER — CARVEDILOL 6.25 MG/1
6.25 TABLET ORAL 2 TIMES DAILY WITH MEALS
Status: DISCONTINUED | OUTPATIENT
Start: 2018-08-17 | End: 2018-08-17 | Stop reason: HOSPADM

## 2018-08-17 RX ORDER — HYDRALAZINE HYDROCHLORIDE 20 MG/ML
10 INJECTION INTRAMUSCULAR; INTRAVENOUS
Status: DISCONTINUED | OUTPATIENT
Start: 2018-08-17 | End: 2018-08-19

## 2018-08-17 RX ADMIN — TRAMADOL HYDROCHLORIDE 50 MG: 50 TABLET, FILM COATED ORAL at 13:01

## 2018-08-17 RX ADMIN — ANTACID TABLETS 500 MG: 500 TABLET, CHEWABLE ORAL at 21:17

## 2018-08-17 RX ADMIN — LACTULOSE 20 G: 20 SOLUTION ORAL at 14:18

## 2018-08-17 RX ADMIN — HYDRALAZINE HYDROCHLORIDE 10 MG: 20 INJECTION INTRAMUSCULAR; INTRAVENOUS at 09:39

## 2018-08-17 RX ADMIN — LEVOTHYROXINE SODIUM 137 MCG: 137 TABLET ORAL at 05:41

## 2018-08-17 RX ADMIN — LABETALOL HYDROCHLORIDE 10 MG: 5 INJECTION INTRAVENOUS at 07:04

## 2018-08-17 RX ADMIN — CARVEDILOL 6.25 MG: 6.25 TABLET, FILM COATED ORAL at 17:03

## 2018-08-17 RX ADMIN — LACTULOSE 20 G: 20 SOLUTION ORAL at 11:02

## 2018-08-17 RX ADMIN — MELATONIN TAB 3 MG 3 MG: 3 TAB at 21:41

## 2018-08-17 RX ADMIN — SIMVASTATIN 20 MG: 20 TABLET, FILM COATED ORAL at 20:28

## 2018-08-17 RX ADMIN — LABETALOL HYDROCHLORIDE 10 MG: 5 INJECTION INTRAVENOUS at 05:42

## 2018-08-17 RX ADMIN — HYDRALAZINE HYDROCHLORIDE 10 MG: 20 INJECTION INTRAMUSCULAR; INTRAVENOUS at 08:04

## 2018-08-17 RX ADMIN — HYDRALAZINE HYDROCHLORIDE 10 MG: 20 INJECTION INTRAMUSCULAR; INTRAVENOUS at 04:40

## 2018-08-17 RX ADMIN — CARVEDILOL 6.25 MG: 6.25 TABLET, FILM COATED ORAL at 11:02

## 2018-08-17 RX ADMIN — ONDANSETRON HYDROCHLORIDE 4 MG: 2 INJECTION, SOLUTION INTRAMUSCULAR; INTRAVENOUS at 17:03

## 2018-08-17 RX ADMIN — ONDANSETRON HYDROCHLORIDE 4 MG: 2 INJECTION, SOLUTION INTRAMUSCULAR; INTRAVENOUS at 08:04

## 2018-08-17 RX ADMIN — TRAMADOL HYDROCHLORIDE 50 MG: 50 TABLET, FILM COATED ORAL at 04:48

## 2018-08-17 RX ADMIN — ACETAMINOPHEN 650 MG: 325 TABLET ORAL at 17:03

## 2018-08-17 RX ADMIN — ONDANSETRON HYDROCHLORIDE 4 MG: 4 TABLET, FILM COATED ORAL at 21:40

## 2018-08-17 ASSESSMENT — PAIN DESCRIPTION - FREQUENCY: FREQUENCY: CONTINUOUS

## 2018-08-17 ASSESSMENT — PAIN DESCRIPTION - PROGRESSION: CLINICAL_PROGRESSION: NOT CHANGED

## 2018-08-17 ASSESSMENT — PAIN SCALES - GENERAL
PAINLEVEL_OUTOF10: 6
PAINLEVEL_OUTOF10: 0
PAINLEVEL_OUTOF10: 4
PAINLEVEL_OUTOF10: 3

## 2018-08-17 ASSESSMENT — PAIN DESCRIPTION - LOCATION: LOCATION: HEAD;NOSE

## 2018-08-17 ASSESSMENT — PAIN DESCRIPTION - DESCRIPTORS: DESCRIPTORS: ACHING

## 2018-08-17 ASSESSMENT — PAIN DESCRIPTION - ONSET: ONSET: ON-GOING

## 2018-08-17 ASSESSMENT — PAIN DESCRIPTION - PAIN TYPE: TYPE: ACUTE PAIN

## 2018-08-17 NOTE — PROGRESS NOTES
thickness along the falx. No midline shift at this time. Basal cisterns are patent. No mass effect or midline  shift. The visualized paranasal sinuses and mastoid air cells are clear. Calvarium is intact. Small right frontal subcutaneous hematoma. 1. Acute subdural hematoma is present along the left tentorium and falx, measuring up to 9 mm in greatest thickness along the tentorium and 3 mm in greatest thickness along the falx to the left. No midline shift at this time. Findings discussed with Dr. Lizz Jensen by Dr. Wayne Olivo via telephone at approximately 2015 hours on 8/14/2018. 2. Small right frontal scalp hematoma. All CT scans at this facility use dose modulation, iterative reconstruction, and/or weight based dosing when appropriate to reduce radiation dose to as low as reasonably achievable. Ct Facial Bones Wo Contrast    Result Date: 8/15/2018  UNENHANCED CT SCAN OF THE MAXILLOFACIAL REGION. CLINICAL HISTORY: Right-sided facial pain after fall COMPARISON: None TECHNIQUE: Multiple unenhanced serial axial images of the of the maxillofacial region with both sagittal and coronal reconstructions was performed. FINDINGS: Within the field-of-view the visualized basal brain shows again the acute subdural hematoma present along the left tentorium and falx. Please see CT scan report of the brain August 14, 2018 1937 hours for additional details. Prevertebral soft tissue is unremarkable. Within the field-of-view supraglottic and infraglottic airway are patent. The parotids, submandibular glands, are unremarkable. The visualized paranasal sinuses, frontal, anterior. As ethmoid, sphenoid and maxillary sinuses are well aerated. No significant air-fluid levels, retention cyst and/or polyps. The mastoids are well aerated. . Both globes are intact. No gross post septal findings. There is diffuse right preseptal, infraorbital, supraorbital, right frontal, premaxillary soft tissue swelling and hematoma.  There is perinasal soft tissue swelling. There is fractures of both the left and right nasal bones. There is some deviation nasal septum to the right. FRACTURES OF THE LEFT AND RIGHT NASAL BONES WITH ASSOCIATED OVERLYING PERINASAL SOFT TISSUE SWELLING. OTHER FINDINGS AS DETAILED ABOVE and. All CT scans at this facility use dose modulation, iterative reconstruction, and/or weight based dosing when appropriate to reduce radiation dose to as low as reasonably achievable. All    Ct Chest Wo Contrast    Result Date: 8/14/2018  EXAM: CT of the chest without contrast CT of the abdomen and pelvis with contrast History: Trauma. Fall. Technique: Multiple contiguous axial images were obtained of the thorax from the thoracic inlet through the upper abdomen without intravenous contrast. Multiplanar reformats were obtained. Multiple contiguous axial images of the abdomen and pelvis were obtained from the lung bases through the initial tuberosities after intravenous ministration of contrast Comparison: None available Findings: CHEST: Subcutaneous soft tissue edema is present along the left anterior chest wall. No axillary, mediastinal, or hilar lymphadenopathy. 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 No thoracic aortic aneurysm . Atherosclerotic calcification of the aortic arch. Heart size is within normal limits. No significant pericardial effusion. Coronary artery calcifications are noted. Esophagus is within normal limits. No pulmonary nodule or mass. No consolidation, pleural effusion, or pneumothorax. Minimal bilateral dependent atelectasis. Airways are patent. No bronchiectasis. Mild multilevel degenerative changes of the thoracic spine. No acute osseous abnormality. ABDOMEN/PELVIS: The liver, gallbladder, spleen, stomach, pancreas, and adrenal glands are within normal limits. The kidneys enhance uniformly. No enhancing renal lesion identified.  A small area of cortical irregularity with 5 mm calcification is present of the anterior cortex of the inferior pole of the left kidney, likely due to remote insult. No urinary tract calculi or hydronephrosis. Urinary bladder is well distended. A 9 mm posterior lateral bladder diverticulum is noted adjacent to the left ureterovesicular junction. The prostate is enlarged and demonstrates a few internal calcifications. Abdominal aorta is nonaneurysmal and demonstrates scattered mild atherosclerotic calcification. No retroperitoneal or abdominal/pelvic lymphadenopathy. No small bowel obstruction. Colonic diverticulosis without diverticulitis. No overt colonic mass or pericolonic inflammation. Appendix is not definitively visualized. No free fluid or free air. Mild degenerative changes of the lumbar spine. No acute osseous abnormality of the abdomen and pelvis. CHEST: 1. Mild subcutaneous soft tissue edema along the anterior left chest wall. 2. No acute intrathoracic process seen on this noncontrast CT of the thorax. ABDOMEN/PELVIS: 1. No acute intra-abdominal process. 2. Colonic diverticulosis without diverticulitis. 3. Enlargement of the prostate. Additional incidental findings as above. All CT scans at this facility use dose modulation, iterative reconstruction, and/or weight based dosing when appropriate to reduce radiation dose to as low as reasonably achievable. Ct Abdomen Pelvis W Iv Contrast Additional Contrast? None    Result Date: 8/14/2018  EXAM: CT of the chest without contrast CT of the abdomen and pelvis with contrast History: Trauma. Fall. Technique: Multiple contiguous axial images were obtained of the thorax from the thoracic inlet through the upper abdomen without intravenous contrast. Multiplanar reformats were obtained.  Multiple contiguous axial images of the abdomen and pelvis were obtained from the lung bases through the initial tuberosities after intravenous ministration of contrast Comparison: None available Findings: CHEST: Subcutaneous soft tissue edema is present along the left anterior chest wall. No axillary, mediastinal, or hilar lymphadenopathy. 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 No thoracic aortic aneurysm . Atherosclerotic calcification of the aortic arch. Heart size is within normal limits. No significant pericardial effusion. Coronary artery calcifications are noted. Esophagus is within normal limits. No pulmonary nodule or mass. No consolidation, pleural effusion, or pneumothorax. Minimal bilateral dependent atelectasis. Airways are patent. No bronchiectasis. Visualized upper abdominal viscera appear within normal limits. Mild multilevel degenerative changes of the thoracic spine. No acute osseous abnormality. ABDOMEN/PELVIS: The liver, gallbladder, spleen, stomach, pancreas, and adrenal glands are within normal limits. The kidneys enhance uniformly. No enhancing renal lesion identified. A small area of cortical irregularity with 5 mm calcification is present of the anterior cortex of the inferior pole of the left kidney, likely due to remote insult. No urinary tract calculi or hydronephrosis. Urinary bladder is well distended. A 9 mm posterior lateral bladder diverticulum is noted adjacent to the left ureterovesicular junction. The prostate is enlarged and demonstrates a few internal calcifications. Abdominal aorta is nonaneurysmal and demonstrates scattered mild atherosclerotic calcification. No retroperitoneal or abdominal/pelvic lymphadenopathy. No small bowel obstruction. Colonic diverticulosis without diverticulitis. No overt colonic mass or pericolonic inflammation. Appendix is not definitively visualized. No free fluid or free air. Mild degenerative changes of the lumbar spine. No acute osseous abnormality of the abdomen and pelvis. CHEST: 1. Mild subcutaneous soft tissue edema along the anterior left chest wall. 2. No acute intrathoracic process seen on this noncontrast CT of the thorax. ABDOMEN/PELVIS: 1. No acute intra-abdominal process.  2. Colonic diverticulosis without diverticulitis. 3. Enlargement of the prostate. Additional incidental findings as above. All CT scans at this facility use dose modulation, iterative reconstruction, and/or weight based dosing when appropriate to reduce radiation dose to as low as reasonably achievable. Us Carotid Artery Bilateral    Result Date: 8/16/2018  US CAROTID ARTERY BILATERAL: 8/16/2018 CLINICAL HISTORY:  STROKE . Fall, left subdural hematoma, and history of CVA COMPARISON: 8/12/2017. Grayscale, color and waveform Doppler analysis of the cervical carotid and vertebral arteries was performed. Validated velocity measurements with angiographic measurements, velocity criteria are extrapolated from diameter data as defined by the Society of Radiologist in [facility] Radiology 2003; 970;849-085. FINDINGS: There is mild atherosclerotic plaquing of the carotid bulbs, with mildly elevated velocities throughout the cervical common and internal carotid arteries. Maximum systolic velocity within the right internal and mid common carotid arteries are 100 and 132 cm/s respectively. Maximum systolic velocity within the left internal and mid common carotid arteries are 161 and 117 cm/s respectively. This results in ICA/CCA ratios of approximately 1 on the right, and 1.4 on the left, which indicates less than 50% narrowing by velocity ratio criteria. Maximum velocities within the right and left external carotid arteries are 168 and 316 cm/sec respectively. There is antegrade flow in both vertebral arteries. MILD ATHEROSCLEROTIC PLAQUING OF THE CAROTID BULBS. ELEVATED VELOCITIES THROUGHOUT, WITHOUT EVIDENCE OF A FLOW-LIMITING STENOSIS BY VELOCITY RATIO CRITERIA.        Recent Labs      08/14/18   1645   08/15/18   0738   08/16/18   0114  08/16/18   0839  08/16/18   1546   WBC  13.4*   --   9.6   --    --    --    --    HGB  14.8   < >  13.3*   < >  12.3*  13.1*  13.2*   PLT  240   --   286   --    --    --    --     < > = values in this interval not displayed. Recent Labs      08/14/18   1645  08/14/18   1714  08/15/18   0738   NA  130*   --   136   K  4.5   --   4.0   CL  91*   --   96*   CO2  23   --   25   BUN  14   --   13   CREATININE  0.88  1.0  0.86   GLUCOSE  214*   --   140*     Recent Labs      08/14/18   1645   BILITOT  0.5   ALKPHOS  53   AST  24   ALT  22     Lab Results   Component Value Date    PROTIME 10.6 08/14/2018    INR 1.0 08/14/2018     No results found for: LITHIUM, DILFRTOT, VALPROATE    ASSESSMENT AND PLAN    Left Sub-dural tentorial hematoma  Mechanical fail  Traumatic SDH  Carotid normal  Previous TIA  Has some loss of balance on and off and therefore will require rehab  Will benefit  Discussed with wife as well  Still pending ENT  If not seen today, ok transfer to rehab, ENT can see there      Madan Herrera MD, [place], American Board of Psychiatry & Neurology  Board Certified in Vascular Neurology  Board Certified in Neuromuscular Medicine  Certified in [address]

## 2018-08-18 PROBLEM — R26.9 ABNORMALITY OF GAIT AND MOBILITY: Status: ACTIVE | Noted: 2018-08-18

## 2018-08-18 PROBLEM — Z79.899 HIGH RISK MEDICATION USE: Status: ACTIVE | Noted: 2018-08-18

## 2018-08-18 PROBLEM — S02.2XXD CLOSED FRACTURE OF NASAL BONE WITH ROUTINE HEALING: Status: ACTIVE | Noted: 2018-08-18

## 2018-08-18 PROBLEM — R26.9 GAIT ABNORMALITY: Status: ACTIVE | Noted: 2018-08-18

## 2018-08-18 PROBLEM — I69.90 LATE EFFECTS OF CVA (CEREBROVASCULAR ACCIDENT): Status: ACTIVE | Noted: 2018-08-18

## 2018-08-18 LAB
HCT VFR BLD CALC: 38.7 % (ref 42–52)
HCT VFR BLD CALC: 39.4 % (ref 42–52)
HCT VFR BLD CALC: 40 % (ref 42–52)
HEMOGLOBIN: 13.1 G/DL (ref 14–18)
HEMOGLOBIN: 13.4 G/DL (ref 14–18)
HEMOGLOBIN: 13.6 G/DL (ref 14–18)

## 2018-08-18 PROCEDURE — 97535 SELF CARE MNGMENT TRAINING: CPT

## 2018-08-18 PROCEDURE — 6370000000 HC RX 637 (ALT 250 FOR IP): Performed by: INTERNAL MEDICINE

## 2018-08-18 PROCEDURE — 36415 COLL VENOUS BLD VENIPUNCTURE: CPT

## 2018-08-18 PROCEDURE — 1180000000 HC REHAB R&B

## 2018-08-18 PROCEDURE — 97110 THERAPEUTIC EXERCISES: CPT

## 2018-08-18 PROCEDURE — 97530 THERAPEUTIC ACTIVITIES: CPT

## 2018-08-18 PROCEDURE — 92610 EVALUATE SWALLOWING FUNCTION: CPT

## 2018-08-18 PROCEDURE — 99222 1ST HOSP IP/OBS MODERATE 55: CPT | Performed by: PHYSICAL MEDICINE & REHABILITATION

## 2018-08-18 PROCEDURE — 6360000002 HC RX W HCPCS: Performed by: INTERNAL MEDICINE

## 2018-08-18 PROCEDURE — 85014 HEMATOCRIT: CPT

## 2018-08-18 PROCEDURE — 85018 HEMOGLOBIN: CPT

## 2018-08-18 PROCEDURE — 6370000000 HC RX 637 (ALT 250 FOR IP): Performed by: PHYSICAL MEDICINE & REHABILITATION

## 2018-08-18 PROCEDURE — 97162 PT EVAL MOD COMPLEX 30 MIN: CPT

## 2018-08-18 PROCEDURE — 97166 OT EVAL MOD COMPLEX 45 MIN: CPT

## 2018-08-18 PROCEDURE — 97116 GAIT TRAINING THERAPY: CPT

## 2018-08-18 RX ORDER — SODIUM PHOSPHATE, DIBASIC AND SODIUM PHOSPHATE, MONOBASIC 7; 19 G/133ML; G/133ML
1 ENEMA RECTAL
Status: DISPENSED | OUTPATIENT
Start: 2018-08-18 | End: 2018-08-18

## 2018-08-18 RX ORDER — BISACODYL 10 MG
10 SUPPOSITORY, RECTAL RECTAL DAILY PRN
Status: DISCONTINUED | OUTPATIENT
Start: 2018-08-18 | End: 2018-08-20 | Stop reason: SDUPTHER

## 2018-08-18 RX ORDER — ACETAMINOPHEN 325 MG/1
650 TABLET ORAL EVERY 4 HOURS PRN
Status: DISCONTINUED | OUTPATIENT
Start: 2018-08-18 | End: 2018-08-23 | Stop reason: HOSPADM

## 2018-08-18 RX ADMIN — MELATONIN TAB 3 MG 3 MG: 3 TAB at 20:53

## 2018-08-18 RX ADMIN — LEVOTHYROXINE SODIUM 137 MCG: 137 TABLET ORAL at 05:17

## 2018-08-18 RX ADMIN — ONDANSETRON HYDROCHLORIDE 4 MG: 4 TABLET, FILM COATED ORAL at 05:18

## 2018-08-18 RX ADMIN — MAGESIUM CITRATE 296 ML: 1.75 LIQUID ORAL at 13:38

## 2018-08-18 RX ADMIN — ACETAMINOPHEN 650 MG: 325 TABLET ORAL at 20:52

## 2018-08-18 RX ADMIN — SIMVASTATIN 20 MG: 20 TABLET, FILM COATED ORAL at 20:50

## 2018-08-18 ASSESSMENT — ENCOUNTER SYMPTOMS
ABDOMINAL DISTENTION: 0
ANAL BLEEDING: 0
COUGH: 0
FACIAL SWELLING: 0
VOMITING: 0
PHOTOPHOBIA: 0
BLOOD IN STOOL: 0
EYE PAIN: 0
TROUBLE SWALLOWING: 0
NAUSEA: 0
CONSTIPATION: 0
WHEEZING: 0
CHEST TIGHTNESS: 0
ABDOMINAL PAIN: 0
CHOKING: 0
COLOR CHANGE: 0
SHORTNESS OF BREATH: 0
BOWEL INCONTINENCE: 0
EYE REDNESS: 0

## 2018-08-18 ASSESSMENT — PAIN DESCRIPTION - PROGRESSION: CLINICAL_PROGRESSION: NOT CHANGED

## 2018-08-18 ASSESSMENT — PAIN SCALES - GENERAL
PAINLEVEL_OUTOF10: 5
PAINLEVEL_OUTOF10: 0
PAINLEVEL_OUTOF10: 0
PAINLEVEL_OUTOF10: 5
PAINLEVEL_OUTOF10: 0

## 2018-08-18 ASSESSMENT — PAIN DESCRIPTION - FREQUENCY: FREQUENCY: CONTINUOUS

## 2018-08-18 ASSESSMENT — PAIN DESCRIPTION - LOCATION: LOCATION: HEAD

## 2018-08-18 ASSESSMENT — PAIN DESCRIPTION - PAIN TYPE: TYPE: ACUTE PAIN

## 2018-08-18 ASSESSMENT — PAIN DESCRIPTION - DESCRIPTORS: DESCRIPTORS: ACHING

## 2018-08-18 NOTE — PROGRESS NOTES
deviations noted, reciprocal and demonstrated no LOB, occasional narrow BASIM with turns  Stairs/Curb  Stairs?: Yes  Stairs  # Steps : 12  Stairs Height: 6\"  Rails: Right ascending  Device: No Device  Assistance: Stand by assistance  Comment: reciprocal    Activity Tolerance  Activity Tolerance: Patient Tolerated treatment well          Car transfers: Stand by Assist  Walk 10 ft: Stand by Assist  Walk 50 ft with 2 turns: Stand by Assist  Walk 150 ft in corridor: Stand by Assist  Walking 10 ft on unlevel surface: Not tested  Picking up objects: Stand by Assist  Wheelchair Mobility: No     ASSESSMENT:  Body structures, Functions, Activity limitations: Decreased functional mobility ; Decreased strength;Decreased endurance;Decreased balance;Decreased safe awareness  Decision Making: Medium Complexity    Prognosis: Good  Patient Education: rehab expectation, falls precautions, schedule, PT POC    CLINICAL IMPRESSION: Pt presents to rehab after acute hospital stay after fall at home while mowing grass. During admission pt was treated for TBI with supbdural hematoma. On this date, pt demonstrates the above impairments and would benefit from physical therapy to facilitate return to PLOF and decrease risk for falls.      PLAN OF CARE:  Frequency: 1-2 treatment sessions per day, 5-7 days per week     Current Treatment Recommendations: Strengthening, Functional Mobility Training, Neuromuscular Re-education, Home Exercise Program, Equipment Evaluation, Education, & procurement, Modalities, Safety Education & Training, Gait Training, Transfer Training, Balance Training, Endurance Training, Stair training, Patient/Caregiver Education & Training, Positioning    Patient's Goal:  \"go home, improve balance, legs stronger\"    GOALS:  Short term goals  Short term goal 1: indep with HEP to improve B LE strength  Short term goal 2: MURILLO >48/56 to demonstrate decreased risk for falls  Short term goal 3: DGI >23/24 to demonstrate improved balance while amb  Long term goals  Long term goal 1: indep with bed mobility   Long term goal 2: functional transfers with indep   Long term goal 3: amb >500ft with indep on various surfaces and terrain 0 LOB  Long term goal 4: 12 steps with 1 handrail and indep    ELOS:   Plan weeks: 4-6 days    Therapy Time:    Individual   Time In 1130   Time Out 1200   Minutes Aure Thayer 4, [address] [facility], 08/18/18 at 12:42 PM

## 2018-08-18 NOTE — PROGRESS NOTES
Physical Therapy Rehab Treatment Note  Facility/Department: [facility]  Room: [place] [address]       NAME: Jolie Kruger  : 1940 (69 y.o.)  MRN: [id number]  CODE STATUS: Full Code    Date of Service: 2018  Chart Reviewed: Yes  Patient assessed for rehabilitation services?: Yes  Family / Caregiver Present: No  Diagnosis: TBI with subdural hematoma secondary to fall    Restrictions:  Restrictions/Precautions: Fall Risk       SUBJECTIVE: Subjective: No new c/o  Pain Screening  Patient Currently in Pain: No  Pre Treatment Pain Screening  Pain at present: 0  Scale Used: Numeric Score  Intervention List: Patient able to continue with treatment    Post Treatment Pain Screening:  Pain Assessment  Pain Assessment: 0-10  Pain Level: 0    OBJECTIVE:   Overall Orientation Status: Within Normal Limits  Orientation Level: Oriented X4  Follows Commands: Within Functional Limits       Bed Mobility  Sit to supine:  Independent    Transfers  Sit to Stand: Supervision  Stand to sit: Supervision    Ambulation  More Ambulation?: Yes  Ambulation 1  Surface: level tile  Device: No Device  Assistance: Stand by assistance  Quality of Gait: Reciprocal, decrease arm swin and trunk rotation, slow braulio, no LOB  Distance: 250ft  Stairs/Curb  Stairs?: No   Stairs  Comment: not tested.  Seen bedside         Exercises  Neurodevelopmental Techniques: single step x 10 F/R  Comments: standing ex: hip abduction, marching, hip ext, knee flexion, heel raises and squats x 15 ea     ASSESSMENT/COMMENTS:  Assessment: Pt able to pathfind back to room with 1 VC for direction    PLAN OF CARE/Safety:          Therapy Time:   Individual   Time In 1320   Time Out 1350   Minutes 30     Minutes:      Transfer/Bed mobility training:      Gait training:15      Neuro re education:5     Therapeutic ex:Jerald Romero PTA, 18 at 1:52 PM

## 2018-08-18 NOTE — PROGRESS NOTES
performed hand strengthening activity in standing of placing wt. Clothespin 1-8 lbs. On and d/t fatigue finished task in sitting to remove the clothespins. Once back to room, pt transferred back into bed from w/c with SBA. Cognition  Overall Cognitive Status: WFL                                        Assessment   Performance deficits / Impairments: Decreased functional mobility ; Decreased ADL status; Decreased endurance  Assessment: Pt is a 70 y/o male who presents to [facility] d/t TBI secondary to fall. He presents with multiple contusions and fx of nasal bones. Treatment Diagnosis: TBI  Prognosis: Good  Decision Making: Medium Complexity  Exam: 5 performance deficits  REQUIRES OT FOLLOW UP: Yes  Activity Tolerance  Activity Tolerance: Patient limited by fatigue  Activity Tolerance: Mildly short of breath throughout ADL   Safety Devices  Safety Devices in place: Yes  Restraints  Initially in place: Yes          Plan   Plan  Times per week: 5-7x per week for 15-90 minutes  Times per day: Daily  Plan weeks: 3 days to 1 week  Current Treatment Recommendations: Strengthening, Balance Training, Functional Mobility Training, Endurance Training, Self-Care / ADL  G-Code     OutComes Score                                           AM-PAC Score             Goals  Patient Goals   Patient goals :  Increase independence in ADL's to return home       Therapy Time   Individual Concurrent Group Co-treatment   Time In 1450         Time Out 1550         Minutes [employer] NIMISHA Haynes Electronically signed by NIMISHA Aguilera on 8/18/2018 at 4:15 PM

## 2018-08-18 NOTE — PROGRESS NOTES
Admission Note:   Patient is alert and oriented X 4. Denies pain. Patient C/O nausea and indigestion and also requested a sleep aide. Dr. Abelardo Jin notified and gave new orders to start Tums, melatonin and P.O Zofran. Skin assessment completed. Multiple ecchymotic areas noted. Laceration above right eye with sutures intact. Skin tears noted to LUE. Abrasions to bilateral knees. Splint intact to bridge of nose. Denies dizziness, numbness, tingling and blurred vision. Neuro checks completed WDL. LBM 8/14/18. Patient received Lactulose prior to admission to Rehab.

## 2018-08-18 NOTE — PROGRESS NOTES
[facility]) -     Acute  Rehabilitation  RECREATIONAL THERAPY  Initial Evaluation    Date:  8/18/2018        Patient Name: Carie Brochure Day       MRN: [id number]        YOB: 1940 (80 y.o.)       Gender: male          RESTRICTIONS/PRECAUTIONS:  Restrictions/Precautions: Fall Risk  Vision: Within Functional Limits  Hearing: Within functional limits    Patient seen at: 1410 25 min  [x]Referral received  [x]Chart reviewed   []Visitor present     SUBJECTIVE: Patient reports the reason for their presence on the acute rehab unit as \"instead of mowing my lawn on two days I did it in one\". \"my balance hasn't been the same since my stroke three years ago\". Prior to admission, patient engaged in leisure/recreation on a regular basis:  []always  [x]sometimes  []never []rarely []Not applicable  Patient reports he/she is comfortable in:  [] socializing with others  [] spending time alone [x]comfortable with both scenarios   Patient reports pain is a  []new current issue  []chronic issue  [x]Not an issue at the moment []other:      OBJECTIVE:  Pt is []up in wheelchair []standard sitting chair  [x]in bed.      Oxygen []yes   [x]no      Ability to provide information regarding leisure and recreation interests:   [x]No issues              []Issues with cognition (memory, confusion)              []Issues with communication (speech, hearing)              []Issues with mood    Leisure Interest Survey:  Solitaire              Sports/Physical Activity      Community    [] Creative Arts   [] Exercise bike at home  [x] Auctions/[employer]    [] [place]   [] Swimming/water exercise    [x] Shopping    [x] Computers/Internet  [] [employer]                         [] Clubs/Organizations    [x] Gardening/yardwork          [] Golf               [x] [employer]    [] Cooking/Baking    [] [employer]            [] Zoroastrian/Yazdanism activities    [x] Reading    []Exercise class               []  Gambling/Casino    [] Word []ONGOING   Patient will express interest in participating in the Pet Therapy program during their hospitalization. [x]COMPLETE []ONGOING Patient will indicate current and prior leisure/recreational interests and state those they will follow through with post discharge.     Jose Linton []ONGOING  Patient will be provided with an independent leisure activity/coping skills tool for in room use.   []COMPLETE []ONGOING Patient encouraged to use their own personal independent leisure activity for in room use. He/she brought    [x]COMPLETE []ONGOING  Patient will be provided with a cognitive/orientation/puzzle handout 5x/week.   []COMPLETE []ONGOING  Patient will be provided with local community resources to maintain a healthy  active lifestyle after discharge.   []COMPLETE []ONGOING  Patient will be educated on adaptive leisure/recreation equipment.   []COMPLETE []ONGOING Patient will be educated on various alternative pain management techniques which  can include both techniques done independently and/or in group programs.    []COMPLETE []ONGOING Patient will express interest in participating in Music Therapy during his hospitalization.  []COMPLETE []ONGOING Patient will identify the recreation therapy supported groups he has interest in attending.  Jose Linton []ONGOING Patient will be    []COMPLETE []ONGOING Patient will     Patient provided with the following accessible and independently used recreation/leisure resources when in hospital room:   [x] 5 days week large print orientation/puzzle activity handout  [] Word searches, crossword puzzles  [] Creative art independent use kit  [] Journaling kit  [] Creative writing kit  [] Deck of Cards  []     EDUCATION   Was new education provided:  [x] Yes     [] No - Continued review of prior education  Learner:    [x] Patient     [] Spouse/Significant Other     [] Family     [] Other:  Education provided:     [x][facility]   []Community Resources:    [][facility]   []Stress Management/Relaxation techniques   []Other:  Method of Education: [x] Discussion      [] Demonstration     [] Handouts     [] Other  Evaluation of Patients Response to Education:   [x] Verbalized Understanding            [] Demonstrated without assistance        [] Demonstrated with assistance     [] No Evidence of learning             [x] No family present                           FIMS  Comprehension: 7 - Patient understands complex ideas (math/planning)  Expression: 7 - Patient expresses complex ideas/needs  Social Interaction: 7 - Patient has appropriate behavior/relations 100% of the time  Problem Solvin - Independent with device (e.g. notes, schedules)  Memory: 6 - Patient requires device to recall (e.g. memory book)    Electronically signed by: Roberto Moreno  Date: 2018   Electronically signed by Roberto Moreno on 2018 at 2:51 PM

## 2018-08-18 NOTE — PROGRESS NOTES
Facility/Department: [facility]   BEDSIDE SWALLOW EVALUATION    NAME: Ale Kruger  : 1940  MRN: [id number]    ADMISSION DATE: 2018  REHAB DIAGNOSIS(ES):TBI  ADMITTING DIAGNOSIS: has Type 2 diabetes mellitus with hyperglycemia, with long-term current use of insulin ([facility] [address] Hypothyroidism; Hypertension; Hyperlipidemia; Impaired mobility and activities of daily living; Post-traumatic subdural hematoma ([facility] [address] Late effects of CVA (cerebrovascular accident); Gait abnormality dt TBI -acute rehab admission 18; Closed fracture of nasal bone with routine healing; High risk medication use-Ultram; and Abnormality of gait and mobility on his problem list.    ONSET DATE: 18    Date of Eval: 2018  Evaluating Therapist: Carla Celaya    Recommended Diet and Intervention  Diet Solids Recommendation: Regular  Liquid Consistency Recommendation: Thin  Recommended Form of Meds: Whole with water    Reason for Referral  Nobie Collet A Day was referred for a bedside swallow evaluation to assess the efficiency of his swallow function, identify signs and symptoms of aspiration and make recommendations regarding safe dietary consistencies, effective compensatory strategies, and safe eating environment. General  Chart Reviewed: Yes  Behavior/Cognition: Alert; Cooperative;Pleasant mood  Respiratory Status: Room air  Follows Directions: Simple  Dentition: Dentures top;Dentures bottom  Patient Positioning: Upright in chair  Baseline Vocal Quality: Normal  Consistencies Administered: Thin;Thin - cup;Reg solid        Current Diet level:  Current Diet : Regular  Current Liquid Diet : Thin    Oral Motor Deficits  Oral/Motor  Oral Motor:  Within functional limits    Oral Phase Dysfunction  Oral Phase  Oral Phase: WFL  Oral Phase  Oral Phase - Comment: WFL at time of BSE     Indicators of Pharyngeal Phase Dysfunction   Pharyngeal Phase  Pharyngeal Phase: WFL  Pharyngeal Phase   Pharyngeal: WFL at time of BSE    Impression  Dysphagia Diagnosis: Swallow function appears grossly intact  Dysphagia Outcome Severity Scale: Level 7: Normal in all situations     Treatment Plan  Requires SLP Intervention: No  Duration/Frequency of Treatment: N/A -no f/u swallow          Treatment/Goals  Short-term Goals  Timeframe for Short-term Goals: N/A  Goal 1: N/A; no f/u swllaow  Long-term Goals  Timeframe for Long-term Goals: N/A  Goal 1: N/A; no f/u swllaow      Prognosis  Prognosis  Prognosis for safe diet advancement:  (No change (reg/thin))  Individuals consulted  Consulted and agree with results and recommendations: Patient; Family member;RN Jeet Lopez)  Family member consulted: Pt's wife    Education  Patient Education Response: Verbalizes understanding      [x]  Ana Lilia FUENTES notified   [x]  Dr. Fany Vidal notified via voicemail  []  OT/PT Departments notified via mailboxes    Pain:  Pain Assessment  Patient Currently in Pain: Denied pain    Therapy Time  SLP Individual Minutes  Time In: 1250  Time Out: 9431  Minutes: [id number], Venus Rosado, Date: 8/18/2018, Time: 1:43 PM

## 2018-08-18 NOTE — PLAN OF CARE
Problem: IP SWALLOWING  Goal: LTG - patient will tolerate the least restrictive diet consistency to allow for safe consumption of daily meals  Outcome: Completed Date Met: 08/18/18  BSE completed.

## 2018-08-18 NOTE — PROGRESS NOTES
good safety awareness      [x]  Patient will perform basic room mobility at Independent level. [x]  Patient will access appropriate D/C site with as few architectural barriers as possible. []  Patient and/or caregiver will demonstrate understanding of hip precautions with  verbal/tactile cues. []  Patient and/or caregiver will demonstrate understanding of energy conservation techniques with  verbal/tactile cues. [x]  Patient and/or caregiver will demonstrate understanding of recommended HEP for strength and endurance.         []  Patient's cognition will improve to safely perform ADLs:  Comprehension:   Expression:   Social Interaction:   Problem Solving:   Memory:         Therapy Time   Individual Concurrent Group Co-treatment   Time In 0900         Time Out 1000         Minutes Zena Mcpherson OT

## 2018-08-18 NOTE — H&P
HISTORY & PHYSICAL       DATE OF ADMISSION:  8/17/2018    DATE OF SERVICE:  8/18/18    Subjective:    Nicole Kruger, 69 y.o. male presents today with:     CHIEF COMPLAINT:  71-year-old male with a history of gait ataxia secondary to previous CVA unfortunately fell at home injuring his face skull and brain. He was admitted to [facility] on 8/14/18 and gave a history that he had been mowing his lawn felt weak got off the riding lawnmower was walking of his driveway when he fell he hit his face and head and lost consciousness. He and his wife denies seizure-like activity. He was confused and initially after he came to. He was weak and unsteady. CT of the brain revealed an acute subdural hematoma along the left tentorium and the falx. Patient was also diagnosed with bilateral nasal bone fractures and seen by an ear nose and throat doctor. He is currently wearing a nasal splint and his wife seems to think that he has a internal splint as well. He is to follow-up with Dr. Alize Cantu as an outpatient. Other workup included urinary analysis revealed glucose and ketones, CBCs was revealed as drop of his H&H 15 and 42-12 and 37 EKG which revealed occasional PVCs and sinus rhythm with left axis deviation and a left bundle branch block. He was determined to have cognitive, mobility and ADL deficits consistent with needing acute level rehabilitation and admitted to [facility] acute rehabilitation on 8/17/18. Neurologic Problem   The patient's primary symptoms include weakness. The patient's pertinent negatives include no syncope. This is a recurrent problem. The current episode started in the past 7 days. The neurological problem developed suddenly. The problem has been gradually improving since onset. There was no focality noted. Associated symptoms include fatigue and light-headedness.  Pertinent negatives include no abdominal pain, bladder incontinence, bowel incontinence, chest pain, confusion, shower  Bathroom Equipment: Shower chair  ADL Assistance: Independent  Homemaking Assistance: Independent  Homemaking Responsibilities: Yes (shared)  Ambulation Assistance: Independent (no DME)  Transfer Assistance: Independent        Prior Function; everyday activities:      Patient states that he was independent with all ADLs mobility prior to admission    Prior Device(s) used:     Denied assistive device    History of falls:    Denied falling other than the fall but brought in here. In depth analysis of complex functional data; the patient has been:    Current Rehabilitation Assessments:    Physical therapy: FIMS:  Bed Mobility: Scooting: Independent    Transfers: Sit to Stand: Supervision  Stand to sit: Supervision  Bed to Chair: Supervision  Stand Pivot Transfers: Supervision, Ambulation 1  Surface: level tile  Device: No Device  Assistance: Stand by assistance  Distance: 175ft, 350ft  Comments: mild path deviations noted, reciprocal and demonstrated no LOB, occasional narrow BASIM with turns, Stairs  # Steps : 12  Stairs Height: 6\"  Rails: Right ascending  Device: No Device  Assistance: Stand by assistance  Comment: reciprocal    FIMS: Bed, Chair, Wheel Chair: 5 - Requires setup/supervision/cues,  , Assessment: Pt presents to rehab after acute hospital stay after fall at home while mowing grass. During admission pt was treated for TBI with supbdural hematoma. On this date, pt demonstrates the above impairments and would benefit from physical therapy to facilitate return to PLOF and decrease risk for falls.      Occupational therapy: FIMS:  Eatin - Patient feeds self  Grooming:  (ADL)  Bathing:  (ADL)  Dressing-Upper:  (ADL)  Dressing-Lower:  (ADL)  Toiletin - Requires setup/supervision/cues  Toilet Transfer: 5 - Requires setup/supervision/cues,  ,      Speech therapy: FIMS: Comprehension: 6 - Complex ideas 90% or device (hearing aid/glasses)  Expression: 7 - Patient expresses complex leg swelling. Gastrointestinal: Negative for abdominal distention, abdominal pain, anal bleeding, blood in stool, bowel incontinence, constipation, nausea and vomiting. Genitourinary: Negative for bladder incontinence, difficulty urinating, dysuria, flank pain, frequency and urgency. Musculoskeletal: Positive for arthralgias, gait problem and myalgias. Negative for neck pain and neck stiffness. Skin: Negative for color change, pallor, rash and wound. Neurological: Positive for speech difficulty, weakness, light-headedness and numbness. Negative for dizziness, tremors, syncope, facial asymmetry and headaches. Hematological: Negative for adenopathy. Does not bruise/bleed easily. Psychiatric/Behavioral: Positive for sleep disturbance. Negative for agitation, behavioral problems, confusion, decreased concentration, dysphoric mood, hallucinations, self-injury and suicidal ideas. The patient is not nervous/anxious and is not hyperactive. All other systems reviewed and are negative. Objective  BP (!) 150/70   Pulse 54   Temp 98 °F (36.7 °C) (Oral)   Resp 16   Ht 6' (1.829 m)   Wt 240 lb (108.9 kg)   SpO2 94%   BMI 32.55 kg/m² *    Physical Exam   Constitutional: He is oriented to person, place, and time. Vital signs are normal. He appears well-developed and well-nourished. Non-toxic appearance. He does not have a sickly appearance. He does not appear ill. No distress. HENT:   Head: Normocephalic. Head is with raccoon's eyes, with abrasion and with contusion. Right Ear: Hearing normal.   Left Ear: Hearing normal.   Nose: Nose normal.   Mouth/Throat: Oropharynx is clear and moist and mucous membranes are normal. No oral lesions. Normal dentition. No oropharyngeal exudate. Severe bruising around his eyes and forehead   Eyes: Pupils are equal, round, and reactive to light. Conjunctivae and EOM are normal. Right eye exhibits no chemosis, no discharge and no exudate.  Left eye exhibits no chemosis, no discharge and no exudate. No scleral icterus. Neck: Normal range of motion. Neck supple. No JVD present. No neck rigidity. No tracheal deviation and no edema present. No thyromegaly present. Cardiovascular: Intact distal pulses. Exam reveals no decreased pulses. Pulmonary/Chest: Effort normal. No accessory muscle usage. No apnea, no tachypnea and no bradypnea. No respiratory distress. He has decreased breath sounds. He has no wheezes. He has no rales. He exhibits no tenderness. Abdominal: Soft. Bowel sounds are normal. He exhibits no distension and no mass. There is no tenderness. There is no rebound and no guarding. Musculoskeletal: He exhibits tenderness. He exhibits no edema. Right shoulder: Normal.        Left shoulder: Normal.        Right elbow: Normal.       Left elbow: Normal.        Right wrist: Normal.        Left wrist: Normal.        Right hip: Normal.        Left hip: Normal.        Right knee: Normal.        Left knee: Normal.        Right ankle: Normal. Achilles tendon normal.        Left ankle: Normal. Achilles tendon normal.        Cervical back: Normal.        Thoracic back: Normal.        Lumbar back: He exhibits decreased range of motion, tenderness, bony tenderness and pain. He exhibits no swelling, no edema, no deformity, no laceration and normal pulse. Right upper arm: Normal.        Left upper arm: Normal.        Right forearm: Normal.        Left forearm: Normal.        Right hand: Normal.        Left hand: Normal.        Right upper leg: Normal.        Left upper leg: Normal.        Right lower leg: Normal.        Left lower leg: Normal.        Right foot: Normal.        Left foot: Normal.   Tender areas are indicated by numbered spot         Lymphadenopathy:     He has no cervical adenopathy. He has no axillary adenopathy. Right: No inguinal adenopathy present. Left: No inguinal adenopathy present.    Neurological: He is alert and oriented to person, place, and time. He displays abnormal reflex. He displays no atrophy and no tremor. A sensory deficit is present. No cranial nerve deficit. He exhibits normal muscle tone. He has an abnormal Finger-Nose-Finger Test, an abnormal Heel to [employer], an abnormal Romberg Test and an abnormal Tandem Gait Test. Gait abnormal. Coordination normal. He displays no Babinski's sign on the right side. He displays no Babinski's sign on the left side. Reflex Scores:       Tricep reflexes are 1+ on the right side and 1+ on the left side. Bicep reflexes are 1+ on the right side and 1+ on the left side. Brachioradialis reflexes are 1+ on the right side and 1+ on the left side. Patellar reflexes are 1+ on the right side and 0 on the left side. Achilles reflexes are 0 on the right side and 0 on the left side. Skin: Skin is warm, dry and intact. No abrasion, no bruising, no ecchymosis, no laceration, no petechiae and no rash noted. Rash is not macular, not pustular and not urticarial. He is not diaphoretic. No cyanosis or erythema. No pallor. Nails show no clubbing. Psychiatric: He has a normal mood and affect. His behavior is normal. Judgment and thought content normal. His mood appears not anxious. His affect is not angry, not blunt, not labile and not inappropriate. His speech is delayed. His speech is not rapid and/or pressured, not tangential and not slurred. He is not agitated, not aggressive, not hyperactive, not slowed, not withdrawn, not actively hallucinating and not combative. Thought content is not paranoid and not delusional. Cognition and memory are not impaired. He does not express impulsivity or inappropriate judgment. He does not exhibit a depressed mood. He expresses no homicidal and no suicidal ideation. He expresses no suicidal plans and no homicidal plans. He is communicative. He exhibits abnormal recent memory. He exhibits normal remote memory.  He is and be discussed at regular treatment team meetings to assess progress, mobility, self care, mood and discharge issues. Physical therapy will be consulted for mobility and endurance issues and should be performed 1 to 2 times per day, 7 days per week for the length of stay. Occupational therapy will be consulted for activities of daily living and should be performed 1 to 2 times per day, 7 days per week for the length of stay. Their capacity to participate at an acute level, decision to be treated in the gym, room or on the unit, their activity goals for the day and the number of minutes of active participation will be reassessed and re-prescribed daily. Because this patient is medically complex, I will check a CBC, BMP, UA and orthostatic blood pressures. They will be reassessed daily regarding their ability to participate in an acute level rehabilitation program.  Recreational Therapy will be consulted for community re-entry and adjustment to disability. Communication, cognitive and emotional issues will also be addressed during this rehabilitation stay by rehabilitation psychologist or speech therapist as appropriate. I reviewed the patient's old and current charts and discussed other rehabilitation options with the rehabilitation team including the rehab RN and the admission team as well as the patient. I feel that the patient's functional recovery would be best served at an acute inpatient rehabilitation program because the patient needs intense therapy three hours per day, direct RN supervision and daily monitoring by a physician for medical status. This cannot be sufficiently provided by home health care, a skilled nursing facility or in an outpatient setting. I further feel that the patient has the potential to improve functional abilities in an acute intensive rehabilitation program.    Old records were reviewed and summarized.     2.  Other diagnoses which complicate rehabilitation stay

## 2018-08-19 ENCOUNTER — APPOINTMENT (OUTPATIENT)
Dept: GENERAL RADIOLOGY | Age: 78
DRG: 950 | End: 2018-08-19
Attending: PHYSICAL MEDICINE & REHABILITATION
Payer: MEDICARE

## 2018-08-19 LAB
HCT VFR BLD CALC: 39.4 % (ref 42–52)
HCT VFR BLD CALC: 39.5 % (ref 42–52)
HCT VFR BLD CALC: 39.9 % (ref 42–52)
HEMOGLOBIN: 13.5 G/DL (ref 14–18)
HEMOGLOBIN: 13.5 G/DL (ref 14–18)
HEMOGLOBIN: 13.6 G/DL (ref 14–18)
MCH RBC QN AUTO: 30.4 PG (ref 27–31.3)
MCHC RBC AUTO-ENTMCNC: 34 % (ref 33–37)
MCV RBC AUTO: 89.3 FL (ref 80–100)
PDW BLD-RTO: 14.2 % (ref 11.5–14.5)
PLATELET # BLD: 288 K/UL (ref 130–400)
RBC # BLD: 4.42 M/UL (ref 4.7–6.1)
URINE CULTURE, ROUTINE: NORMAL
WBC # BLD: 8.3 K/UL (ref 4.8–10.8)

## 2018-08-19 PROCEDURE — 36415 COLL VENOUS BLD VENIPUNCTURE: CPT

## 2018-08-19 PROCEDURE — 6370000000 HC RX 637 (ALT 250 FOR IP): Performed by: PHYSICAL MEDICINE & REHABILITATION

## 2018-08-19 PROCEDURE — 96125 COGNITIVE TEST BY HC PRO: CPT

## 2018-08-19 PROCEDURE — 6370000000 HC RX 637 (ALT 250 FOR IP): Performed by: INTERNAL MEDICINE

## 2018-08-19 PROCEDURE — 92523 SPEECH SOUND LANG COMPREHEN: CPT

## 2018-08-19 PROCEDURE — 85018 HEMOGLOBIN: CPT

## 2018-08-19 PROCEDURE — 1180000000 HC REHAB R&B

## 2018-08-19 PROCEDURE — 74018 RADEX ABDOMEN 1 VIEW: CPT

## 2018-08-19 PROCEDURE — 85027 COMPLETE CBC AUTOMATED: CPT

## 2018-08-19 PROCEDURE — 85014 HEMATOCRIT: CPT

## 2018-08-19 RX ORDER — HYDRALAZINE HYDROCHLORIDE 25 MG/1
25 TABLET, FILM COATED ORAL 3 TIMES DAILY PRN
Status: DISCONTINUED | OUTPATIENT
Start: 2018-08-19 | End: 2018-08-23 | Stop reason: HOSPADM

## 2018-08-19 RX ADMIN — TRAMADOL HYDROCHLORIDE 50 MG: 50 TABLET, FILM COATED ORAL at 20:36

## 2018-08-19 RX ADMIN — SIMVASTATIN 20 MG: 20 TABLET, FILM COATED ORAL at 20:36

## 2018-08-19 RX ADMIN — TRAMADOL HYDROCHLORIDE 50 MG: 50 TABLET, FILM COATED ORAL at 12:03

## 2018-08-19 RX ADMIN — MAGESIUM CITRATE 296 ML: 1.75 LIQUID ORAL at 13:16

## 2018-08-19 RX ADMIN — PSYLLIUM HUSK 1 PACKET: 3.4 POWDER ORAL at 09:15

## 2018-08-19 RX ADMIN — ACETAMINOPHEN 650 MG: 325 TABLET ORAL at 12:03

## 2018-08-19 RX ADMIN — HYDRALAZINE HYDROCHLORIDE 25 MG: 25 TABLET, FILM COATED ORAL at 20:51

## 2018-08-19 RX ADMIN — ACETAMINOPHEN 650 MG: 325 TABLET ORAL at 20:35

## 2018-08-19 RX ADMIN — LEVOTHYROXINE SODIUM 137 MCG: 137 TABLET ORAL at 06:31

## 2018-08-19 ASSESSMENT — PAIN DESCRIPTION - DESCRIPTORS: DESCRIPTORS: ACHING

## 2018-08-19 ASSESSMENT — PAIN DESCRIPTION - ORIENTATION: ORIENTATION: LEFT

## 2018-08-19 ASSESSMENT — PAIN DESCRIPTION - PAIN TYPE: TYPE: ACUTE PAIN

## 2018-08-19 ASSESSMENT — PAIN SCALES - GENERAL
PAINLEVEL_OUTOF10: 1
PAINLEVEL_OUTOF10: 2
PAINLEVEL_OUTOF10: 5
PAINLEVEL_OUTOF10: 9
PAINLEVEL_OUTOF10: 5

## 2018-08-19 ASSESSMENT — PAIN DESCRIPTION - LOCATION: LOCATION: HEAD

## 2018-08-19 ASSESSMENT — PAIN - FUNCTIONAL ASSESSMENT: PAIN_FUNCTIONAL_ASSESSMENT: 0-10

## 2018-08-19 ASSESSMENT — PAIN DESCRIPTION - FREQUENCY: FREQUENCY: CONTINUOUS

## 2018-08-19 NOTE — PROGRESS NOTES
Subjective: The patient complains of moderate  acute pain relieved by rest and exacerbated by activity. I am concerned about patients fatigue. ROS x10: The patient also complains of severely impaired mobility and activities of daily living. Otherwise no new problems with vision, hearing, nose, mouth, throat, dermal, cardiovascular, GI, , pulmonary, musculoskeletal, psychiatric or neurological. See Rehab H&P on Rehab chart dated . Vital signs:  BP (!) 170/81   Pulse 64   Temp 97 °F (36.1 °C) (Oral)   Resp 16   Ht 6' (1.829 m)   Wt 240 lb (108.9 kg)   SpO2 96%   BMI 32.55 kg/m²   I/O:   PO/Intake:  fair PO intake, no problems observed or reported. Bowel/Bladder:  continent, no problems noted. General:  Patient is well developed, adequately nourished, non-obese and     well kempt. HEENT:    PERRLA, hearing intact to loud voice, external inspection of ear     and nose benign. Inspection of lips, tongue and gums benign  Musculoskeletal: No significant change in strength or tone. All joints stable. Inspection and palpation of digits and nails show no clubbing,       cyanosis or inflammatory conditions. Neuro/Psychiatric: Affect: flat but pleasant. Alert and oriented to person, place and     situation. No significant change in deep tendon reflexes or     sensation  Lungs:  CTA-B. Respiration effort is normal at rest.     Heart:   S1 = S2, RRR. No loud murmurs. Abdomen:  Soft, non-tender, no enlargement of liver or spleen. Extremities:  No significant lower extremity edema or tenderness. Skin:   Intact to general survey, no visualized or palpated problems.     Rehabilitation:  Physical therapy: FIMS:  Bed Mobility: Sit to Supine: Independent  Scooting: Independent    Transfers: Sit to Stand: Supervision  Stand to sit: Supervision  Bed to Chair: Supervision  Stand Pivot Transfers: Supervision, Ambulation 1  Surface: level tile  Device: No Device  Assistance: Stand by THE INNER HEMISPHERIC FISSURE/FALX. UNCHANGED. OTHER FINDINGS DETAILED ABOVE All CT scans at this facility use dose modulation, iterative reconstruction, and/or weight based dosing when appropriate to reduce radiation dose to as low as reasonably achievable. Ct Head Wo Contrast    Result Date: 8/15/2018  CT HEAD WO CONTRAST CLINICAL HISTORY: Subdural hematoma, follow-up, history of fall. COMPARISON: August 14, 2018 at 1937 hours. TECHNIQUE: Multiple unenhanced serial axial images of the brain from the vertex of the skull to the base of the skull were performed. FINDINGS: The ventricles are dilated. This is compensatory to the surrounding moderate generalized parenchymal volume loss. .  No midline shift. The cisterns are patent. There are white matter and periventricular changes most likely consistent with chronic small vessel disease. Again note is made of a Acute subdural hematoma present along the left tentorium and falx , measuring up to 9 mm in greatest thickness along the tentorium and 3 mm in thickness along the falx. Unchanged. The visualized osseous structures show fractures of the left and right nasal bones. The visualized portion of the paranasal sinuses, and mastoids are unremarkable. Note is made of soft tissue swelling hematoma overlying the right for head, supraorbital region, right preseptal, right premaxillary and perinasal soft tissue swelling. ACUTE SUBDURAL HEMATOMA PRESENT ALONG THE LEFT TENTORIUM AND FALX. UNCHANGED. OTHER FINDINGS DETAILED ABOVE All CT scans at this facility use dose modulation, iterative reconstruction, and/or weight based dosing when appropriate to reduce radiation dose to as low as reasonably achievable. Ct Head Wo Contrast    Result Date: 8/14/2018  EXAMINATION: CT of the brain without contrast HISTORY: Fall. COMPARISON: TECHNIQUE: Multiple contiguous axial images were obtained of the brain from the skull base through the vertex.  Multiplanar reformats were Technique: Multiple contiguous axial images were obtained of the thorax from the thoracic inlet through the upper abdomen without intravenous contrast. Multiplanar reformats were obtained. Multiple contiguous axial images of the abdomen and pelvis were obtained from the lung bases through the initial tuberosities after intravenous ministration of contrast Comparison: None available Findings: CHEST: Subcutaneous soft tissue edema is present along the left anterior chest wall. No axillary, mediastinal, or hilar lymphadenopathy. 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 No thoracic aortic aneurysm . Atherosclerotic calcification of the aortic arch. Heart size is within normal limits. No significant pericardial effusion. Coronary artery calcifications are noted. Esophagus is within normal limits. No pulmonary nodule or mass. No consolidation, pleural effusion, or pneumothorax. Minimal bilateral dependent atelectasis. Airways are patent. No bronchiectasis. Visualized upper abdominal viscera appear within normal limits. Mild multilevel degenerative changes of the thoracic spine. No acute osseous abnormality. ABDOMEN/PELVIS: The liver, gallbladder, spleen, stomach, pancreas, and adrenal glands are within normal limits. The kidneys enhance uniformly. No enhancing renal lesion identified. A small area of cortical irregularity with 5 mm calcification is present of the anterior cortex of the inferior pole of the left kidney, likely due to remote insult. No urinary tract calculi or hydronephrosis. Urinary bladder is well distended. A 9 mm posterior lateral bladder diverticulum is noted adjacent to the left ureterovesicular junction. The prostate is enlarged and demonstrates a few internal calcifications. Abdominal aorta is nonaneurysmal and demonstrates scattered mild atherosclerotic calcification. No retroperitoneal or abdominal/pelvic lymphadenopathy. No small bowel obstruction. Colonic diverticulosis without diverticulitis.  No light to get nursing assistance to get up, bed and chair alarm. 8. Elevated DVT risk: progressive activities in PT, continue prophylaxis KELSY hose, elevation and   .  9. Complex discharge planning:  Weekly team meeting every Monday to assess progress towards goals, discuss and address social, psychological and medical comorbidities and to address difficulties they may be having progressing in therapy. Patient and family education is in progress. The patient is to follow-up with their family physician after discharge. Complex Active General Medical Issues that complicate care Assess & Plan:    Patient Active Problem List   Diagnosis    Type 2 diabetes mellitus with hyperglycemia, with long-term current use of insulin ([facility] [address]    Hypothyroidism    Hypertension    Hyperlipidemia    Impaired mobility and activities of daily living    Post-traumatic subdural hematoma ([facility])    Late effects of CVA (cerebrovascular accident)    Gait abnormality dt TBI -acute rehab admission 8/17/18    Closed fracture of nasal bone with routine healing    High risk medication use-Ultram    Abnormality of gait and mobility   1.    Naina Harvey D.O., PM&R     Attending    [facility] Jami Rodriguez

## 2018-08-19 NOTE — PROGRESS NOTES
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  DATE ONSET: 2018    Date of Eval: 2018   Evaluating Therapist: Servando Fried, SLP    Assessment:      Diagnosis: Pt presents with functional speech and language. Administration of the MoCA revealed a mild cognitive impairment. Pt and spouse declined speech services at this time, reporting no cognitive changes noted since his fall. Recommendations:  Requires SLP Intervention: No          Subjective:   Previous level of function and limitations: Independent. Per pt and spouse report, pt did not need speech therapy following his stroke in . He received PT and/or OT at [address] [place] [address] then went for outpatient at [facility].  General  Chart Reviewed: Yes     Vision  Vision: Impaired  Vision Exceptions: Wears glasses for reading  Hearing  Hearing: Within functional limits           Objective: Auditory Comprehension  Comprehension: Within Functional Limits         Expression  Primary Mode of Expression: Verbal    Verbal Expression  Verbal Expression: Within functional limits    Written Expression  Dominant Hand: Right    Motor Speech  Motor Speech:  Within Functional Limits    Pragmatics/Social Functioning  Pragmatics: Within functional limits    Cognition: Independent   []7 - Indpendent   [x]6 - Modified Independent  []6 - Modified Independent   []5 - Supervision   [x]5 - Supervision   []4 - Min Assist   []4 - Min Assist   []3 - Mod Assist   []3 - Mod Assist   []2 - Max Assist   []2 - Max Assist   []1 - Dependent   []1 - Dependent    Problem Solving        Memory   []7 - Independent   []7 - Independent   []6 - Modified Independent  []6 - Modified Independent   [x]5 - Supervision   []5 - Supervision   []4 - Min Assist   [x]4 - Min Assist   []3 - Mod Assist   []3 - Mod Assist   []2 - Max Assist   []2 - Max Assist   []1 - Dependent   [] 1 -Dependent                  Therapy Time:   Individual Concurrent Group Co-treatment   Time In 0920         Time Out 1000         Minutes 40                 Austen Ji Asa, Date: 8/19/2018, Time: 10:02 AM

## 2018-08-19 NOTE — PLAN OF CARE
Problem: IP COMMUNICATION/DYSARTHRIA  Goal: LTG - Patient will improve receptive language skills to allow for completion of daily activities  Outcome: Met This Shift  SLE completed

## 2018-08-20 PROBLEM — Z78.9 ALCOHOL USE: Status: ACTIVE | Noted: 2018-08-20

## 2018-08-20 PROBLEM — F10.90 ALCOHOL USE: Status: ACTIVE | Noted: 2018-08-20

## 2018-08-20 LAB
HCT VFR BLD CALC: 41.7 % (ref 42–52)
HEMOGLOBIN: 14.1 G/DL (ref 14–18)

## 2018-08-20 PROCEDURE — 6370000000 HC RX 637 (ALT 250 FOR IP): Performed by: PHYSICAL MEDICINE & REHABILITATION

## 2018-08-20 PROCEDURE — 97112 NEUROMUSCULAR REEDUCATION: CPT

## 2018-08-20 PROCEDURE — 97116 GAIT TRAINING THERAPY: CPT

## 2018-08-20 PROCEDURE — 36415 COLL VENOUS BLD VENIPUNCTURE: CPT

## 2018-08-20 PROCEDURE — 85014 HEMATOCRIT: CPT

## 2018-08-20 PROCEDURE — 85018 HEMOGLOBIN: CPT

## 2018-08-20 PROCEDURE — 97127 HC OT THER IVNTJ W/FOCUS COG FUNCJ: CPT

## 2018-08-20 PROCEDURE — 1180000000 HC REHAB R&B

## 2018-08-20 PROCEDURE — 97150 GROUP THERAPEUTIC PROCEDURES: CPT

## 2018-08-20 PROCEDURE — 97110 THERAPEUTIC EXERCISES: CPT

## 2018-08-20 PROCEDURE — 97535 SELF CARE MNGMENT TRAINING: CPT

## 2018-08-20 PROCEDURE — 6370000000 HC RX 637 (ALT 250 FOR IP): Performed by: INTERNAL MEDICINE

## 2018-08-20 RX ORDER — LACTULOSE 10 G/15ML
20 SOLUTION ORAL 2 TIMES DAILY PRN
Status: DISCONTINUED | OUTPATIENT
Start: 2018-08-20 | End: 2018-08-23 | Stop reason: HOSPADM

## 2018-08-20 RX ORDER — HYDRALAZINE HYDROCHLORIDE 25 MG/1
25 TABLET, FILM COATED ORAL ONCE
Status: COMPLETED | OUTPATIENT
Start: 2018-08-21 | End: 2018-08-21

## 2018-08-20 RX ORDER — BISACODYL 10 MG
10 SUPPOSITORY, RECTAL RECTAL DAILY PRN
Status: DISCONTINUED | OUTPATIENT
Start: 2018-08-20 | End: 2018-08-23 | Stop reason: HOSPADM

## 2018-08-20 RX ADMIN — TRAMADOL HYDROCHLORIDE 50 MG: 50 TABLET, FILM COATED ORAL at 20:17

## 2018-08-20 RX ADMIN — HYDRALAZINE HYDROCHLORIDE 25 MG: 25 TABLET, FILM COATED ORAL at 20:17

## 2018-08-20 RX ADMIN — ACETAMINOPHEN 650 MG: 325 TABLET ORAL at 14:57

## 2018-08-20 RX ADMIN — SIMVASTATIN 20 MG: 20 TABLET, FILM COATED ORAL at 21:23

## 2018-08-20 RX ADMIN — ACETAMINOPHEN 650 MG: 325 TABLET ORAL at 10:49

## 2018-08-20 RX ADMIN — TRAMADOL HYDROCHLORIDE 50 MG: 50 TABLET, FILM COATED ORAL at 10:48

## 2018-08-20 RX ADMIN — PSYLLIUM HUSK 1 PACKET: 3.4 POWDER ORAL at 08:06

## 2018-08-20 RX ADMIN — HYDRALAZINE HYDROCHLORIDE 25 MG: 25 TABLET, FILM COATED ORAL at 05:36

## 2018-08-20 RX ADMIN — LEVOTHYROXINE SODIUM 137 MCG: 137 TABLET ORAL at 05:36

## 2018-08-20 RX ADMIN — ACETAMINOPHEN 650 MG: 325 TABLET ORAL at 23:41

## 2018-08-20 ASSESSMENT — PAIN DESCRIPTION - ONSET: ONSET: ON-GOING

## 2018-08-20 ASSESSMENT — PAIN DESCRIPTION - PROGRESSION: CLINICAL_PROGRESSION: NOT CHANGED

## 2018-08-20 ASSESSMENT — PAIN SCALES - GENERAL
PAINLEVEL_OUTOF10: 0
PAINLEVEL_OUTOF10: 7
PAINLEVEL_OUTOF10: 4
PAINLEVEL_OUTOF10: 8
PAINLEVEL_OUTOF10: 9
PAINLEVEL_OUTOF10: 0
PAINLEVEL_OUTOF10: 4

## 2018-08-20 ASSESSMENT — PAIN DESCRIPTION - PAIN TYPE: TYPE: ACUTE PAIN

## 2018-08-20 ASSESSMENT — PAIN DESCRIPTION - LOCATION
LOCATION: HEAD
LOCATION: HEAD

## 2018-08-20 ASSESSMENT — PAIN DESCRIPTION - DESCRIPTORS: DESCRIPTORS: ACHING

## 2018-08-20 ASSESSMENT — PAIN DESCRIPTION - FREQUENCY: FREQUENCY: CONTINUOUS

## 2018-08-20 NOTE — PROGRESS NOTES
Physical Therapy Rehab Treatment Note  Facility/Department: [facility]  Room: R241/R241-01       NAME: Sandra Kruger  : 1940 (69 y.o.)  MRN: [id number]  CODE STATUS: Full Code    Date of Service: 2018    Chart Reviewed: Yes  Family / Caregiver Present: No  General Comment  Comments: Pt presenting to therapy gym. Restrictions:  Restrictions/Precautions: Fall Risk     SUBJECTIVE: Subjective: \"I just feel terrible. It's the worst I've felt since being here. \"     Pre Treatment Pain Screening  Pain at present: 8  Scale Used: Numeric Score  Intervention List: Patient able to continue with treatment;Nurse called to administer meds  Comments / Details: headache    Post Treatment Pain Screening:  Pain Assessment  Pain Assessment:  (unchanged)    OBJECTIVE:    Bed mobility  Rolling to Left: Independent  Rolling to Right: Independent  Supine to Sit: Independent  Sit to Supine: Independent    Transfers  Sit to Stand: Modified independent  Stand to sit: Modified independent  Bed to Chair: Modified independent  Comment: Increased time to complete. Pt with usage of armrests for stability    Ambulation 1  Device: No Device  Assistance: Modified Independent  Quality of Gait: Rest breaks provided. Pt demonstrating independent mobility, however guarding throughout neck and shoulders manifesting as diminished rotational components. Distance: 200ft X 5   Stairs  # Steps : 12  Stairs Height: 6\"  Rails: Right ascending  Assistance: Modified independent   Comment: reciprocal pattern; consistent    Activity Tolerance  Activity Tolerance: Patient limited by fatigue  Activity Tolerance: increased symptoms following am therapies    Other Activities  Comment: Pt with report of dizziness in busy areas and during occulomotor screen, especially during VOR exercises. Pt educated at length on mental rest and avoiding stimulating environments such as bright lights, screen time and having wife bring in sunglasses.

## 2018-08-20 NOTE — PROGRESS NOTES
Physical Therapy  Facility/Department: [facility] MED SURG [id number]/[id number]  Physical Therapy Discharge      NAME: [place] Octave Day    : 1940 (69 y.o.)  MRN: [id number]    Account: [de-identified]  Gender: male      Patient has been discharged from acute care hospital. DC patient from current PT program.      Electronically signed by Shruthi Franks PT on 18 at 5:10 PM

## 2018-08-21 LAB
HCT VFR BLD CALC: 41 % (ref 42–52)
HEMOGLOBIN: 14.4 G/DL (ref 14–18)

## 2018-08-21 PROCEDURE — 97116 GAIT TRAINING THERAPY: CPT

## 2018-08-21 PROCEDURE — 97535 SELF CARE MNGMENT TRAINING: CPT

## 2018-08-21 PROCEDURE — 36415 COLL VENOUS BLD VENIPUNCTURE: CPT

## 2018-08-21 PROCEDURE — 6370000000 HC RX 637 (ALT 250 FOR IP): Performed by: INTERNAL MEDICINE

## 2018-08-21 PROCEDURE — 6370000000 HC RX 637 (ALT 250 FOR IP): Performed by: PHYSICAL MEDICINE & REHABILITATION

## 2018-08-21 PROCEDURE — 85018 HEMOGLOBIN: CPT

## 2018-08-21 PROCEDURE — 85014 HEMATOCRIT: CPT

## 2018-08-21 PROCEDURE — 1180000000 HC REHAB R&B

## 2018-08-21 RX ORDER — LISINOPRIL AND HYDROCHLOROTHIAZIDE 20; 12.5 MG/1; MG/1
1 TABLET ORAL DAILY
Status: DISCONTINUED | OUTPATIENT
Start: 2018-08-21 | End: 2018-08-23 | Stop reason: HOSPADM

## 2018-08-21 RX ORDER — HYDRALAZINE HYDROCHLORIDE 25 MG/1
25 TABLET, FILM COATED ORAL 3 TIMES DAILY PRN
Qty: 90 TABLET | Refills: 3 | Status: SHIPPED | OUTPATIENT
Start: 2018-08-21 | End: 2018-09-13 | Stop reason: SDUPTHER

## 2018-08-21 RX ADMIN — HYDRALAZINE HYDROCHLORIDE 25 MG: 25 TABLET, FILM COATED ORAL at 12:46

## 2018-08-21 RX ADMIN — PSYLLIUM HUSK 1 PACKET: 3.4 POWDER ORAL at 09:53

## 2018-08-21 RX ADMIN — SIMVASTATIN 20 MG: 20 TABLET, FILM COATED ORAL at 19:59

## 2018-08-21 RX ADMIN — TRAMADOL HYDROCHLORIDE 50 MG: 50 TABLET, FILM COATED ORAL at 19:59

## 2018-08-21 RX ADMIN — ACETAMINOPHEN 650 MG: 325 TABLET ORAL at 19:59

## 2018-08-21 RX ADMIN — LISINOPRIL AND HYDROCHLOROTHIAZIDE 1 TABLET: 12.5; 2 TABLET ORAL at 14:28

## 2018-08-21 RX ADMIN — HYDRALAZINE HYDROCHLORIDE 25 MG: 25 TABLET, FILM COATED ORAL at 00:38

## 2018-08-21 RX ADMIN — LEVOTHYROXINE SODIUM 137 MCG: 137 TABLET ORAL at 06:19

## 2018-08-21 RX ADMIN — TRAMADOL HYDROCHLORIDE 50 MG: 50 TABLET, FILM COATED ORAL at 02:31

## 2018-08-21 RX ADMIN — ACETAMINOPHEN 650 MG: 325 TABLET ORAL at 06:19

## 2018-08-21 RX ADMIN — HYDRALAZINE HYDROCHLORIDE 25 MG: 25 TABLET, FILM COATED ORAL at 23:32

## 2018-08-21 RX ADMIN — ACETAMINOPHEN 650 MG: 325 TABLET ORAL at 12:46

## 2018-08-21 RX ADMIN — TRAMADOL HYDROCHLORIDE 50 MG: 50 TABLET, FILM COATED ORAL at 12:46

## 2018-08-21 ASSESSMENT — PAIN SCALES - GENERAL
PAINLEVEL_OUTOF10: 4
PAINLEVEL_OUTOF10: 0
PAINLEVEL_OUTOF10: 7
PAINLEVEL_OUTOF10: 8
PAINLEVEL_OUTOF10: 7
PAINLEVEL_OUTOF10: 1
PAINLEVEL_OUTOF10: 5
PAINLEVEL_OUTOF10: 9

## 2018-08-21 NOTE — PROGRESS NOTES
Subjective: The patient complains of moderate  acute pain relieved by rest and exacerbated by activity. I am concerned about patients fatigue. ROS x10: The patient also complains of severely impaired mobility and activities of daily living. Otherwise no new problems with vision, hearing, nose, mouth, throat, dermal, cardiovascular, GI, , pulmonary, musculoskeletal, psychiatric or neurological. See Rehab H&P on Rehab chart dated . Vital signs:  BP (!) 181/80   Pulse 62   Temp 97 °F (36.1 °C) (Oral)   Resp 18   Ht 6' (1.829 m)   Wt 240 lb (108.9 kg)   SpO2 96%   BMI 32.55 kg/m²   I/O:   PO/Intake:  fair PO intake, no problems observed or reported. Bowel/Bladder:  continent, no problems noted. General:  Patient is well developed, adequately nourished, non-obese and     well kempt. HEENT:    PERRLA, hearing intact to loud voice, external inspection of ear     and nose benign. Inspection of lips, tongue and gums benign  Musculoskeletal: No significant change in strength or tone. All joints stable. Inspection and palpation of digits and nails show no clubbing,       cyanosis or inflammatory conditions. Neuro/Psychiatric: Affect: flat but pleasant. Alert and oriented to person, place and     situation. No significant change in deep tendon reflexes or     sensation  Lungs:  CTA-B. Respiration effort is normal at rest.     Heart:   S1 = S2, RRR. No loud murmurs. Abdomen:  Soft, non-tender, no enlargement of liver or spleen. Extremities:  No significant lower extremity edema or tenderness. Skin:   Intact to general survey, no visualized or palpated problems.     Rehabilitation:  Physical therapy: FIMS:  Bed Mobility: Sit to Supine: Independent  Scooting: Independent    Transfers: Sit to Stand: Modified independent  Stand to sit: Modified independent  Bed to Chair: Modified independent  Stand Pivot Transfers: Supervision  Comment: Pt with report of dizziness in busy areas and during occulomotor screen, especially during VOR exercises. Pt educated at length on mental rest and avoiding stimulating environments such as bright lights, screen time and having wife bring in sunglasses. , Ambulation 1  Surface: level tile  Device: No Device  Assistance: Modified Independent  Quality of Gait: Rest breaks provided. Pt demonstrating independent mobility, however guarding throughout neck and shoulders manifesting as diminished rotational components. Distance: 200ft X 5  Comments: mild path deviations noted, reciprocal and demonstrated no LOB, occasional narrow BASIM with turns, Stairs  # Steps : 12  Stairs Height: 6\"  Rails: Right ascending  Device: No Device  Assistance: Modified independent   Comment: reciprocal pattern; consistent    FIMS: Bed, Chair, Wheel Chair: 7 - Patient independently transfers  Walk: 6 - Modified [place]  Walks/operates wheelchair at least 150 feet with an ambulatory device, orthosis or prosthesis OR requires extra amount of time OR there is concern for safety  Distance Walked: 200  Stairs: 6 - Modified [place] Safely goes up and down at least one flight of stairs but requries a side support, handrail, cane, or portable supports, or the activity takes more than a reasonable amount of times, or there are safety considerations,  , Assessment: patient demonstrates fatigue with gaze stabilization exercises however denies worsening symptoms. No change in DGI since last observed.      Occupational therapy: FIMS:  Eatin - Feeds self with adaptive equipment/dentures and/or feeds self with modified diet and/or performs own tube feeding  Groomin - Patient independent with all grooming tasks  Bathin - Able to bathe all 10 areas with device (used grab bar to steady self when washing his feet.)  Dressing-Upper: 7 - Patient independently dresses upper body  Dressing-Lower: 5 - Requires setup/supervision/cues and/or staff applies TEDS/prosthesis/brace visualized portion of the paranasal sinuses, and mastoids are unremarkable. There is again note made of soft tissue swelling hematoma overlying the right frontal, supraorbital, preseptal, and premaxillary regions. ACUTE SUBDURAL HEMATOMA PRESENT ALONG THE LEFT TENTORIUM AND THE INNER HEMISPHERIC [place]. UNCHANGED. OTHER FINDINGS DETAILED ABOVE All CT scans at this facility use dose modulation, iterative reconstruction, and/or weight based dosing when appropriate to reduce radiation dose to as low as reasonably achievable. Ct Head Wo Contrast    Result Date: 8/15/2018  CT HEAD WO CONTRAST CLINICAL HISTORY: Subdural hematoma, follow-up, history of fall. COMPARISON: August 14, 2018 at 1937 hours. TECHNIQUE: Multiple unenhanced serial axial images of the brain from the vertex of the skull to the base of the skull were performed. FINDINGS: The ventricles are dilated. This is compensatory to the surrounding moderate generalized parenchymal volume loss. .  No midline shift. The cisterns are patent. There are white matter and periventricular changes most likely consistent with chronic small vessel disease. Again note is made of a Acute subdural hematoma present along the left tentorium and falx , measuring up to 9 mm in greatest thickness along the tentorium and 3 mm in thickness along the falx. Unchanged. The visualized osseous structures show fractures of the left and right nasal bones. The visualized portion of the paranasal sinuses, and mastoids are unremarkable. Note is made of soft tissue swelling hematoma overlying the right for head, supraorbital region, right preseptal, right premaxillary and perinasal soft tissue swelling. ACUTE SUBDURAL HEMATOMA PRESENT ALONG THE LEFT TENTORIUM AND FALX. UNCHANGED.  OTHER FINDINGS DETAILED ABOVE All CT scans at this facility use dose modulation, iterative reconstruction, and/or weight based dosing when appropriate to reduce radiation dose to as low as reasonably achievable. Ct Head Wo Contrast    Result Date: 8/14/2018  EXAMINATION: CT of the brain without contrast HISTORY: Fall. COMPARISON: TECHNIQUE: Multiple contiguous axial images were obtained of the brain from the skull base through the vertex. Multiplanar reformats were obtained. FINDINGS: Prominence of the sulci and ventricles is consistent with mild generalized parenchymal volume loss. Periventricular hypoattenuation is most consistent with chronic ischemic microangiopathy and patient this age. Remote left basal ganglia lacunar infarct. Gray-white matter differentiation is preserved. Acute subdural hematoma is present along the left tentorium and falx, measuring up to 9 mm in greatest thickness along the tentorium and 3 mm in thickness along the falx. No midline shift at this time. Basal cisterns are patent. No mass effect or midline  shift. The visualized paranasal sinuses and mastoid air cells are clear. Calvarium is intact. Small right frontal subcutaneous hematoma. 1. Acute subdural hematoma is present along the left tentorium and falx, measuring up to 9 mm in greatest thickness along the tentorium and 3 mm in greatest thickness along the falx to the left. No midline shift at this time. Findings discussed with Dr. Tayla Dunlap by Dr. Kanchan Paredes via telephone at approximately 2015 hours on 8/14/2018. 2. Small right frontal scalp hematoma. All CT scans at this facility use dose modulation, iterative reconstruction, and/or weight based dosing when appropriate to reduce radiation dose to as low as reasonably achievable. Ct Facial Bones Wo Contrast    Result Date: 8/15/2018  UNENHANCED CT SCAN OF THE MAXILLOFACIAL REGION. CLINICAL HISTORY: Right-sided facial pain after fall COMPARISON: None TECHNIQUE: Multiple unenhanced serial axial images of the of the maxillofacial region with both sagittal and coronal reconstructions was performed.  FINDINGS: Within the field-of-view the visualized basal brain shows again the acute subdural hematoma present along the left tentorium and falx. Please see CT scan report of the brain August 14, 2018 1937 hours for additional details. Prevertebral soft tissue is unremarkable. Within the field-of-view supraglottic and infraglottic airway are patent. The parotids, submandibular glands, are unremarkable. The visualized paranasal sinuses, frontal, anterior. As ethmoid, sphenoid and maxillary sinuses are well aerated. No significant air-fluid levels, retention cyst and/or polyps. The mastoids are well aerated. . Both globes are intact. No gross post septal findings. There is diffuse right preseptal, infraorbital, supraorbital, right frontal, premaxillary soft tissue swelling and hematoma. There is perinasal soft tissue swelling. There is fractures of both the left and right nasal bones. There is some deviation nasal septum to the right. FRACTURES OF THE LEFT AND RIGHT NASAL BONES WITH ASSOCIATED OVERLYING PERINASAL SOFT TISSUE SWELLING. OTHER FINDINGS AS DETAILED ABOVE and. All CT scans at this facility use dose modulation, iterative reconstruction, and/or weight based dosing when appropriate to reduce radiation dose to as low as reasonably achievable. All    Ct Chest Wo Contrast    Result Date: 8/14/2018  EXAM: CT of the chest without contrast CT of the abdomen and pelvis with contrast History: Trauma. Fall. Technique: Multiple contiguous axial images were obtained of the thorax from the thoracic inlet through the upper abdomen without intravenous contrast. Multiplanar reformats were obtained. Multiple contiguous axial images of the abdomen and pelvis were obtained from the lung bases through the initial tuberosities after intravenous ministration of contrast Comparison: None available Findings: CHEST: Subcutaneous soft tissue edema is present along the left anterior chest wall. No axillary, mediastinal, or hilar lymphadenopathy. 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  No thoracic is enlarged and demonstrates a few internal calcifications. Abdominal aorta is nonaneurysmal and demonstrates scattered mild atherosclerotic calcification. No retroperitoneal or abdominal/pelvic lymphadenopathy. No small bowel obstruction. Colonic diverticulosis without diverticulitis. No overt colonic mass or pericolonic inflammation. Appendix is not definitively visualized. No free fluid or free air. Mild degenerative changes of the lumbar spine. No acute osseous abnormality of the abdomen and pelvis. CHEST: 1. Mild subcutaneous soft tissue edema along the anterior left chest wall. 2. No acute intrathoracic process seen on this noncontrast CT of the thorax. ABDOMEN/PELVIS: 1. No acute intra-abdominal process. 2. Colonic diverticulosis without diverticulitis. 3. Enlargement of the prostate. Additional incidental findings as above. All CT scans at this facility use dose modulation, iterative reconstruction, and/or weight based dosing when appropriate to reduce radiation dose to as low as reasonably achievable. Us Carotid Artery Bilateral    Result Date: 8/16/2018  US CAROTID ARTERY BILATERAL: 8/16/2018 CLINICAL HISTORY:  STROKE . Fall, left subdural hematoma, and history of CVA COMPARISON: 8/12/2017. Grayscale, color and waveform Doppler analysis of the cervical carotid and vertebral arteries was performed. Validated velocity measurements with angiographic measurements, velocity criteria are extrapolated from diameter data as defined by the Society of Radiologist in [address] [place] [address] Radiology 2003; 547;[id number]. FINDINGS: There is mild atherosclerotic plaquing of the carotid bulbs, with mildly elevated velocities throughout the cervical common and internal carotid arteries. Maximum systolic velocity within the right internal and mid common carotid arteries are 100 and 132 cm/s respectively.  Maximum systolic velocity within the left internal and mid common carotid arteries are 161 and 117 cm/s

## 2018-08-21 NOTE — PROGRESS NOTES
Occupational Therapy  Facility/Department: [facility]  Daily Treatment Note  NAME: Stephanie Kruger  : 1940  MRN: [id number]    Date of Service: 2018    Discharge Recommendations:  Continue to assess pending progress       Patient Diagnosis(es): There were no encounter diagnoses. has a past medical history of Acute CVA (cerebrovascular accident) ([address] Hyperlipidemia; Hypothyroidism; and Type II or unspecified type diabetes mellitus without mention of complication, not stated as uncontrolled. has no past surgical history on file. Restrictions  Restrictions/Precautions  Restrictions/Precautions: Fall Risk  Required Braces or Orthoses?: No  Subjective   General  Patient assessed for rehabilitation services?: Yes  Referring Practitioner: Dr Marquis Fuller  Diagnosis: TBI S/P fall    Patient reported no pain at the beginning and end of the session  Orientation     Objective    ADL  Feeding: Independent  Grooming: Independent  UE Bathing: Independent  LE Bathing: Independent  UE Dressing: Independent  LE Dressing: Independent  Toileting: Independent  Standing Balance  Sit to stand: Independent  Stand to sit: Independent  Toilet Transfers  Toilet - Technique: Ambulating  Toilet Transfer: Modified independent  Shower Transfers  Shower Transfers Comments: Patient declined to shower today reporting that he had showered the last 2 days     Transfers  Sit to stand: Independent  Stand to sit: Independent      Patient was willing to deliver the daily trivia sheet to all other patients. Patient independently and without a device ambulated to all occupied rooms on the rehab unit and handed a paper to the patients or placed them on the table in the room. Patient had appropriate interaction with the patients. As patient handed the papers out he stated to each patient \"Something to read\". Patient was noted to be smiling during the task. Patient declined a need to sit for a break during the task.          Assessment Activity Tolerance  Activity Tolerance: Patient Tolerated treatment well  Safety Devices  Safety Devices in place: Yes  Type of devices: All fall risk precautions in place          Plan   Plan  Times per week: 5-7x per week for 15-90 minutes  Times per day: Daily  Plan weeks: 3 days to 1 week  Current Treatment Recommendations: Strengthening, Balance Training, Functional Mobility Training, Endurance Training, Self-Care / ADL  Plan Comment: Plan for patient to discharge to home tomorrow 8/22/18    Goals  Patient Goals   Patient goals : Increase independence in ADL's to return home       Therapy Time   Individual Concurrent Group Co-treatment   Time In 0840         Time Out 0930         Minutes 48             Therapist was late from a meeting so therapy started late. Will attempt make up time later in the day.      FREEDOM Carrillo/L    Electronically signed by FREEDOM Carrillo/L on 8/21/2018 at 2:17 PM

## 2018-08-21 NOTE — PROGRESS NOTES
Physical Therapy Rehab Treatment Note  Facility/Department: [facility]  Room: R241/R241-01       NAME: Jolie Kruger  : 1940 (69 y.o.)  MRN: [id number]  CODE STATUS: Full Code    Date of Service: 2018    Chart Reviewed: Yes  Family / Caregiver Present: No  General Comment  Comments: Pt presenting to therapy gym. Restrictions:  Restrictions/Precautions: Fall Risk     SUBJECTIVE: Subjective: Pt reports improvement in headache this day     Pre Treatment Pain Screening  Comments / Details: denies    Post Treatment Pain Screening:  Pain Assessment  Pain Assessment:  (denies)    OBJECTIVE:      Bed mobility  Bridging: Independent  Rolling to Left: Independent  Rolling to Right: Independent  Supine to Sit: Independent  Sit to Supine: Independent    Transfers  Sit to Stand: Independent  Stand to sit: Independent  Bed to Chair: Independent  Car Transfer: Modified independent  Comment: no concerns. Pt completes without assist or LOB. Multiple trials and surfaces completed     Ambulation 1  Surface: level tile;ramp;carpet  Device: No Device  Assistance: Independent  Quality of Gait: decreased braulio however maintains consistent pace despite stating that he feels that his R LE \"drags\". No significant gait deviations noted. Distance: 500ft   Stairs  # Steps : 12  Stairs Height: 6\"  Rails: Left ascending  Assistance: Modified independent   Comment: reciprocal pattern; consistent    Activity Tolerance  Activity Tolerance: Patient Tolerated treatment well  Activity Tolerance: asymptomatic    Other Activities  Comment: Pt and wife extensively educated on importance of mental rest upon DC, low lighting, limited screen time and slow progression of activity beginning with 2-5min walks daily for first week increasing to 15min walks as tolerated in order to decrease symptoms and prevent exacerbation/headache.  Recommended OP PT for further concussion therapy, R LE strengthening, and administration of personalized home exercise program.      ASSESSMENT:  Assessment: Focus on functional mobility and education for optimal healing s/p head injury. PLAN OF CARE:   Plan Comment: cont per POC  Safety Devices  Type of devices:  All fall risk precautions in place    Short term goals  Short term goal 1: Pt to pt indep with consussion-based HEP  Short term goal 2: MURILLO >48/56 to demonstrate decreased risk for falls  Short term goal 3: DGI >23/24 to demonstrate improved balance while amb  Long term goals  Long term goal 1: indep with bed mobility consistently - MET  Long term goal 2: functional transfers with indep and consistently throughout program - MET  Long term goal 3: amb >500ft with indep on various surfaces and terrain 0 LOB, without device - MET  Long term goal 4: 12 steps with 1 handrail and indep - MET    Therapy Time:   Individual   Time In 1000   Time Out 1030   Minutes 30     Timed Code Treatment Minutes: 30 Minutes (gait 20min; transfers 10min)         Lamonte Luz, PT, 08/21/18 at 11:50 AM

## 2018-08-21 NOTE — PROGRESS NOTES
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 [facility] admit 18. 2018    Post-traumatic subdural hematoma ([facility] [address] 2018    Impaired mobility and activities of daily living 2015    Type 2 diabetes mellitus with hyperglycemia, with long-term current use of insulin ([facility] [address] 2012    Hypothyroidism 2012    Hypertension 2012    Hyperlipidemia 2012       Past Medical History:   Diagnosis Date    Acute CVA (cerebrovascular accident) ([facility] [address] 6/3/2015    Left lacunar in centrum semi-ovale got TPA 5-31-15 Dr India Conroy     Hyperlipidemia     Hypothyroidism     Type II or unspecified type diabetes mellitus without mention of complication, not stated as uncontrolled      History reviewed. No pertinent surgical history.     Indications for Skilled Intervention: Decreased functional mobility , Decreased strength, Decreased endurance, Decreased balance, Decreased safe awareness    GOALS:  Short term goals  Short term goal 1: Pt to pt indep with consussion-based HEP - Pt declined  Short term goal 2: MURILLO >48/56 to demonstrate decreased risk for falls - Pt declined  Short term goal 3: DGI >23/24 to demonstrate improved balance while amb - Pt declined  Long term goals  Long term goal 1: indep with bed mobility consistently - MET  Long term

## 2018-08-22 ENCOUNTER — APPOINTMENT (OUTPATIENT)
Dept: CT IMAGING | Age: 78
DRG: 950 | End: 2018-08-22
Attending: PHYSICAL MEDICINE & REHABILITATION
Payer: MEDICARE

## 2018-08-22 LAB
HCT VFR BLD CALC: 43.4 % (ref 42–52)
HEMOGLOBIN: 14.9 G/DL (ref 14–18)

## 2018-08-22 PROCEDURE — 36415 COLL VENOUS BLD VENIPUNCTURE: CPT

## 2018-08-22 PROCEDURE — 6360000002 HC RX W HCPCS: Performed by: INTERNAL MEDICINE

## 2018-08-22 PROCEDURE — 6370000000 HC RX 637 (ALT 250 FOR IP): Performed by: PHYSICAL MEDICINE & REHABILITATION

## 2018-08-22 PROCEDURE — 85018 HEMOGLOBIN: CPT

## 2018-08-22 PROCEDURE — 6370000000 HC RX 637 (ALT 250 FOR IP): Performed by: INTERNAL MEDICINE

## 2018-08-22 PROCEDURE — 85014 HEMATOCRIT: CPT

## 2018-08-22 PROCEDURE — 1180000000 HC REHAB R&B

## 2018-08-22 PROCEDURE — 70450 CT HEAD/BRAIN W/O DYE: CPT

## 2018-08-22 RX ORDER — AMLODIPINE BESYLATE 5 MG/1
5 TABLET ORAL DAILY
Status: DISCONTINUED | OUTPATIENT
Start: 2018-08-22 | End: 2018-08-23

## 2018-08-22 RX ORDER — HYDRALAZINE HYDROCHLORIDE 20 MG/ML
10 INJECTION INTRAMUSCULAR; INTRAVENOUS ONCE
Status: COMPLETED | OUTPATIENT
Start: 2018-08-22 | End: 2018-08-22

## 2018-08-22 RX ADMIN — SIMVASTATIN 20 MG: 20 TABLET, FILM COATED ORAL at 19:47

## 2018-08-22 RX ADMIN — TRAMADOL HYDROCHLORIDE 50 MG: 50 TABLET, FILM COATED ORAL at 03:14

## 2018-08-22 RX ADMIN — TRAMADOL HYDROCHLORIDE 50 MG: 50 TABLET, FILM COATED ORAL at 09:52

## 2018-08-22 RX ADMIN — TRAMADOL HYDROCHLORIDE 50 MG: 50 TABLET, FILM COATED ORAL at 17:23

## 2018-08-22 RX ADMIN — HYDRALAZINE HYDROCHLORIDE 10 MG: 20 INJECTION INTRAMUSCULAR; INTRAVENOUS at 03:52

## 2018-08-22 RX ADMIN — ANTACID TABLETS 500 MG: 500 TABLET, CHEWABLE ORAL at 23:42

## 2018-08-22 RX ADMIN — LISINOPRIL AND HYDROCHLOROTHIAZIDE 1 TABLET: 12.5; 2 TABLET ORAL at 09:51

## 2018-08-22 RX ADMIN — ACETAMINOPHEN 650 MG: 325 TABLET ORAL at 17:23

## 2018-08-22 RX ADMIN — PSYLLIUM HUSK 1 PACKET: 3.4 POWDER ORAL at 09:50

## 2018-08-22 RX ADMIN — ACETAMINOPHEN 650 MG: 325 TABLET ORAL at 03:15

## 2018-08-22 RX ADMIN — HYDRALAZINE HYDROCHLORIDE 25 MG: 25 TABLET, FILM COATED ORAL at 09:51

## 2018-08-22 RX ADMIN — ONDANSETRON HYDROCHLORIDE 4 MG: 4 TABLET, FILM COATED ORAL at 05:35

## 2018-08-22 RX ADMIN — ACETAMINOPHEN 650 MG: 325 TABLET ORAL at 09:52

## 2018-08-22 RX ADMIN — LEVOTHYROXINE SODIUM 137 MCG: 137 TABLET ORAL at 05:35

## 2018-08-22 RX ADMIN — AMLODIPINE BESYLATE 5 MG: 5 TABLET ORAL at 09:51

## 2018-08-22 ASSESSMENT — ENCOUNTER SYMPTOMS
COUGH: 0
DIARRHEA: 0
NAUSEA: 0
VOMITING: 0
SHORTNESS OF BREATH: 0

## 2018-08-22 ASSESSMENT — PAIN SCALES - GENERAL
PAINLEVEL_OUTOF10: 7
PAINLEVEL_OUTOF10: 5
PAINLEVEL_OUTOF10: 8
PAINLEVEL_OUTOF10: 7

## 2018-08-22 ASSESSMENT — PAIN DESCRIPTION - LOCATION: LOCATION: HEAD;OTHER (COMMENT)

## 2018-08-22 ASSESSMENT — PAIN DESCRIPTION - DESCRIPTORS: DESCRIPTORS: ACHING

## 2018-08-22 NOTE — DISCHARGE SUMMARY
BY MOUTH ONCE DAILY             metFORMIN (GLUCOPHAGE) 1000 MG tablet  TAKE ONE TABLET BY MOUTH TWICE DAILY WITH MEALS             simvastatin (ZOCOR) 20 MG tablet  TAKE ONE TABLET BY MOUTH NIGHTLY                 Disposition:   If discharged to Home, Any [facility] needs that were indicated and/or required as been addressed and set up by Social Work. Condition at discharge: Pt was medically stable at the time of discharge. Activity: activity as tolerated    Total time taken for discharging this patient: 40 minutes. Greater than 70% of time was spent focused exclusively on this patient. Time was taken to review chart, discuss plans with consultants, reconciling medications, discussing plan answering questions with patient.      Signed:  Lizz La  8/22/2018, 6:43 PM

## 2018-08-22 NOTE — CARE COORDINATION
Outpatient order for physical therapy at [facility] complete. Two copies in chart. One to be given to patient on discharge. Requested morning appointment.  Electronically signed by Catina Hampton RN on 8/22/18 at 8:56 AM

## 2018-08-22 NOTE — PROGRESS NOTES
Nir Kruger is a 69 y.o. male patient.     Current Facility-Administered Medications   Medication Dose Route Frequency Provider Last Rate Last Dose    amLODIPine (NORVASC) tablet 5 mg  5 mg Oral Daily Tha Monet MD   5 mg at 08/22/18 0951    lisinopril-hydrochlorothiazide (PRINZIDE;ZESTORETIC) 20-12.5 MG per tablet 1 tablet  1 tablet Oral Daily Tha Monet MD   1 tablet at 08/22/18 6378    bisacodyl (DULCOLAX) suppository 10 mg  10 mg Rectal Daily PRN Masha Clements MD        lactulose (CHRONULAC) 10 GM/15ML solution 20 g  20 g Oral BID PRN Masha Clements MD        hydrALAZINE (APRESOLINE) tablet 25 mg  25 mg Oral TID PRN Masha Clements MD   25 mg at 08/22/18 0951    acetaminophen (TYLENOL) tablet 650 mg  650 mg Oral Q4H PRN Georgette Scullin, DO   650 mg at 08/22/18 0952    magnesium hydroxide (MILK OF MAGNESIA) 400 MG/5ML suspension 30 mL  30 mL Oral Daily PRN Georgette Scullin, DO        psyllium (METAMUCIL) 58.12 % packet 1 packet  1 packet Oral Daily Georgette Scullin, DO   1 packet at 08/22/18 0950    acetaminophen (TYLENOL) tablet 650 mg  650 mg Oral Q4H PRN Sarah Stockton MD        levothyroxine (SYNTHROID) tablet 137 mcg  137 mcg Oral Daily Sarah Stockton MD   137 mcg at 08/22/18 0535    ondansetron [facility]) injection 4 mg  4 mg Intravenous Q6H PRN Sarah Stockton MD        simvastatin (ZOCOR) tablet 20 mg  20 mg Oral Nightly Sarah Stockton MD   20 mg at 08/21/18 1959    traMADol (ULTRAM) tablet 50 mg  50 mg Oral Q6H PRN Sarah Stockton MD   50 mg at 08/22/18 4878    calcium carbonate (TUMS) chewable tablet 500 mg  500 mg Oral TID PRN Tha Monet MD   500 mg at 08/17/18 2117    ondansetron (ZOFRAN) tablet 4 mg  4 mg Oral Q6H PRN Tha Monet MD   4 mg at 08/22/18 0555    melatonin tablet 3 mg  3 mg Oral Nightly PRN Tha Monet MD   3 mg at 08/18/18 2053     No Known Allergies  Principal Problem:    Gait abnormality dt TBI -acute rehab

## 2018-08-22 NOTE — PROGRESS NOTES
low lighting, limited screen time and slow progression of activity beginning with 2-5min walks daily for first week increasing to 15min walks as tolerated in order to decrease symptoms and prevent exacerbation/headache. Recommended OP PT for further concussion therapy, R LE strengthening, and administration of personalized home exercise program. , Ambulation 1  Surface: level tile, ramp, carpet  Device: No Device  Assistance: Independent  Quality of Gait: decreased braulio however maintains consistent pace despite stating that he feels that his R LE \"drags\". No significant gait deviations noted. Distance: 500ft  Comments: mild path deviations noted, reciprocal and demonstrated no LOB, occasional narrow BASIM with turns, Stairs  # Steps : 12  Stairs Height: 6\"  Rails: Left ascending  Device: No Device  Assistance: Modified independent   Comment: reciprocal pattern; consistent    FIMS: Bed, Chair, Wheel Chair: 7 - Patient independently transfers  Walk: 7- Complete [place]  Walks/operates wheelchair at least 150 feet Independently with no assistive device  Distance Walked: 500  Stairs: 6 - Modified [place] Safely goes up and down at least one flight of stairs but requries a side support, handrail, cane, or portable supports, or the activity takes more than a reasonable amount of times, or there are safety considerations,  , Assessment: Focus on functional mobility and education for optimal healing s/p head injury.      Occupational therapy: FIMS:  Eatin - Patient feeds self  Groomin - Patient independent with all grooming tasks  Bathin - Patient independently washes, rinses, & dries all 10  Dressing-Upper: 7 - Patient independently dresses upper body  Dressing-Lower: 7 - Patient independently dresses  Toiletin - Patient independent with all 3 tasks  Toilet Transfer: 7 -Patient independent on and off toilet/BSC  Tub Transfer: 0 - Activity does not occur (sponge bath)  Shower Transfer: 0 - brain from the skull base through the vertex. Multiplanar reformats were obtained. FINDINGS: Prominence of the sulci and ventricles is consistent with mild generalized parenchymal volume loss. Periventricular hypoattenuation is most consistent with chronic ischemic microangiopathy and patient this age. Remote left basal ganglia lacunar infarct. Gray-white matter differentiation is preserved. Acute subdural hematoma is present along the left tentorium and falx, measuring up to 9 mm in greatest thickness along the tentorium and 3 mm in thickness along the falx. No midline shift at this time. Basal cisterns are patent. No mass effect or midline  shift. The visualized paranasal sinuses and mastoid air cells are clear. Calvarium is intact. Small right frontal subcutaneous hematoma. 1. Acute subdural hematoma is present along the left tentorium and falx, measuring up to 9 mm in greatest thickness along the tentorium and 3 mm in greatest thickness along the falx to the left. No midline shift at this time. Findings discussed with Dr. Tiera Vega by Dr. Haris Chadwick via telephone at approximately 2015 hours on 8/14/2018. 2. Small right frontal scalp hematoma. All CT scans at this facility use dose modulation, iterative reconstruction, and/or weight based dosing when appropriate to reduce radiation dose to as low as reasonably achievable. Ct Facial Bones Wo Contrast    Result Date: 8/15/2018  UNENHANCED CT SCAN OF THE MAXILLOFACIAL REGION. CLINICAL HISTORY: Right-sided facial pain after fall COMPARISON: None TECHNIQUE: Multiple unenhanced serial axial images of the of the maxillofacial region with both sagittal and coronal reconstructions was performed. FINDINGS: Within the field-of-view the visualized basal brain shows again the acute subdural hematoma present along the left tentorium and falx. Please see CT scan report of the brain August 14, 2018 1937 hours for additional details. Prevertebral soft tissue is unremarkable. without contrast CT of the abdomen and pelvis with contrast History: Trauma. Fall. Technique: Multiple contiguous axial images were obtained of the thorax from the thoracic inlet through the upper abdomen without intravenous contrast. Multiplanar reformats were obtained. Multiple contiguous axial images of the abdomen and pelvis were obtained from the lung bases through the initial tuberosities after intravenous ministration of contrast Comparison: None available Findings: CHEST: Subcutaneous soft tissue edema is present along the left anterior chest wall. No axillary, mediastinal, or hilar lymphadenopathy. 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 No thoracic aortic aneurysm . Atherosclerotic calcification of the aortic arch. Heart size is within normal limits. No significant pericardial effusion. Coronary artery calcifications are noted. Esophagus is within normal limits. No pulmonary nodule or mass. No consolidation, pleural effusion, or pneumothorax. Minimal bilateral dependent atelectasis. Airways are patent. No bronchiectasis. Visualized upper abdominal viscera appear within normal limits. Mild multilevel degenerative changes of the thoracic spine. No acute osseous abnormality. ABDOMEN/PELVIS: The liver, gallbladder, spleen, stomach, pancreas, and adrenal glands are within normal limits. The kidneys enhance uniformly. No enhancing renal lesion identified. A small area of cortical irregularity with 5 mm calcification is present of the anterior cortex of the inferior pole of the left kidney, likely due to remote insult. No urinary tract calculi or hydronephrosis. Urinary bladder is well distended. A 9 mm posterior lateral bladder diverticulum is noted adjacent to the left ureterovesicular junction. The prostate is enlarged and demonstrates a few internal calcifications. Abdominal aorta is nonaneurysmal and demonstrates scattered mild atherosclerotic calcification.  No retroperitoneal or abdominal/pelvic prn Ambien, monitor for day time sedation. 7. Falls risk elevated:  patient to use call light to get nursing assistance to get up, bed and chair alarm. 8. Elevated DVT risk: progressive activities in PT, continue prophylaxis KELSY hose, elevation and   .  9. Complex discharge planning:  Weekly team meeting every Monday to assess progress towards goals, discuss and address social, psychological and medical comorbidities and to address difficulties they may be having progressing in therapy. Patient and family education is in progress. The patient is to follow-up with their family physician after discharge. Complex Active General Medical Issues that complicate care Assess & Plan:    Patient Active Problem List   Diagnosis    Type 2 diabetes mellitus with hyperglycemia, with long-term current use of insulin ([facility] [address]    Hypothyroidism    Hypertension    Hyperlipidemia    Impaired mobility and activities of daily living    Post-traumatic subdural hematoma ([facility])    Late effects of CVA (cerebrovascular accident)    Gait abnormality dt TBI -acute rehab admission 8/17/18    Closed fracture of nasal bone with routine healing    High risk medication use-Ultram    Abnormality of 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 [facility] admit 08/17/18.     Alcohol use     Tripp Berry MD covering             Ehsan Pressley D.O., PM&R     Attending    [facility] Jami Rodriguez

## 2018-08-22 NOTE — FLOWSHEET NOTE
Pt resting in bed, denies needs, bed alarm active, d/c order in for tomorrow. Pt given PRN hydralazine for high BP this evening. Pt refusing nose splint.

## 2018-08-23 VITALS
DIASTOLIC BLOOD PRESSURE: 77 MMHG | SYSTOLIC BLOOD PRESSURE: 146 MMHG | HEIGHT: 72 IN | HEART RATE: 65 BPM | TEMPERATURE: 97 F | WEIGHT: 240 LBS | BODY MASS INDEX: 32.51 KG/M2 | RESPIRATION RATE: 16 BRPM | OXYGEN SATURATION: 94 %

## 2018-08-23 LAB
HCT VFR BLD CALC: 42.1 % (ref 42–52)
HEMOGLOBIN: 14.7 G/DL (ref 14–18)

## 2018-08-23 PROCEDURE — 85018 HEMOGLOBIN: CPT

## 2018-08-23 PROCEDURE — 85014 HEMATOCRIT: CPT

## 2018-08-23 PROCEDURE — 6370000000 HC RX 637 (ALT 250 FOR IP): Performed by: PHYSICAL MEDICINE & REHABILITATION

## 2018-08-23 PROCEDURE — 6370000000 HC RX 637 (ALT 250 FOR IP): Performed by: INTERNAL MEDICINE

## 2018-08-23 PROCEDURE — 36415 COLL VENOUS BLD VENIPUNCTURE: CPT

## 2018-08-23 RX ORDER — AMLODIPINE BESYLATE 5 MG/1
5 TABLET ORAL 2 TIMES DAILY
Qty: 30 TABLET | Refills: 3 | Status: ON HOLD | OUTPATIENT
Start: 2018-08-23 | End: 2019-01-24

## 2018-08-23 RX ORDER — AMLODIPINE BESYLATE 5 MG/1
5 TABLET ORAL 2 TIMES DAILY
Status: DISCONTINUED | OUTPATIENT
Start: 2018-08-23 | End: 2018-08-23 | Stop reason: HOSPADM

## 2018-08-23 RX ADMIN — ACETAMINOPHEN 650 MG: 325 TABLET ORAL at 08:22

## 2018-08-23 RX ADMIN — TRAMADOL HYDROCHLORIDE 50 MG: 50 TABLET, FILM COATED ORAL at 01:44

## 2018-08-23 RX ADMIN — LEVOTHYROXINE SODIUM 137 MCG: 137 TABLET ORAL at 05:23

## 2018-08-23 RX ADMIN — HYDRALAZINE HYDROCHLORIDE 25 MG: 25 TABLET, FILM COATED ORAL at 08:21

## 2018-08-23 RX ADMIN — LISINOPRIL AND HYDROCHLOROTHIAZIDE 1 TABLET: 12.5; 2 TABLET ORAL at 08:21

## 2018-08-23 RX ADMIN — PSYLLIUM HUSK 1 PACKET: 3.4 POWDER ORAL at 08:20

## 2018-08-23 RX ADMIN — ACETAMINOPHEN 650 MG: 325 TABLET ORAL at 01:44

## 2018-08-23 RX ADMIN — AMLODIPINE BESYLATE 5 MG: 5 TABLET ORAL at 08:21

## 2018-08-23 RX ADMIN — HYDRALAZINE HYDROCHLORIDE 25 MG: 25 TABLET, FILM COATED ORAL at 12:22

## 2018-08-23 RX ADMIN — TRAMADOL HYDROCHLORIDE 50 MG: 50 TABLET, FILM COATED ORAL at 08:21

## 2018-08-23 ASSESSMENT — PAIN SCALES - GENERAL
PAINLEVEL_OUTOF10: 5
PAINLEVEL_OUTOF10: 8
PAINLEVEL_OUTOF10: 0

## 2018-08-23 NOTE — PROGRESS NOTES
on importance of mental rest upon DC, low lighting, limited screen time and slow progression of activity beginning with 2-5min walks daily for first week increasing to 15min walks as tolerated in order to decrease symptoms and prevent exacerbation/headache. Recommended OP PT for further concussion therapy, R LE strengthening, and administration of personalized home exercise program. , Ambulation 1  Surface: level tile, ramp, carpet  Device: No Device  Assistance: Independent  Quality of Gait: decreased braulio however maintains consistent pace despite stating that he feels that his R LE \"drags\". No significant gait deviations noted. Distance: 500ft  Comments: mild path deviations noted, reciprocal and demonstrated no LOB, occasional narrow BASIM with turns, Stairs  # Steps : 12  Stairs Height: 6\"  Rails: Left ascending  Device: No Device  Assistance: Modified independent   Comment: reciprocal pattern; consistent    FIMS: Bed, Chair, Wheel Chair: 7 - Patient independently transfers  Walk: 7- Complete [place]  Walks/operates wheelchair at least 150 feet Independently with no assistive device  Distance Walked: 500  Stairs: 6 - Modified [place] Safely goes up and down at least one flight of stairs but requries a side support, handrail, cane, or portable supports, or the activity takes more than a reasonable amount of times, or there are safety considerations,  , Assessment: Focus on functional mobility and education for optimal healing s/p head injury.      Occupational therapy: FIMS:  Eatin - Patient feeds self  Groomin - Patient independent with all grooming tasks  Bathing:  (only washed face and hands)  Dressing-Upper: 0 - Did not occur (no change,not feeling well)  Dressing-Lower: 0 - Did not occur (no change,not feeling well)  Toileting:  (stood to void)  Toilet Transfer: 0 - Did not occur (stood to void)  Tub Transfer: 0 - Activity does not occur (sponge bath)  Shower Transfer: 0 - Activity does obtained of the brain from the skull base through the vertex. Multiplanar reformats were obtained. FINDINGS: Prominence of the sulci and ventricles is consistent with mild generalized parenchymal volume loss. Periventricular hypoattenuation is most consistent with chronic ischemic microangiopathy and patient this age. Remote left basal ganglia lacunar infarct. Gray-white matter differentiation is preserved. Acute subdural hematoma is present along the left tentorium and falx, measuring up to 9 mm in greatest thickness along the tentorium and 3 mm in thickness along the falx. No midline shift at this time. Basal cisterns are patent. No mass effect or midline  shift. The visualized paranasal sinuses and mastoid air cells are clear. Calvarium is intact. Small right frontal subcutaneous hematoma. 1. Acute subdural hematoma is present along the left tentorium and falx, measuring up to 9 mm in greatest thickness along the tentorium and 3 mm in greatest thickness along the falx to the left. No midline shift at this time. Findings discussed with Dr. BETHESDA NORTH by Dr. Wayne Olivo via telephone at approximately 2015 hours on 8/14/2018. 2. Small right frontal scalp hematoma. All CT scans at this facility use dose modulation, iterative reconstruction, and/or weight based dosing when appropriate to reduce radiation dose to as low as reasonably achievable. Ct Facial Bones Wo Contrast    Result Date: 8/15/2018  UNENHANCED CT SCAN OF THE MAXILLOFACIAL REGION. CLINICAL HISTORY: Right-sided facial pain after fall COMPARISON: None TECHNIQUE: Multiple unenhanced serial axial images of the of the maxillofacial region with both sagittal and coronal reconstructions was performed. FINDINGS: Within the field-of-view the visualized basal brain shows again the acute subdural hematoma present along the left tentorium and falx. Please see CT scan report of the brain August 14, 2018 1937 hours for additional details.  Prevertebral soft tissue is unremarkable. Within the field-of-view supraglottic and infraglottic airway are patent. The parotids, submandibular glands, are unremarkable. The visualized paranasal sinuses, frontal, anterior. As ethmoid, sphenoid and maxillary sinuses are well aerated. No significant air-fluid levels, retention cyst and/or polyps. The mastoids are well aerated. . Both globes are intact. No gross post septal findings. There is diffuse right preseptal, infraorbital, supraorbital, right frontal, premaxillary soft tissue swelling and hematoma. There is perinasal soft tissue swelling. There is fractures of both the left and right nasal bones. There is some deviation nasal septum to the right. FRACTURES OF THE LEFT AND RIGHT NASAL BONES WITH ASSOCIATED OVERLYING PERINASAL SOFT TISSUE SWELLING. OTHER FINDINGS AS DETAILED ABOVE and. All CT scans at this facility use dose modulation, iterative reconstruction, and/or weight based dosing when appropriate to reduce radiation dose to as low as reasonably achievable. All    Ct Chest Wo Contrast    Result Date: 8/14/2018  EXAM: CT of the chest without contrast CT of the abdomen and pelvis with contrast History: Trauma. Fall. Technique: Multiple contiguous axial images were obtained of the thorax from the thoracic inlet through the upper abdomen without intravenous contrast. Multiplanar reformats were obtained. Multiple contiguous axial images of the abdomen and pelvis were obtained from the lung bases through the initial tuberosities after intravenous ministration of contrast Comparison: None available Findings: CHEST: Subcutaneous soft tissue edema is present along the left anterior chest wall. No axillary, mediastinal, or hilar lymphadenopathy. 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 No thoracic aortic aneurysm . Atherosclerotic calcification of the aortic arch. Heart size is within normal limits. No significant pericardial effusion. Coronary artery calcifications are noted.  Esophagus is right and left external carotid arteries are 168 and 316 cm/sec respectively. There is antegrade flow in both vertebral arteries. MILD ATHEROSCLEROTIC PLAQUING OF THE CAROTID BULBS. ELEVATED VELOCITIES THROUGHOUT, WITHOUT EVIDENCE OF A FLOW-LIMITING STENOSIS BY VELOCITY RATIO CRITERIA. Previous extensive, complex labs, notes and diagnostics reviewed and analyzed. ALLERGIES:    Allergies as of 08/17/2018    (No Known Allergies)      (please also verify by checking [facility])     Complex Physical Medicine & Rehab Issues Assess & Plan:   1. Severe abnormality of gait and mobility and impaired self-care and ADL's secondary to progressive SDH . Functional and medical status reassessed regarding patients ability to participate in therapies and patient found to be able to participate in acute intensive comprehensive inpatient rehabilitation program including PT/OT to improve balance, ambulation, ADLs, and to improve the P/AROM. Therapeutic modifications regarding activities in therapies, place, amount of time per day and intensity of therapy made daily. In bed therapies or bedside therapies prn.   2. Bowel and Bladder dysfunction:  frequent toileting, ambulate to bathroom with assistance, check post void residuals. Check for C.difficile x1 if >2 loose stools in 24 hours, continue bowel & bladder program.  Monitor bowel and bladder function. Lactinex 2 PO every AC. MOM prn, Brown Bomb prn, Glycerin suppository prn, enema prn. 3. Moderate generalized OA pain: reassess pain every shift and prior to and after each therapy session, give prn Tylenol and  , modalities prn in therapy, Lidoderm, K-pad prn.   4. Skin healing and breakdown risk:  continue pressure relief program.  Daily skin exams and reports from nursing.   5. Fatigue due to nutritional and hydration deficiency:  continue to monitor I&Os, calorie counts prn, dietary consult prn.  6. Acute episodic insomnia with situational adjustment disorder:

## 2018-08-23 NOTE — PROGRESS NOTES
Patient discharged to home accompanied by wife. [facility]. Patient offers no complaints and appears to be in no distress.   Electronically signed by Senait Menon RN on 8/23/2018 at 1:12 PM

## 2018-08-23 NOTE — PROGRESS NOTES
Neurology Follow up    SUBJECTIVE:  NO Headache, double vision,blurry vision,difficulty with speech,difficulty with swallowing,weakness,numbness,pain,nausea,vomitting,chocking,neck pain,dizziness,    PHYSICAL EXAM:    Madan Zambrano MD, Selvin Ivory, [address] [place] [address] Board of Psychiatry & Neurology  Board Certified in Vascular Neurology  Board Certified in Neuromuscular Medicine  Certified in [address] Appearance:      Mental Status Exam:             Level of Alertness:   awake            Orientation:   person, place, time                      Attention/Concentration:  normal            Language:  normal      Funduscopic Exam:     Cranial Nerves        Cranial nerve II           Visual acuity:  normal           Visual fields:  normal      Cranial nerve III           Pupils:  equal, round, reactive to light      Cranial nerves III, IV, VI           Extraocular Movements: intact      Cranial nerve V           Facial sensation:  intact      Cranial nerve VII           Facial strength: intact      Cranial nerve VIII           Hearing:  intact      Cranial nerve IX           Palate:  intact      Cranial nerve XI         Shoulder shrug:  intact      Cranial nerve XII          Tongue movement:  normal    Motor:    Drift:  absent  Motor exam is symmetrical 4+ out of 5 all extremities bilaterally  Tone:  normal  Abnormal Movements:  absent            Sensory:        Pinprick             Right Upper Extremity:  normal             Left Upper Extremity:  normal             Right Lower Extremity:  normal             Left Lower Extremity:  normal           Vibration                         Touch            Proprioception                 Coordination:           Finger/Nose   Right:  normal              Left:  normal          Heel-Knee-Shin                Right:  normal              Left:  normal          Rapid Alternating Movements              Right:  normal              Left:  normal          Gait: Casual:  Mild ataxia                       Romberg:  normal            Reflexes:             Deep Tendon Reflexes:             Reflexes are 2 +             Plantar response:                Right:  downgoing               Left:  downgoing    Vascular:  Cardiac Exam:  normal         Ct Head Wo Contrast    Result Date: 8/15/2018  CT HEAD WO CONTRAST CLINICAL HISTORY:  subdural hematoma , follow-up morning COMPARISON: August 14, 2018 1937 hours and August 14, 2018 2357 hours. TECHNIQUE: Multiple unenhanced serial axial images of the brain from the vertex of the skull to the base of the skull were performed. FINDINGS: The ventricles are dilated. This is compensatory to the surrounding moderate generalized parenchymal volume loss. No midline shift. The cisterns are patent. There are white matter and periventricular changes most likely consistent with chronic small vessel disease. Old left basal ganglia lacunar infarcts are again noted. Unchanged Again note is made of a acute subdural hematoma present along the left tentorium and the inner hemispheric fissure/falx. Unchanged. The visualized osseous structures again show fractures of the left and right nasal bones. . The visualized portion of the paranasal sinuses, and mastoids are unremarkable. There is again note made of soft tissue swelling hematoma overlying the right frontal, supraorbital, preseptal, and premaxillary regions. ACUTE SUBDURAL HEMATOMA PRESENT ALONG THE LEFT TENTORIUM AND THE INNER HEMISPHERIC [place]. UNCHANGED. OTHER FINDINGS DETAILED ABOVE All CT scans at this facility use dose modulation, iterative reconstruction, and/or weight based dosing when appropriate to reduce radiation dose to as low as reasonably achievable. Ct Head Wo Contrast    Result Date: 8/15/2018  CT HEAD WO CONTRAST CLINICAL HISTORY: Subdural hematoma, follow-up, history of fall. COMPARISON: August 14, 2018 at 1937 hours.  TECHNIQUE: Multiple unenhanced serial axial images of the brain from the vertex of the skull to the base of the skull were performed. FINDINGS: The ventricles are dilated. This is compensatory to the surrounding moderate generalized parenchymal volume loss. .  No midline shift. The cisterns are patent. There are white matter and periventricular changes most likely consistent with chronic small vessel disease. Again note is made of a Acute subdural hematoma present along the left tentorium and falx , measuring up to 9 mm in greatest thickness along the tentorium and 3 mm in thickness along the falx. Unchanged. The visualized osseous structures show fractures of the left and right nasal bones. The visualized portion of the paranasal sinuses, and mastoids are unremarkable. Note is made of soft tissue swelling hematoma overlying the right for head, supraorbital region, right preseptal, right premaxillary and perinasal soft tissue swelling. ACUTE SUBDURAL HEMATOMA PRESENT ALONG THE LEFT TENTORIUM AND FALX. UNCHANGED. OTHER FINDINGS DETAILED ABOVE All CT scans at this facility use dose modulation, iterative reconstruction, and/or weight based dosing when appropriate to reduce radiation dose to as low as reasonably achievable. Ct Head Wo Contrast    Result Date: 8/14/2018  EXAMINATION: CT of the brain without contrast HISTORY: Fall. COMPARISON: TECHNIQUE: Multiple contiguous axial images were obtained of the brain from the skull base through the vertex. Multiplanar reformats were obtained. FINDINGS: Prominence of the sulci and ventricles is consistent with mild generalized parenchymal volume loss. Periventricular hypoattenuation is most consistent with chronic ischemic microangiopathy and patient this age. Remote left basal ganglia lacunar infarct. Gray-white matter differentiation is preserved.  Acute subdural hematoma is present along the left tentorium and falx, measuring up to 9 mm in greatest thickness along the tentorium and 3 mm in inferior pole of the left kidney, likely due to remote insult. No urinary tract calculi or hydronephrosis. Urinary bladder is well distended. A 9 mm posterior lateral bladder diverticulum is noted adjacent to the left ureterovesicular junction. The prostate is enlarged and demonstrates a few internal calcifications. Abdominal aorta is nonaneurysmal and demonstrates scattered mild atherosclerotic calcification. No retroperitoneal or abdominal/pelvic lymphadenopathy. No small bowel obstruction. Colonic diverticulosis without diverticulitis. No overt colonic mass or pericolonic inflammation. Appendix is not definitively visualized. No free fluid or free air. Mild degenerative changes of the lumbar spine. No acute osseous abnormality of the abdomen and pelvis. CHEST: 1. Mild subcutaneous soft tissue edema along the anterior left chest wall. 2. No acute intrathoracic process seen on this noncontrast CT of the thorax. ABDOMEN/PELVIS: 1. No acute intra-abdominal process. 2. Colonic diverticulosis without diverticulitis. 3. Enlargement of the prostate. Additional incidental findings as above. All CT scans at this facility use dose modulation, iterative reconstruction, and/or weight based dosing when appropriate to reduce radiation dose to as low as reasonably achievable. Ct Abdomen Pelvis W Iv Contrast Additional Contrast? None    Result Date: 8/14/2018  EXAM: CT of the chest without contrast CT of the abdomen and pelvis with contrast History: Trauma. Fall. Technique: Multiple contiguous axial images were obtained of the thorax from the thoracic inlet through the upper abdomen without intravenous contrast. Multiplanar reformats were obtained.  Multiple contiguous axial images of the abdomen and pelvis were obtained from the lung bases through the initial tuberosities after intravenous ministration of contrast Comparison: None available Findings: CHEST: Subcutaneous soft tissue edema is present along the left anterior chest wall. No axillary, mediastinal, or hilar lymphadenopathy. 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 No thoracic aortic aneurysm . Atherosclerotic calcification of the aortic arch. Heart size is within normal limits. No significant pericardial effusion. Coronary artery calcifications are noted. Esophagus is within normal limits. No pulmonary nodule or mass. No consolidation, pleural effusion, or pneumothorax. Minimal bilateral dependent atelectasis. Airways are patent. No bronchiectasis. Visualized upper abdominal viscera appear within normal limits. Mild multilevel degenerative changes of the thoracic spine. No acute osseous abnormality. ABDOMEN/PELVIS: The liver, gallbladder, spleen, stomach, pancreas, and adrenal glands are within normal limits. The kidneys enhance uniformly. No enhancing renal lesion identified. A small area of cortical irregularity with 5 mm calcification is present of the anterior cortex of the inferior pole of the left kidney, likely due to remote insult. No urinary tract calculi or hydronephrosis. Urinary bladder is well distended. A 9 mm posterior lateral bladder diverticulum is noted adjacent to the left ureterovesicular junction. The prostate is enlarged and demonstrates a few internal calcifications. Abdominal aorta is nonaneurysmal and demonstrates scattered mild atherosclerotic calcification. No retroperitoneal or abdominal/pelvic lymphadenopathy. No small bowel obstruction. Colonic diverticulosis without diverticulitis. No overt colonic mass or pericolonic inflammation. Appendix is not definitively visualized. No free fluid or free air. Mild degenerative changes of the lumbar spine. No acute osseous abnormality of the abdomen and pelvis. CHEST: 1. Mild subcutaneous soft tissue edema along the anterior left chest wall. 2. No acute intrathoracic process seen on this noncontrast CT of the thorax. ABDOMEN/PELVIS: 1. No acute intra-abdominal process.  2. Colonic diverticulosis without diverticulitis. 3. Enlargement of the prostate. Additional incidental findings as above. All CT scans at this facility use dose modulation, iterative reconstruction, and/or weight based dosing when appropriate to reduce radiation dose to as low as reasonably achievable. Us Carotid Artery Bilateral    Result Date: 8/16/2018  US CAROTID ARTERY BILATERAL: 8/16/2018 CLINICAL HISTORY:  STROKE . Fall, left subdural hematoma, and history of CVA COMPARISON: 8/12/2017. Grayscale, color and waveform Doppler analysis of the cervical carotid and vertebral arteries was performed. Validated velocity measurements with angiographic measurements, velocity criteria are extrapolated from diameter data as defined by the Society of Radiologist in [address] [place] [address] Drive Radiology 2003; 837;[id number]. FINDINGS: There is mild atherosclerotic plaquing of the carotid bulbs, with mildly elevated velocities throughout the cervical common and internal carotid arteries. Maximum systolic velocity within the right internal and mid common carotid arteries are 100 and 132 cm/s respectively. Maximum systolic velocity within the left internal and mid common carotid arteries are 161 and 117 cm/s respectively. This results in ICA/CCA ratios of approximately 1 on the right, and 1.4 on the left, which indicates less than 50% narrowing by velocity ratio criteria. Maximum velocities within the right and left external carotid arteries are 168 and 316 cm/sec respectively. There is antegrade flow in both vertebral arteries. MILD ATHEROSCLEROTIC PLAQUING OF THE CAROTID BULBS. ELEVATED VELOCITIES THROUGHOUT, WITHOUT EVIDENCE OF A FLOW-LIMITING STENOSIS BY VELOCITY RATIO CRITERIA.        Recent Labs      08/14/18   1645   08/15/18   0738   08/16/18   0114  08/16/18   0839  08/16/18   1546   WBC  13.4*   --   9.6   --    --    --    --    HGB  14.8   < >  13.3*   < >  12.3*  13.1*  13.2*   PLT  240   --   286   --    --    --    --     < > = values in this interval not displayed. Recent Labs      08/14/18   1645  08/14/18   1714  08/15/18   0738   NA  130*   --   136   K  4.5   --   4.0   CL  91*   --   96*   CO2  23   --   25   BUN  14   --   13   CREATININE  0.88  1.0  0.86   GLUCOSE  214*   --   140*     Recent Labs      08/14/18   1645   BILITOT  0.5   ALKPHOS  53   AST  24   ALT  22     Lab Results   Component Value Date    PROTIME 10.6 08/14/2018    INR 1.0 08/14/2018     No results found for: LITHIUM, DILFRTOT, VALPROATE    ASSESSMENT AND PLAN    Left Sub-dural tentorial hematoma  Called stat, with headache , blurry visoin  Elevated BP  Repeat CT decrease hematoma  Mechanical fail  Traumatic SDH  Carotid normal  To follow    Madan Reinoso MD, Anai Gonzalez, American Board of Psychiatry & Neurology  Board Certified in Vascular Neurology  Board Certified in Neuromuscular Medicine  Certified in . [address]

## 2018-08-23 NOTE — PLAN OF CARE
Problem: Falls - Risk of:  Goal: Will remain free from falls  Will remain free from falls   Outcome: Ongoing    Goal: Absence of physical injury  Absence of physical injury   Outcome: Ongoing  Bed alarm is on patient, frequent rounding

## 2018-08-25 NOTE — DISCHARGE SUMMARY
[id number] [facility] Course: The patient was admitted to the Rehabilitation Unit to address ADL and mobility deficits. The patient was enrolled in acute PT, OT program.  Weekly team meetings were held to assess functional progress toward their goals. The patient's medical issues were addressed. The patient progressed in the rehab program and is now ready for discharge. Refer to FIM scores summary report for detailed functional status. Greater than 25 minutes was spent on coordinating patients discharge including follow-up care, medications and patient/family education. No current facility-administered medications on file prior to encounter. Current Outpatient Prescriptions on File Prior to Encounter   Medication Sig Dispense Refill    simvastatin (ZOCOR) 20 MG tablet TAKE ONE TABLET BY MOUTH NIGHTLY 30 tablet 06    metFORMIN (GLUCOPHAGE) 1000 MG tablet TAKE ONE TABLET BY MOUTH TWICE DAILY WITH MEALS 60 tablet 06    levothyroxine (SYNTHROID) 137 MCG tablet TAKE ONE TABLET BY MOUTH ONCE DAILY 30 tablet 06    lisinopril-hydrochlorothiazide (PRINZIDE;ZESTORETIC) 20-12.5 MG per tablet TAKE ONE TABLET BY MOUTH ONCE DAILY 30 tablet 06       Subjective: The patient complains of moderate  acute pain relieved by rest and exacerbated by activity. I am concerned about patients fatigue. ROS x10: The patient also complains of severely impaired mobility and activities of daily living. Otherwise no new problems with vision, hearing, nose, mouth, throat, dermal, cardiovascular, GI, , pulmonary, musculoskeletal, psychiatric or neurological. See Rehab H&P on Rehab chart dated . Vital signs:  BP (!) 146/77   Pulse 65   Temp 97 °F (36.1 °C) (Oral)   Resp 16   Ht 6' (1.829 m)   Wt 240 lb (108.9 kg)   SpO2 94%   BMI 32.55 kg/m²   I/O:   PO/Intake:  fair PO intake, no problems observed or reported. Bowel/Bladder:  continent, no problems noted.   General:  Patient is well developed, adequately nourished, non-obese and     well kempt. HEENT:    PERRLA, hearing intact to loud voice, external inspection of ear     and nose benign. Inspection of lips, tongue and gums benign  Musculoskeletal: No significant change in strength or tone. All joints stable. Inspection and palpation of digits and nails show no clubbing,       cyanosis or inflammatory conditions. Neuro/Psychiatric: Affect: flat but pleasant. Alert and oriented to person, place and     situation. No significant change in deep tendon reflexes or     sensation  Lungs:  CTA-B. Respiration effort is normal at rest.     Heart:   S1 = S2, RRR. No loud murmurs. Abdomen:  Soft, non-tender, no enlargement of liver or spleen. Extremities:  No significant lower extremity edema or tenderness. Skin:   Intact to general survey, no visualized or palpated problems. Rehabilitation:  Physical therapy: FIMS:  Bed Mobility: Sit to Supine: Independent  Scooting: Independent    Transfers: Sit to Stand: Independent  Stand to sit: Independent  Bed to Chair: Independent  Stand Pivot Transfers: Supervision  Comment: Pt and wife extensively educated on importance of mental rest upon DC, low lighting, limited screen time and slow progression of activity beginning with 2-5min walks daily for first week increasing to 15min walks as tolerated in order to decrease symptoms and prevent exacerbation/headache. Recommended OP PT for further concussion therapy, R LE strengthening, and administration of personalized home exercise program. , Ambulation 1  Surface: level tile, ramp, carpet  Device: No Device  Assistance: Independent  Quality of Gait: decreased braulio however maintains consistent pace despite stating that he feels that his R LE \"drags\". No significant gait deviations noted.    Distance: 500ft  Comments: mild path deviations noted, reciprocal and demonstrated no LOB, occasional narrow BASIM with turns, Stairs  # Steps : 12  Stairs Height: subdural hematoma , follow-up morning COMPARISON: August 14, 2018 1937 hours and August 14, 2018 2357 hours. TECHNIQUE: Multiple unenhanced serial axial images of the brain from the vertex of the skull to the base of the skull were performed. FINDINGS: The ventricles are dilated. This is compensatory to the surrounding moderate generalized parenchymal volume loss. No midline shift. The cisterns are patent. There are white matter and periventricular changes most likely consistent with chronic small vessel disease. Old left basal ganglia lacunar infarcts are again noted. Unchanged Again note is made of a acute subdural hematoma present along the left tentorium and the inner hemispheric fissure/falx. Unchanged. The visualized osseous structures again show fractures of the left and right nasal bones. . The visualized portion of the paranasal sinuses, and mastoids are unremarkable. There is again note made of soft tissue swelling hematoma overlying the right frontal, supraorbital, preseptal, and premaxillary regions. ACUTE SUBDURAL HEMATOMA PRESENT ALONG THE LEFT TENTORIUM AND THE INNER HEMISPHERIC [place]. UNCHANGED. OTHER FINDINGS DETAILED ABOVE All CT scans at this facility use dose modulation, iterative reconstruction, and/or weight based dosing when appropriate to reduce radiation dose to as low as reasonably achievable. Ct Head Wo Contrast    Result Date: 8/15/2018  CT HEAD WO CONTRAST CLINICAL HISTORY: Subdural hematoma, follow-up, history of fall. COMPARISON: August 14, 2018 at 1937 hours. TECHNIQUE: Multiple unenhanced serial axial images of the brain from the vertex of the skull to the base of the skull were performed. FINDINGS: The ventricles are dilated. This is compensatory to the surrounding moderate generalized parenchymal volume loss. .  No midline shift. The cisterns are patent.  There are white matter and periventricular changes most likely consistent with chronic small vessel appropriate to reduce radiation dose to as low as reasonably achievable. All    Ct Chest Wo Contrast    Result Date: 8/14/2018  EXAM: CT of the chest without contrast CT of the abdomen and pelvis with contrast History: Trauma. Fall. Technique: Multiple contiguous axial images were obtained of the thorax from the thoracic inlet through the upper abdomen without intravenous contrast. Multiplanar reformats were obtained. Multiple contiguous axial images of the abdomen and pelvis were obtained from the lung bases through the initial tuberosities after intravenous ministration of contrast Comparison: None available Findings: CHEST: Subcutaneous soft tissue edema is present along the left anterior chest wall. No axillary, mediastinal, or hilar lymphadenopathy. 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 No thoracic aortic aneurysm . Atherosclerotic calcification of the aortic arch. Heart size is within normal limits. No significant pericardial effusion. Coronary artery calcifications are noted. Esophagus is within normal limits. No pulmonary nodule or mass. No consolidation, pleural effusion, or pneumothorax. Minimal bilateral dependent atelectasis. Airways are patent. No bronchiectasis. Mild multilevel degenerative changes of the thoracic spine. No acute osseous abnormality. ABDOMEN/PELVIS: The liver, gallbladder, spleen, stomach, pancreas, and adrenal glands are within normal limits. The kidneys enhance uniformly. No enhancing renal lesion identified. A small area of cortical irregularity with 5 mm calcification is present of the anterior cortex of the inferior pole of the left kidney, likely due to remote insult. No urinary tract calculi or hydronephrosis. Urinary bladder is well distended. A 9 mm posterior lateral bladder diverticulum is noted adjacent to the left ureterovesicular junction. The prostate is enlarged and demonstrates a few internal calcifications.  Abdominal aorta is nonaneurysmal and demonstrates scattered mild atherosclerotic calcification. No retroperitoneal or abdominal/pelvic lymphadenopathy. No small bowel obstruction. Colonic diverticulosis without diverticulitis. No overt colonic mass or pericolonic inflammation. Appendix is not definitively visualized. No free fluid or free air. Mild degenerative changes of the lumbar spine. No acute osseous abnormality of the abdomen and pelvis. CHEST: 1. Mild subcutaneous soft tissue edema along the anterior left chest wall. 2. No acute intrathoracic process seen on this noncontrast CT of the thorax. ABDOMEN/PELVIS: 1. No acute intra-abdominal process. 2. Colonic diverticulosis without diverticulitis. 3. Enlargement of the prostate. Additional incidental findings as above. All CT scans at this facility use dose modulation, iterative reconstruction, and/or weight based dosing when appropriate to reduce radiation dose to as low as reasonably achievable. Ct Abdomen Pelvis W Iv Contrast Additional Contrast? None    Result Date: 8/14/2018  EXAM: CT of the chest without contrast CT of the abdomen and pelvis with contrast History: Trauma. Fall. Technique: Multiple contiguous axial images were obtained of the thorax from the thoracic inlet through the upper abdomen without intravenous contrast. Multiplanar reformats were obtained. Multiple contiguous axial images of the abdomen and pelvis were obtained from the lung bases through the initial tuberosities after intravenous ministration of contrast Comparison: None available Findings: CHEST: Subcutaneous soft tissue edema is present along the left anterior chest wall. No axillary, mediastinal, or hilar lymphadenopathy. 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 No thoracic aortic aneurysm . Atherosclerotic calcification of the aortic arch. Heart size is within normal limits. No significant pericardial effusion. Coronary artery calcifications are noted. Esophagus is within normal limits. No pulmonary nodule or mass.  No consolidation, pleural effusion, or pneumothorax. Minimal bilateral dependent atelectasis. Airways are patent. No bronchiectasis. Visualized upper abdominal viscera appear within normal limits. Mild multilevel degenerative changes of the thoracic spine. No acute osseous abnormality. ABDOMEN/PELVIS: The liver, gallbladder, spleen, stomach, pancreas, and adrenal glands are within normal limits. The kidneys enhance uniformly. No enhancing renal lesion identified. A small area of cortical irregularity with 5 mm calcification is present of the anterior cortex of the inferior pole of the left kidney, likely due to remote insult. No urinary tract calculi or hydronephrosis. Urinary bladder is well distended. A 9 mm posterior lateral bladder diverticulum is noted adjacent to the left ureterovesicular junction. The prostate is enlarged and demonstrates a few internal calcifications. Abdominal aorta is nonaneurysmal and demonstrates scattered mild atherosclerotic calcification. No retroperitoneal or abdominal/pelvic lymphadenopathy. No small bowel obstruction. Colonic diverticulosis without diverticulitis. No overt colonic mass or pericolonic inflammation. Appendix is not definitively visualized. No free fluid or free air. Mild degenerative changes of the lumbar spine. No acute osseous abnormality of the abdomen and pelvis. CHEST: 1. Mild subcutaneous soft tissue edema along the anterior left chest wall. 2. No acute intrathoracic process seen on this noncontrast CT of the thorax. ABDOMEN/PELVIS: 1. No acute intra-abdominal process. 2. Colonic diverticulosis without diverticulitis. 3. Enlargement of the prostate. Additional incidental findings as above. All CT scans at this facility use dose modulation, iterative reconstruction, and/or weight based dosing when appropriate to reduce radiation dose to as low as reasonably achievable.      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 [facility] admit 08/17/18.     Alcohol use     Serene Brewer MD covering             Loreto Maurer D.O., PM&R     Attending    [address]

## 2018-09-13 ENCOUNTER — OFFICE VISIT (OUTPATIENT)
Dept: ENDOCRINOLOGY | Age: 78
End: 2018-09-13
Payer: MEDICARE

## 2018-09-13 VITALS
HEIGHT: 72 IN | SYSTOLIC BLOOD PRESSURE: 132 MMHG | WEIGHT: 226 LBS | BODY MASS INDEX: 30.61 KG/M2 | DIASTOLIC BLOOD PRESSURE: 80 MMHG | HEART RATE: 80 BPM

## 2018-09-13 DIAGNOSIS — E11.65 TYPE 2 DIABETES MELLITUS WITH HYPERGLYCEMIA, WITH LONG-TERM CURRENT USE OF INSULIN (HCC): Primary | ICD-10-CM

## 2018-09-13 DIAGNOSIS — E11.65 TYPE 2 DIABETES MELLITUS WITH HYPERGLYCEMIA, WITH LONG-TERM CURRENT USE OF INSULIN (HCC): ICD-10-CM

## 2018-09-13 DIAGNOSIS — Z79.4 TYPE 2 DIABETES MELLITUS WITH HYPERGLYCEMIA, WITH LONG-TERM CURRENT USE OF INSULIN (HCC): Primary | ICD-10-CM

## 2018-09-13 DIAGNOSIS — E78.5 HYPERLIPIDEMIA, UNSPECIFIED HYPERLIPIDEMIA TYPE: ICD-10-CM

## 2018-09-13 DIAGNOSIS — Z79.4 TYPE 2 DIABETES MELLITUS WITH HYPERGLYCEMIA, WITH LONG-TERM CURRENT USE OF INSULIN (HCC): ICD-10-CM

## 2018-09-13 DIAGNOSIS — E03.9 HYPOTHYROIDISM, UNSPECIFIED TYPE: ICD-10-CM

## 2018-09-13 DIAGNOSIS — I10 ESSENTIAL HYPERTENSION: ICD-10-CM

## 2018-09-13 LAB
ANION GAP SERPL CALCULATED.3IONS-SCNC: 14 MEQ/L (ref 7–13)
BUN BLDV-MCNC: 12 MG/DL (ref 8–23)
CALCIUM SERPL-MCNC: 9.4 MG/DL (ref 8.6–10.2)
CHLORIDE BLD-SCNC: 89 MEQ/L (ref 98–107)
CO2: 28 MEQ/L (ref 22–29)
CREAT SERPL-MCNC: 0.88 MG/DL (ref 0.7–1.2)
GFR AFRICAN AMERICAN: >60
GFR NON-AFRICAN AMERICAN: >60
GLUCOSE BLD-MCNC: 279 MG/DL (ref 74–109)
GLUCOSE BLD-MCNC: 368 MG/DL
HBA1C MFR BLD: 8.5 %
POTASSIUM SERPL-SCNC: 4.5 MEQ/L (ref 3.5–5.1)
SODIUM BLD-SCNC: 131 MEQ/L (ref 132–144)
T4 FREE: 1.49 NG/DL (ref 0.93–1.7)
TSH SERPL DL<=0.05 MIU/L-ACNC: 4.42 UIU/ML (ref 0.27–4.2)

## 2018-09-13 PROCEDURE — G8417 CALC BMI ABV UP PARAM F/U: HCPCS | Performed by: INTERNAL MEDICINE

## 2018-09-13 PROCEDURE — G8427 DOCREV CUR MEDS BY ELIG CLIN: HCPCS | Performed by: INTERNAL MEDICINE

## 2018-09-13 PROCEDURE — 99214 OFFICE O/P EST MOD 30 MIN: CPT | Performed by: INTERNAL MEDICINE

## 2018-09-13 PROCEDURE — 1123F ACP DISCUSS/DSCN MKR DOCD: CPT | Performed by: INTERNAL MEDICINE

## 2018-09-13 PROCEDURE — 1036F TOBACCO NON-USER: CPT | Performed by: INTERNAL MEDICINE

## 2018-09-13 PROCEDURE — 83036 HEMOGLOBIN GLYCOSYLATED A1C: CPT | Performed by: INTERNAL MEDICINE

## 2018-09-13 PROCEDURE — 1111F DSCHRG MED/CURRENT MED MERGE: CPT | Performed by: INTERNAL MEDICINE

## 2018-09-13 PROCEDURE — 4040F PNEUMOC VAC/ADMIN/RCVD: CPT | Performed by: INTERNAL MEDICINE

## 2018-09-13 PROCEDURE — G8598 ASA/ANTIPLAT THER USED: HCPCS | Performed by: INTERNAL MEDICINE

## 2018-09-13 PROCEDURE — 1101F PT FALLS ASSESS-DOCD LE1/YR: CPT | Performed by: INTERNAL MEDICINE

## 2018-09-13 PROCEDURE — 82962 GLUCOSE BLOOD TEST: CPT | Performed by: INTERNAL MEDICINE

## 2018-09-13 RX ORDER — SIMVASTATIN 20 MG
TABLET ORAL
Qty: 30 TABLET | Refills: 6 | Status: SHIPPED | OUTPATIENT
Start: 2018-09-13 | End: 2019-01-15 | Stop reason: SDUPTHER

## 2018-09-13 RX ORDER — LEVOTHYROXINE SODIUM 137 UG/1
TABLET ORAL
Qty: 30 TABLET | Refills: 6 | Status: SHIPPED | OUTPATIENT
Start: 2018-09-13 | End: 2018-11-13 | Stop reason: SDUPTHER

## 2018-09-13 RX ORDER — LISINOPRIL AND HYDROCHLOROTHIAZIDE 20; 12.5 MG/1; MG/1
TABLET ORAL
Qty: 30 TABLET | Refills: 6 | Status: SHIPPED | OUTPATIENT
Start: 2018-09-13 | End: 2019-04-09 | Stop reason: SDUPTHER

## 2018-09-13 RX ORDER — GLIMEPIRIDE 2 MG/1
2 TABLET ORAL 2 TIMES DAILY
Qty: 60 TABLET | Refills: 3 | Status: SHIPPED | OUTPATIENT
Start: 2018-09-13 | End: 2019-02-12 | Stop reason: SDUPTHER

## 2018-09-13 RX ORDER — HYDRALAZINE HYDROCHLORIDE 25 MG/1
25 TABLET, FILM COATED ORAL 3 TIMES DAILY PRN
Qty: 90 TABLET | Refills: 3 | Status: ON HOLD | OUTPATIENT
Start: 2018-09-13 | End: 2019-01-24

## 2018-09-20 NOTE — PROGRESS NOTES
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 [facility] admit 08/17/18.  Alcohol use     History reviewed. No pertinent surgical history. No Known Allergies    Current Outpatient Prescriptions:     glimepiride (AMARYL) 2 MG tablet, Take 1 tablet by mouth 2 times daily, Disp: 60 tablet, Rfl: 3    hydrALAZINE (APRESOLINE) 25 MG tablet, Take 1 tablet by mouth 3 times daily as needed (sys above 545 diastolic above 90), Disp: 90 tablet, Rfl: 3    simvastatin (ZOCOR) 20 MG tablet, TAKE ONE TABLET BY MOUTH NIGHTLY, Disp: 30 tablet, Rfl: 06    metFORMIN (GLUCOPHAGE) 1000 MG tablet, TAKE ONE TABLET BY MOUTH TWICE DAILY WITH MEALS, Disp: 60 tablet, Rfl: 06    levothyroxine (SYNTHROID) 137 MCG tablet, TAKE ONE TABLET BY MOUTH ONCE DAILY, Disp: 30 tablet, Rfl: 06    lisinopril-hydrochlorothiazide (PRINZIDE;ZESTORETIC) 20-12.5 MG per tablet, TAKE ONE TABLET BY MOUTH ONCE DAILY, Disp: 30 tablet, Rfl: 06    amLODIPine (NORVASC) 5 MG tablet, Take 1 tablet by mouth 2 times daily, Disp: 30 tablet, Rfl: 3      Review of Systems   Cardiovascular: Negative. Endocrine: Negative. Musculoskeletal: Positive for gait problem. Neurological: Positive for weakness. All other systems reviewed and are negative. Vitals:    09/13/18 1016   BP: 132/80   Site: Left Upper Arm   Position: Sitting   Cuff Size: Large Adult   Pulse: 80   Weight: 226 lb (102.5 kg)   Height: 6' (1.829 m)       Objective:   Physical Exam   Constitutional: He is oriented to person, place, and time. He appears well-developed and well-nourished. HENT:   Head: Normocephalic and atraumatic. Right Ear: External ear normal.   Left Ear: External ear normal.   Eyes: Conjunctivae are normal. Right eye exhibits no discharge. Left eye exhibits no discharge. No scleral icterus. Neck: Neck supple. Cardiovascular: Normal rate and normal heart sounds.     Pulmonary/Chest: Effort normal and breath sounds normal. diastolic above 90)     Dispense:  90 tablet     Refill:  3    simvastatin (ZOCOR) 20 MG tablet     Sig: TAKE ONE TABLET BY MOUTH NIGHTLY     Dispense:  30 tablet     Refill:  06    metFORMIN (GLUCOPHAGE) 1000 MG tablet     Sig: TAKE ONE TABLET BY MOUTH TWICE DAILY WITH MEALS     Dispense:  60 tablet     Refill:  06    levothyroxine (SYNTHROID) 137 MCG tablet     Sig: TAKE ONE TABLET BY MOUTH ONCE DAILY     Dispense:  30 tablet     Refill:  06    lisinopril-hydrochlorothiazide (PRINZIDE;ZESTORETIC) 20-12.5 MG per tablet     Sig: TAKE ONE TABLET BY MOUTH ONCE DAILY     Dispense:  30 tablet     Refill:  06     More than 50 % of 25 min spend in pt education/cousneling review of records and co-ordination of care         Jennifer Daniels MD

## 2018-10-09 ENCOUNTER — OFFICE VISIT (OUTPATIENT)
Dept: INTERNAL MEDICINE CLINIC | Age: 78
End: 2018-10-09
Payer: MEDICARE

## 2018-10-09 VITALS
TEMPERATURE: 97.7 F | SYSTOLIC BLOOD PRESSURE: 120 MMHG | HEIGHT: 72 IN | RESPIRATION RATE: 14 BRPM | OXYGEN SATURATION: 95 % | DIASTOLIC BLOOD PRESSURE: 76 MMHG | HEART RATE: 73 BPM | BODY MASS INDEX: 30.61 KG/M2 | WEIGHT: 226 LBS

## 2018-10-09 DIAGNOSIS — Z23 NEED FOR IMMUNIZATION AGAINST INFLUENZA: ICD-10-CM

## 2018-10-09 DIAGNOSIS — M94.9 DISORDER OF BONE AND ARTICULAR CARTILAGE: ICD-10-CM

## 2018-10-09 DIAGNOSIS — L98.499: ICD-10-CM

## 2018-10-09 DIAGNOSIS — M89.9 DISORDER OF BONE AND ARTICULAR CARTILAGE: ICD-10-CM

## 2018-10-09 DIAGNOSIS — L98.499: Chronic | ICD-10-CM

## 2018-10-09 DIAGNOSIS — Z23 NEED FOR DIPHTHERIA-TETANUS-PERTUSSIS (TDAP) VACCINE: ICD-10-CM

## 2018-10-09 DIAGNOSIS — I10 ESSENTIAL HYPERTENSION: ICD-10-CM

## 2018-10-09 DIAGNOSIS — R60.0 PEDAL EDEMA: ICD-10-CM

## 2018-10-09 DIAGNOSIS — G47.00 INSOMNIA, UNSPECIFIED TYPE: ICD-10-CM

## 2018-10-09 DIAGNOSIS — R42 DIZZINESS OF UNKNOWN CAUSE: Primary | ICD-10-CM

## 2018-10-09 PROCEDURE — G8482 FLU IMMUNIZE ORDER/ADMIN: HCPCS | Performed by: FAMILY MEDICINE

## 2018-10-09 PROCEDURE — 99204 OFFICE O/P NEW MOD 45 MIN: CPT | Performed by: FAMILY MEDICINE

## 2018-10-09 PROCEDURE — G8598 ASA/ANTIPLAT THER USED: HCPCS | Performed by: FAMILY MEDICINE

## 2018-10-09 PROCEDURE — 90662 IIV NO PRSV INCREASED AG IM: CPT | Performed by: FAMILY MEDICINE

## 2018-10-09 PROCEDURE — 1036F TOBACCO NON-USER: CPT | Performed by: FAMILY MEDICINE

## 2018-10-09 PROCEDURE — 90471 IMMUNIZATION ADMIN: CPT | Performed by: FAMILY MEDICINE

## 2018-10-09 PROCEDURE — 90715 TDAP VACCINE 7 YRS/> IM: CPT | Performed by: FAMILY MEDICINE

## 2018-10-09 PROCEDURE — G0008 ADMIN INFLUENZA VIRUS VAC: HCPCS | Performed by: FAMILY MEDICINE

## 2018-10-09 PROCEDURE — 4040F PNEUMOC VAC/ADMIN/RCVD: CPT | Performed by: FAMILY MEDICINE

## 2018-10-09 PROCEDURE — G8417 CALC BMI ABV UP PARAM F/U: HCPCS | Performed by: FAMILY MEDICINE

## 2018-10-09 PROCEDURE — 1101F PT FALLS ASSESS-DOCD LE1/YR: CPT | Performed by: FAMILY MEDICINE

## 2018-10-09 PROCEDURE — 1123F ACP DISCUSS/DSCN MKR DOCD: CPT | Performed by: FAMILY MEDICINE

## 2018-10-09 PROCEDURE — G8427 DOCREV CUR MEDS BY ELIG CLIN: HCPCS | Performed by: FAMILY MEDICINE

## 2018-10-09 ASSESSMENT — PATIENT HEALTH QUESTIONNAIRE - PHQ9
1. LITTLE INTEREST OR PLEASURE IN DOING THINGS: 3
2. FEELING DOWN, DEPRESSED OR HOPELESS: 3
8. MOVING OR SPEAKING SO SLOWLY THAT OTHER PEOPLE COULD HAVE NOTICED. OR THE OPPOSITE, BEING SO FIGETY OR RESTLESS THAT YOU HAVE BEEN MOVING AROUND A LOT MORE THAN USUAL: 0
10. IF YOU CHECKED OFF ANY PROBLEMS, HOW DIFFICULT HAVE THESE PROBLEMS MADE IT FOR YOU TO DO YOUR WORK, TAKE CARE OF THINGS AT HOME, OR GET ALONG WITH OTHER PEOPLE: 1
SUM OF ALL RESPONSES TO PHQ QUESTIONS 1-9: 11
5. POOR APPETITE OR OVEREATING: 1
9. THOUGHTS THAT YOU WOULD BE BETTER OFF DEAD, OR OF HURTING YOURSELF: 1
SUM OF ALL RESPONSES TO PHQ9 QUESTIONS 1 & 2: 6
4. FEELING TIRED OR HAVING LITTLE ENERGY: 1
6. FEELING BAD ABOUT YOURSELF - OR THAT YOU ARE A FAILURE OR HAVE LET YOURSELF OR YOUR FAMILY DOWN: 1
7. TROUBLE CONCENTRATING ON THINGS, SUCH AS READING THE NEWSPAPER OR WATCHING TELEVISION: 0
3. TROUBLE FALLING OR STAYING ASLEEP: 1
SUM OF ALL RESPONSES TO PHQ QUESTIONS 1-9: 11

## 2018-10-09 ASSESSMENT — ENCOUNTER SYMPTOMS
NAUSEA: 0
VOMITING: 0
RECTAL PAIN: 0
BACK PAIN: 0
SHORTNESS OF BREATH: 0
COUGH: 0
TROUBLE SWALLOWING: 0

## 2018-10-09 NOTE — PROGRESS NOTES
Vaccine Information Sheet, \"Influenza - Inactivated\"  given to [employer], or parent/legal guardian of  Evelyne Kruger and verbalized understanding. Patient responses:    Have you ever had a reaction to a flu vaccine? No  Are you able to eat eggs without adverse effects? Yes  Do you have any current illness? No  Have you ever had Guillian [place] Syndrome? No    Flu vaccine given per order. Please see immunization tab.
range of motion. Neck supple. No thyromegaly present. Cardiovascular: Normal rate, regular rhythm and normal heart sounds. No murmur heard. Pulmonary/Chest: Effort normal and breath sounds normal. No respiratory distress. He has no wheezes. He has no rales. He exhibits no tenderness. Abdominal: Soft. Bowel sounds are normal. He exhibits no distension and no mass. There is no tenderness. Musculoskeletal: He exhibits edema. He exhibits no tenderness. Lymphadenopathy:     He has no cervical adenopathy. Neurological: He is alert and oriented to person, place, and time. He has normal reflexes. No cranial nerve deficit. He exhibits normal muscle tone. Skin: Skin is warm and dry. Rash (ecchymoses on the upper extremities) noted. He is not diaphoretic. Non-healing crusted ulcer on the left forearm   Psychiatric: He has a normal mood and affect. His behavior is normal. Judgment and thought content normal.   Nursing note and vitals reviewed. Assessment:       Diagnosis Orders   1. Dizziness of unknown cause     2. Insomnia, unspecified type     3. Skin ulcer of forearm, unspecified ulcer stage [facility])  Ambulatory referral to Dermatology   4. Essential hypertension     5. Pedal edema     6. Disorder of bone and articular cartilage  Vitamin D 25 Hydroxy   7. Need for immunization against influenza  INFLUENZA, HIGH DOSE, 65 YRS +, IM, PF, PREFILL SYR, 0.5ML (FLUZONE HD)   8.  Need for diphtheria-tetanus-pertussis (Tdap) vaccine  Tdap (age 6y and older) IM ([address])         Plan:      Orders Placed This Encounter   Procedures    INFLUENZA, HIGH DOSE, 65 YRS +, IM, PF, PREFILL SYR, 0.5ML (FLUZONE HD)    Tdap (age 6y and older) IM (BOOSTRIX)   Judy Daigle Vitamin D 25 Hydroxy     Standing Status:   Future     Standing Expiration Date:   10/9/2019    Ambulatory referral to Dermatology     Referral Priority:   Elective     Referral Type:   Consult for Advice and Opinion     Referral Reason:   Self Referral-Dermatology

## 2018-10-18 DIAGNOSIS — M89.9 DISORDER OF BONE AND ARTICULAR CARTILAGE: ICD-10-CM

## 2018-10-18 DIAGNOSIS — M94.9 DISORDER OF BONE AND ARTICULAR CARTILAGE: ICD-10-CM

## 2018-10-18 LAB — VITAMIN D 25-HYDROXY: 20.2 NG/ML (ref 30–100)

## 2018-10-23 ENCOUNTER — OFFICE VISIT (OUTPATIENT)
Dept: INTERNAL MEDICINE CLINIC | Age: 78
End: 2018-10-23
Payer: MEDICARE

## 2018-10-23 VITALS
TEMPERATURE: 97.8 F | DIASTOLIC BLOOD PRESSURE: 70 MMHG | WEIGHT: 298 LBS | BODY MASS INDEX: 40.36 KG/M2 | OXYGEN SATURATION: 94 % | SYSTOLIC BLOOD PRESSURE: 136 MMHG | HEIGHT: 72 IN | RESPIRATION RATE: 14 BRPM | HEART RATE: 80 BPM

## 2018-10-23 DIAGNOSIS — E55.9 VITAMIN D INSUFFICIENCY: ICD-10-CM

## 2018-10-23 DIAGNOSIS — R42 DIZZINESS: Primary | ICD-10-CM

## 2018-10-23 DIAGNOSIS — Z23 ENCOUNTER FOR IMMUNIZATION: ICD-10-CM

## 2018-10-23 DIAGNOSIS — I69.90 LATE EFFECTS OF CVA (CEREBROVASCULAR ACCIDENT): ICD-10-CM

## 2018-10-23 DIAGNOSIS — S06.5X0S POST-TRAUMATIC SUBDURAL HEMATOMA, WITHOUT LOSS OF CONSCIOUSNESS, SEQUELA (HCC): ICD-10-CM

## 2018-10-23 PROCEDURE — G8482 FLU IMMUNIZE ORDER/ADMIN: HCPCS | Performed by: FAMILY MEDICINE

## 2018-10-23 PROCEDURE — G8417 CALC BMI ABV UP PARAM F/U: HCPCS | Performed by: FAMILY MEDICINE

## 2018-10-23 PROCEDURE — 1101F PT FALLS ASSESS-DOCD LE1/YR: CPT | Performed by: FAMILY MEDICINE

## 2018-10-23 PROCEDURE — G8598 ASA/ANTIPLAT THER USED: HCPCS | Performed by: FAMILY MEDICINE

## 2018-10-23 PROCEDURE — 99214 OFFICE O/P EST MOD 30 MIN: CPT | Performed by: FAMILY MEDICINE

## 2018-10-23 PROCEDURE — 1036F TOBACCO NON-USER: CPT | Performed by: FAMILY MEDICINE

## 2018-10-23 PROCEDURE — 1123F ACP DISCUSS/DSCN MKR DOCD: CPT | Performed by: FAMILY MEDICINE

## 2018-10-23 PROCEDURE — 90732 PPSV23 VACC 2 YRS+ SUBQ/IM: CPT | Performed by: FAMILY MEDICINE

## 2018-10-23 PROCEDURE — 4040F PNEUMOC VAC/ADMIN/RCVD: CPT | Performed by: FAMILY MEDICINE

## 2018-10-23 PROCEDURE — G8427 DOCREV CUR MEDS BY ELIG CLIN: HCPCS | Performed by: FAMILY MEDICINE

## 2018-10-23 PROCEDURE — G0009 ADMIN PNEUMOCOCCAL VACCINE: HCPCS | Performed by: FAMILY MEDICINE

## 2018-10-23 NOTE — PROGRESS NOTES
Lives With: Spouse    Type of Home: House    Home Layout: Two level (20 with hand rails)    Home Access: Stairs to enter with rails    Entrance Stairs - Number of Steps: 3    Entrance Stairs - Rails: Right    Bathroom Shower/Tub: Walk-in shower    Bathroom Equipment: Shower chair    Home Equipment:  (no DME)    ADL Assistance: Independent    Homemaking Assistance: Independent    Homemaking Responsibilities: Yes (shared)    Ambulation Assistance: Independent    Transfer Assistance: Independent            /70 (Site: Left Upper Arm, Position: Sitting, Cuff Size: Large Adult)   Pulse 80   Temp 97.8 °F (36.6 °C) (Oral)   Resp 14   Ht 6' (1.829 m)   Wt 298 lb (135.2 kg)   SpO2 94%   BMI 40.42 kg/m²        Physical Exam:    General appearance - alert, well appearing, and in no distress  Mental Status - alert, oriented to person, place, and time  Eyes - pupils equal and reactive, extraocular eye movements intact   Ears - bilateral TM's and external ear canals normal   Nose - normal and patent, no erythema, discharge or polyps   Sinuses - Normal paranasal sinuses without tenderness   Throat - mucous membranes moist, pharynx normal without lesions   Neck - supple, no significant adenopathy   Thyroid - thyroid is normal in size without nodules or tenderness    Chest - clear to auscultation, no wheezes, rales or rhonchi, symmetric air entry   Heart - normal rate, regular rhythm, normal S1, S2, no murmurs, rubs, clicks or gallops  Abdomen - soft, nontender, nondistended, no masses or organomegaly   Back exam - full range of motion, no tenderness, palpable spasm or pain on motion   Neurological - alert, oriented, normal speech, no focal findings or movement disorder noted.   Strength and power intact bilaterally   Musculoskeletal - no joint tenderness, deformity or swelling   Extremities - peripheral pulses normal, no pedal edema, no clubbing or cyanosis   Skin - normal coloration and turgor, no rashes, no

## 2018-11-13 ENCOUNTER — OFFICE VISIT (OUTPATIENT)
Dept: INTERNAL MEDICINE CLINIC | Age: 78
End: 2018-11-13
Payer: MEDICARE

## 2018-11-13 VITALS
BODY MASS INDEX: 31.64 KG/M2 | SYSTOLIC BLOOD PRESSURE: 137 MMHG | HEART RATE: 67 BPM | DIASTOLIC BLOOD PRESSURE: 73 MMHG | TEMPERATURE: 97.5 F | OXYGEN SATURATION: 97 % | HEIGHT: 72 IN | WEIGHT: 233.6 LBS

## 2018-11-13 DIAGNOSIS — Z76.0 MEDICATION REFILL: ICD-10-CM

## 2018-11-13 DIAGNOSIS — R05.9 COUGH: ICD-10-CM

## 2018-11-13 DIAGNOSIS — E55.9 VITAMIN D INSUFFICIENCY: ICD-10-CM

## 2018-11-13 DIAGNOSIS — E11.9 TYPE 2 DIABETES MELLITUS WITHOUT COMPLICATION, WITHOUT LONG-TERM CURRENT USE OF INSULIN (HCC): ICD-10-CM

## 2018-11-13 DIAGNOSIS — R60.0 PEDAL EDEMA: ICD-10-CM

## 2018-11-13 DIAGNOSIS — J32.4 CHRONIC PANSINUSITIS: Primary | ICD-10-CM

## 2018-11-13 DIAGNOSIS — H61.22 IMPACTED CERUMEN OF LEFT EAR: ICD-10-CM

## 2018-11-13 PROCEDURE — G8417 CALC BMI ABV UP PARAM F/U: HCPCS | Performed by: FAMILY MEDICINE

## 2018-11-13 PROCEDURE — G8598 ASA/ANTIPLAT THER USED: HCPCS | Performed by: FAMILY MEDICINE

## 2018-11-13 PROCEDURE — 1123F ACP DISCUSS/DSCN MKR DOCD: CPT | Performed by: FAMILY MEDICINE

## 2018-11-13 PROCEDURE — 99214 OFFICE O/P EST MOD 30 MIN: CPT | Performed by: FAMILY MEDICINE

## 2018-11-13 PROCEDURE — 1036F TOBACCO NON-USER: CPT | Performed by: FAMILY MEDICINE

## 2018-11-13 PROCEDURE — G8427 DOCREV CUR MEDS BY ELIG CLIN: HCPCS | Performed by: FAMILY MEDICINE

## 2018-11-13 PROCEDURE — 4040F PNEUMOC VAC/ADMIN/RCVD: CPT | Performed by: FAMILY MEDICINE

## 2018-11-13 PROCEDURE — G8482 FLU IMMUNIZE ORDER/ADMIN: HCPCS | Performed by: FAMILY MEDICINE

## 2018-11-13 PROCEDURE — 1101F PT FALLS ASSESS-DOCD LE1/YR: CPT | Performed by: FAMILY MEDICINE

## 2018-11-13 RX ORDER — B-COMPLEX WITH VITAMIN C
1 TABLET ORAL 2 TIMES DAILY
Qty: 60 TABLET | Refills: 2 | Status: ON HOLD | OUTPATIENT
Start: 2018-11-13 | End: 2019-01-24

## 2018-11-13 RX ORDER — FLUTICASONE PROPIONATE 50 MCG
2 SPRAY, SUSPENSION (ML) NASAL DAILY
Qty: 1 BOTTLE | Refills: 2 | Status: SHIPPED | OUTPATIENT
Start: 2018-11-13 | End: 2022-03-15

## 2018-11-13 RX ORDER — BENZONATATE 100 MG/1
100 CAPSULE ORAL 3 TIMES DAILY PRN
Qty: 90 CAPSULE | Refills: 0 | Status: SHIPPED | OUTPATIENT
Start: 2018-11-13 | End: 2020-02-11 | Stop reason: ALTCHOICE

## 2018-11-13 RX ORDER — OXYMETAZOLINE HYDROCHLORIDE 0.05 G/100ML
2 SPRAY NASAL 2 TIMES DAILY
Qty: 1 BOTTLE | Refills: 0 | Status: SHIPPED | OUTPATIENT
Start: 2018-11-13 | End: 2018-11-16

## 2018-11-13 RX ORDER — LEVOTHYROXINE SODIUM 137 UG/1
TABLET ORAL
Qty: 30 TABLET | Refills: 3 | Status: SHIPPED | OUTPATIENT
Start: 2018-11-13 | End: 2019-03-06 | Stop reason: SDUPTHER

## 2018-11-13 RX ORDER — ERGOCALCIFEROL 1.25 MG/1
CAPSULE ORAL
Qty: 12 CAPSULE | Refills: 0 | Status: ON HOLD | OUTPATIENT
Start: 2018-11-13 | End: 2019-01-24

## 2018-11-14 ASSESSMENT — ENCOUNTER SYMPTOMS
CHEST TIGHTNESS: 0
WHEEZING: 1
ABDOMINAL PAIN: 0
DIARRHEA: 0
SINUS PRESSURE: 1
NAUSEA: 0
VOMITING: 0
STRIDOR: 0
COUGH: 1
SINUS PAIN: 1
TROUBLE SWALLOWING: 0

## 2019-01-16 RX ORDER — CLOPIDOGREL BISULFATE 75 MG/1
TABLET ORAL
Qty: 90 TABLET | Refills: 1 | Status: SHIPPED | OUTPATIENT
Start: 2019-01-16 | End: 2019-02-12 | Stop reason: SDUPTHER

## 2019-01-16 RX ORDER — SIMVASTATIN 20 MG
TABLET ORAL
Qty: 90 TABLET | Refills: 1 | Status: SHIPPED | OUTPATIENT
Start: 2019-01-16 | End: 2019-02-12 | Stop reason: SDUPTHER

## 2019-01-18 ENCOUNTER — HOSPITAL ENCOUNTER (OUTPATIENT)
Dept: PREADMISSION TESTING | Age: 79
Discharge: HOME | End: 2019-01-22
Payer: MEDICARE

## 2019-01-18 VITALS
OXYGEN SATURATION: 98 % | HEART RATE: 77 BPM | WEIGHT: 233.2 LBS | DIASTOLIC BLOOD PRESSURE: 63 MMHG | SYSTOLIC BLOOD PRESSURE: 155 MMHG | TEMPERATURE: 98.1 F | RESPIRATION RATE: 18 BRPM | BODY MASS INDEX: 32.65 KG/M2 | HEIGHT: 71 IN

## 2019-01-18 DIAGNOSIS — J34.3 HYPERTROPHY OF NASAL TURBINATES: ICD-10-CM

## 2019-01-18 DIAGNOSIS — J34.2 DEVIATED NASAL SEPTUM: ICD-10-CM

## 2019-01-18 DIAGNOSIS — G47.33 OBSTRUCTIVE SLEEP APNEA (ADULT) (PEDIATRIC): ICD-10-CM

## 2019-01-18 LAB
ANION GAP SERPL CALCULATED.3IONS-SCNC: 14 MEQ/L (ref 7–13)
BUN BLDV-MCNC: 14 MG/DL (ref 8–23)
CALCIUM SERPL-MCNC: 9.5 MG/DL (ref 8.6–10.2)
CHLORIDE BLD-SCNC: 93 MEQ/L (ref 98–107)
CO2: 25 MEQ/L (ref 22–29)
CREAT SERPL-MCNC: 1.06 MG/DL (ref 0.7–1.2)
GFR AFRICAN AMERICAN: >60
GFR NON-AFRICAN AMERICAN: >60
GLUCOSE BLD-MCNC: 81 MG/DL (ref 74–109)
HBA1C MFR BLD: 6.8 % (ref 4.8–5.9)
HCT VFR BLD CALC: 45.1 % (ref 42–52)
HEMOGLOBIN: 15.5 G/DL (ref 14–18)
MCH RBC QN AUTO: 30.8 PG (ref 27–31.3)
MCHC RBC AUTO-ENTMCNC: 34.5 % (ref 33–37)
MCV RBC AUTO: 89.2 FL (ref 80–100)
PDW BLD-RTO: 14 % (ref 11.5–14.5)
PLATELET # BLD: 259 K/UL (ref 130–400)
POTASSIUM SERPL-SCNC: 4.4 MEQ/L (ref 3.5–5.1)
RBC # BLD: 5.05 M/UL (ref 4.7–6.1)
SODIUM BLD-SCNC: 132 MEQ/L (ref 132–144)
WBC # BLD: 9 K/UL (ref 4.8–10.8)

## 2019-01-18 PROCEDURE — 83036 HEMOGLOBIN GLYCOSYLATED A1C: CPT

## 2019-01-18 PROCEDURE — 85027 COMPLETE CBC AUTOMATED: CPT

## 2019-01-18 PROCEDURE — 80048 BASIC METABOLIC PNL TOTAL CA: CPT

## 2019-01-18 RX ORDER — SODIUM CHLORIDE 0.9 % (FLUSH) 0.9 %
10 SYRINGE (ML) INJECTION PRN
Status: CANCELLED | OUTPATIENT
Start: 2019-01-24

## 2019-01-18 RX ORDER — LIDOCAINE HYDROCHLORIDE 10 MG/ML
1 INJECTION, SOLUTION EPIDURAL; INFILTRATION; INTRACAUDAL; PERINEURAL
Status: CANCELLED | OUTPATIENT
Start: 2019-01-24 | End: 2019-01-24

## 2019-01-18 RX ORDER — SODIUM CHLORIDE, SODIUM LACTATE, POTASSIUM CHLORIDE, CALCIUM CHLORIDE 600; 310; 30; 20 MG/100ML; MG/100ML; MG/100ML; MG/100ML
INJECTION, SOLUTION INTRAVENOUS CONTINUOUS
Status: CANCELLED | OUTPATIENT
Start: 2019-01-24

## 2019-01-18 RX ORDER — OXYMETAZOLINE HYDROCHLORIDE 0.05 G/100ML
2 SPRAY NASAL 2 TIMES DAILY
COMMUNITY
End: 2020-02-11 | Stop reason: SDUPTHER

## 2019-01-18 RX ORDER — CEFAZOLIN SODIUM 2 G/50ML
2 SOLUTION INTRAVENOUS ONCE
Status: CANCELLED | OUTPATIENT
Start: 2019-01-24

## 2019-01-18 RX ORDER — SODIUM CHLORIDE 0.9 % (FLUSH) 0.9 %
10 SYRINGE (ML) INJECTION EVERY 12 HOURS SCHEDULED
Status: CANCELLED | OUTPATIENT
Start: 2019-01-24

## 2019-01-18 ASSESSMENT — ENCOUNTER SYMPTOMS
SORE THROAT: 0
NAUSEA: 0
SINUS PRESSURE: 1
SHORTNESS OF BREATH: 0
EYE PAIN: 0
EYE DISCHARGE: 0
VOMITING: 0
ABDOMINAL PAIN: 0
WHEEZING: 0
CONSTIPATION: 0
COUGH: 1
DIARRHEA: 0
RHINORRHEA: 1
BLOOD IN STOOL: 0
SINUS PAIN: 1

## 2019-01-24 ENCOUNTER — ANESTHESIA (OUTPATIENT)
Dept: OPERATING ROOM | Age: 79
End: 2019-01-24
Payer: MEDICARE

## 2019-01-24 ENCOUNTER — HOSPITAL ENCOUNTER (OUTPATIENT)
Age: 79
Setting detail: OUTPATIENT SURGERY
Discharge: HOME | End: 2019-01-24
Attending: OTOLARYNGOLOGY | Admitting: OTOLARYNGOLOGY
Payer: MEDICARE

## 2019-01-24 ENCOUNTER — ANESTHESIA EVENT (OUTPATIENT)
Dept: OPERATING ROOM | Age: 79
End: 2019-01-24
Payer: MEDICARE

## 2019-01-24 VITALS
RESPIRATION RATE: 16 BRPM | HEART RATE: 85 BPM | OXYGEN SATURATION: 97 % | BODY MASS INDEX: 31.22 KG/M2 | HEIGHT: 71 IN | DIASTOLIC BLOOD PRESSURE: 84 MMHG | TEMPERATURE: 97.2 F | WEIGHT: 223 LBS | SYSTOLIC BLOOD PRESSURE: 171 MMHG

## 2019-01-24 VITALS — DIASTOLIC BLOOD PRESSURE: 86 MMHG | OXYGEN SATURATION: 95 % | SYSTOLIC BLOOD PRESSURE: 149 MMHG | TEMPERATURE: 96.3 F

## 2019-01-24 DIAGNOSIS — J34.3 HYPERTROPHY OF NASAL TURBINATES: ICD-10-CM

## 2019-01-24 DIAGNOSIS — J34.2 DEVIATED NASAL SEPTUM: Primary | ICD-10-CM

## 2019-01-24 LAB
GLUCOSE BLD-MCNC: 155 MG/DL (ref 60–115)
GLUCOSE BLD-MCNC: 166 MG/DL (ref 60–115)
PERFORMED ON: ABNORMAL
PERFORMED ON: ABNORMAL

## 2019-01-24 PROCEDURE — 2720000010 HC SURG SUPPLY STERILE: Performed by: OTOLARYNGOLOGY

## 2019-01-24 PROCEDURE — 6360000002 HC RX W HCPCS: Performed by: NURSE ANESTHETIST, CERTIFIED REGISTERED

## 2019-01-24 PROCEDURE — 7100000001 HC PACU RECOVERY - ADDTL 15 MIN: Performed by: OTOLARYNGOLOGY

## 2019-01-24 PROCEDURE — 3700000000 HC ANESTHESIA ATTENDED CARE: Performed by: OTOLARYNGOLOGY

## 2019-01-24 PROCEDURE — 2580000003 HC RX 258: Performed by: NURSE ANESTHETIST, CERTIFIED REGISTERED

## 2019-01-24 PROCEDURE — 7100000011 HC PHASE II RECOVERY - ADDTL 15 MIN: Performed by: OTOLARYNGOLOGY

## 2019-01-24 PROCEDURE — 7100000010 HC PHASE II RECOVERY - FIRST 15 MIN: Performed by: OTOLARYNGOLOGY

## 2019-01-24 PROCEDURE — 6360000002 HC RX W HCPCS: Performed by: STUDENT IN AN ORGANIZED HEALTH CARE EDUCATION/TRAINING PROGRAM

## 2019-01-24 PROCEDURE — 6360000002 HC RX W HCPCS: Performed by: NURSE PRACTITIONER

## 2019-01-24 PROCEDURE — 3600000004 HC SURGERY LEVEL 4 BASE: Performed by: OTOLARYNGOLOGY

## 2019-01-24 PROCEDURE — 2709999900 HC NON-CHARGEABLE SUPPLY: Performed by: OTOLARYNGOLOGY

## 2019-01-24 PROCEDURE — 88305 TISSUE EXAM BY PATHOLOGIST: CPT

## 2019-01-24 PROCEDURE — 2580000003 HC RX 258: Performed by: NURSE PRACTITIONER

## 2019-01-24 PROCEDURE — 6370000000 HC RX 637 (ALT 250 FOR IP): Performed by: OTOLARYNGOLOGY

## 2019-01-24 PROCEDURE — 3600000014 HC SURGERY LEVEL 4 ADDTL 15MIN: Performed by: OTOLARYNGOLOGY

## 2019-01-24 PROCEDURE — 3700000001 HC ADD 15 MINUTES (ANESTHESIA): Performed by: OTOLARYNGOLOGY

## 2019-01-24 PROCEDURE — 2500000003 HC RX 250 WO HCPCS: Performed by: NURSE ANESTHETIST, CERTIFIED REGISTERED

## 2019-01-24 PROCEDURE — 88311 DECALCIFY TISSUE: CPT

## 2019-01-24 PROCEDURE — 6370000000 HC RX 637 (ALT 250 FOR IP): Performed by: STUDENT IN AN ORGANIZED HEALTH CARE EDUCATION/TRAINING PROGRAM

## 2019-01-24 PROCEDURE — 7100000000 HC PACU RECOVERY - FIRST 15 MIN: Performed by: OTOLARYNGOLOGY

## 2019-01-24 PROCEDURE — 2500000003 HC RX 250 WO HCPCS: Performed by: OTOLARYNGOLOGY

## 2019-01-24 PROCEDURE — 2580000003 HC RX 258: Performed by: OTOLARYNGOLOGY

## 2019-01-24 RX ORDER — LIDOCAINE HYDROCHLORIDE 10 MG/ML
1 INJECTION, SOLUTION EPIDURAL; INFILTRATION; INTRACAUDAL; PERINEURAL
Status: DISCONTINUED | OUTPATIENT
Start: 2019-01-24 | End: 2019-01-24 | Stop reason: HOSPADM

## 2019-01-24 RX ORDER — SODIUM CHLORIDE 0.9 % (FLUSH) 0.9 %
10 SYRINGE (ML) INJECTION PRN
Status: DISCONTINUED | OUTPATIENT
Start: 2019-01-24 | End: 2019-01-24 | Stop reason: HOSPADM

## 2019-01-24 RX ORDER — LABETALOL HYDROCHLORIDE 5 MG/ML
INJECTION, SOLUTION INTRAVENOUS PRN
Status: DISCONTINUED | OUTPATIENT
Start: 2019-01-24 | End: 2019-01-24 | Stop reason: SDUPTHER

## 2019-01-24 RX ORDER — MAGNESIUM HYDROXIDE 1200 MG/15ML
LIQUID ORAL CONTINUOUS PRN
Status: DISCONTINUED | OUTPATIENT
Start: 2019-01-24 | End: 2019-01-24 | Stop reason: HOSPADM

## 2019-01-24 RX ORDER — CEFAZOLIN SODIUM 2 G/50ML
2 SOLUTION INTRAVENOUS ONCE
Status: COMPLETED | OUTPATIENT
Start: 2019-01-24 | End: 2019-01-24

## 2019-01-24 RX ORDER — DEXAMETHASONE SODIUM PHOSPHATE 10 MG/ML
INJECTION INTRAMUSCULAR; INTRAVENOUS PRN
Status: DISCONTINUED | OUTPATIENT
Start: 2019-01-24 | End: 2019-01-24 | Stop reason: SDUPTHER

## 2019-01-24 RX ORDER — ROCURONIUM BROMIDE 10 MG/ML
INJECTION, SOLUTION INTRAVENOUS PRN
Status: DISCONTINUED | OUTPATIENT
Start: 2019-01-24 | End: 2019-01-24 | Stop reason: SDUPTHER

## 2019-01-24 RX ORDER — CEPHALEXIN 250 MG/1
250 CAPSULE ORAL 3 TIMES DAILY
Qty: 30 CAPSULE | Refills: 0 | Status: SHIPPED | OUTPATIENT
Start: 2019-01-24 | End: 2019-02-03

## 2019-01-24 RX ORDER — ONDANSETRON 2 MG/ML
INJECTION INTRAMUSCULAR; INTRAVENOUS PRN
Status: DISCONTINUED | OUTPATIENT
Start: 2019-01-24 | End: 2019-01-24 | Stop reason: SDUPTHER

## 2019-01-24 RX ORDER — PROPOFOL 10 MG/ML
INJECTION, EMULSION INTRAVENOUS PRN
Status: DISCONTINUED | OUTPATIENT
Start: 2019-01-24 | End: 2019-01-24 | Stop reason: SDUPTHER

## 2019-01-24 RX ORDER — MIDAZOLAM HYDROCHLORIDE 1 MG/ML
INJECTION INTRAMUSCULAR; INTRAVENOUS PRN
Status: DISCONTINUED | OUTPATIENT
Start: 2019-01-24 | End: 2019-01-24 | Stop reason: SDUPTHER

## 2019-01-24 RX ORDER — FENTANYL CITRATE 50 UG/ML
INJECTION, SOLUTION INTRAMUSCULAR; INTRAVENOUS PRN
Status: DISCONTINUED | OUTPATIENT
Start: 2019-01-24 | End: 2019-01-24 | Stop reason: SDUPTHER

## 2019-01-24 RX ORDER — DIPHENHYDRAMINE HYDROCHLORIDE 50 MG/ML
12.5 INJECTION INTRAMUSCULAR; INTRAVENOUS
Status: DISCONTINUED | OUTPATIENT
Start: 2019-01-24 | End: 2019-01-24 | Stop reason: HOSPADM

## 2019-01-24 RX ORDER — SODIUM CHLORIDE 0.9 % (FLUSH) 0.9 %
10 SYRINGE (ML) INJECTION EVERY 12 HOURS SCHEDULED
Status: DISCONTINUED | OUTPATIENT
Start: 2019-01-24 | End: 2019-01-24 | Stop reason: HOSPADM

## 2019-01-24 RX ORDER — ACETAMINOPHEN 325 MG/1
650 TABLET ORAL EVERY 4 HOURS PRN
Status: DISCONTINUED | OUTPATIENT
Start: 2019-01-24 | End: 2019-01-24 | Stop reason: HOSPADM

## 2019-01-24 RX ORDER — HYDROCODONE BITARTRATE AND ACETAMINOPHEN 5; 325 MG/1; MG/1
2 TABLET ORAL PRN
Status: COMPLETED | OUTPATIENT
Start: 2019-01-24 | End: 2019-01-24

## 2019-01-24 RX ORDER — ONDANSETRON 2 MG/ML
4 INJECTION INTRAMUSCULAR; INTRAVENOUS EVERY 6 HOURS PRN
Status: DISCONTINUED | OUTPATIENT
Start: 2019-01-24 | End: 2019-01-24 | Stop reason: HOSPADM

## 2019-01-24 RX ORDER — ONDANSETRON 2 MG/ML
4 INJECTION INTRAMUSCULAR; INTRAVENOUS
Status: DISCONTINUED | OUTPATIENT
Start: 2019-01-24 | End: 2019-01-24 | Stop reason: HOSPADM

## 2019-01-24 RX ORDER — HYDROCODONE BITARTRATE AND ACETAMINOPHEN 5; 325 MG/1; MG/1
1 TABLET ORAL PRN
Status: COMPLETED | OUTPATIENT
Start: 2019-01-24 | End: 2019-01-24

## 2019-01-24 RX ORDER — LIDOCAINE HYDROCHLORIDE 20 MG/ML
INJECTION, SOLUTION INFILTRATION; PERINEURAL PRN
Status: DISCONTINUED | OUTPATIENT
Start: 2019-01-24 | End: 2019-01-24 | Stop reason: SDUPTHER

## 2019-01-24 RX ORDER — MIDAZOLAM HYDROCHLORIDE 1 MG/ML
INJECTION INTRAMUSCULAR; INTRAVENOUS
Status: COMPLETED
Start: 2019-01-24 | End: 2019-01-24

## 2019-01-24 RX ORDER — METOCLOPRAMIDE HYDROCHLORIDE 5 MG/ML
10 INJECTION INTRAMUSCULAR; INTRAVENOUS
Status: DISCONTINUED | OUTPATIENT
Start: 2019-01-24 | End: 2019-01-24 | Stop reason: HOSPADM

## 2019-01-24 RX ORDER — OXYCODONE HYDROCHLORIDE AND ACETAMINOPHEN 5; 325 MG/1; MG/1
1 TABLET ORAL ONCE
Status: DISCONTINUED | OUTPATIENT
Start: 2019-01-24 | End: 2019-01-24 | Stop reason: HOSPADM

## 2019-01-24 RX ORDER — LIDOCAINE HYDROCHLORIDE AND EPINEPHRINE 10; 10 MG/ML; UG/ML
INJECTION, SOLUTION INFILTRATION; PERINEURAL PRN
Status: DISCONTINUED | OUTPATIENT
Start: 2019-01-24 | End: 2019-01-24 | Stop reason: HOSPADM

## 2019-01-24 RX ORDER — FENTANYL CITRATE 50 UG/ML
50 INJECTION, SOLUTION INTRAMUSCULAR; INTRAVENOUS EVERY 10 MIN PRN
Status: DISCONTINUED | OUTPATIENT
Start: 2019-01-24 | End: 2019-01-24 | Stop reason: HOSPADM

## 2019-01-24 RX ORDER — SODIUM CHLORIDE, SODIUM LACTATE, POTASSIUM CHLORIDE, CALCIUM CHLORIDE 600; 310; 30; 20 MG/100ML; MG/100ML; MG/100ML; MG/100ML
INJECTION, SOLUTION INTRAVENOUS CONTINUOUS
Status: DISCONTINUED | OUTPATIENT
Start: 2019-01-24 | End: 2019-01-24 | Stop reason: HOSPADM

## 2019-01-24 RX ORDER — SODIUM CHLORIDE, SODIUM LACTATE, POTASSIUM CHLORIDE, CALCIUM CHLORIDE 600; 310; 30; 20 MG/100ML; MG/100ML; MG/100ML; MG/100ML
INJECTION, SOLUTION INTRAVENOUS CONTINUOUS PRN
Status: DISCONTINUED | OUTPATIENT
Start: 2019-01-24 | End: 2019-01-24 | Stop reason: SDUPTHER

## 2019-01-24 RX ORDER — HYDROCODONE BITARTRATE AND ACETAMINOPHEN 5; 325 MG/1; MG/1
1 TABLET ORAL EVERY 4 HOURS PRN
Qty: 20 TABLET | Refills: 0 | Status: SHIPPED | OUTPATIENT
Start: 2019-01-24 | End: 2019-01-31

## 2019-01-24 RX ORDER — MEPERIDINE HYDROCHLORIDE 25 MG/ML
12.5 INJECTION INTRAMUSCULAR; INTRAVENOUS; SUBCUTANEOUS EVERY 5 MIN PRN
Status: DISCONTINUED | OUTPATIENT
Start: 2019-01-24 | End: 2019-01-24 | Stop reason: HOSPADM

## 2019-01-24 RX ORDER — GLYCOPYRROLATE 1 MG/5 ML
SYRINGE (ML) INTRAVENOUS PRN
Status: DISCONTINUED | OUTPATIENT
Start: 2019-01-24 | End: 2019-01-24 | Stop reason: SDUPTHER

## 2019-01-24 RX ADMIN — PROPOFOL 250 MG: 10 INJECTION, EMULSION INTRAVENOUS at 14:35

## 2019-01-24 RX ADMIN — SUGAMMADEX 300 MG: 100 INJECTION, SOLUTION INTRAVENOUS at 15:34

## 2019-01-24 RX ADMIN — SODIUM CHLORIDE, POTASSIUM CHLORIDE, SODIUM LACTATE AND CALCIUM CHLORIDE: 600; 310; 30; 20 INJECTION, SOLUTION INTRAVENOUS at 10:56

## 2019-01-24 RX ADMIN — ONDANSETRON 4 MG: 2 INJECTION INTRAMUSCULAR; INTRAVENOUS at 15:31

## 2019-01-24 RX ADMIN — SODIUM CHLORIDE, POTASSIUM CHLORIDE, SODIUM LACTATE AND CALCIUM CHLORIDE: 600; 310; 30; 20 INJECTION, SOLUTION INTRAVENOUS at 14:57

## 2019-01-24 RX ADMIN — LIDOCAINE HYDROCHLORIDE 80 MG: 20 INJECTION, SOLUTION INFILTRATION; PERINEURAL at 15:39

## 2019-01-24 RX ADMIN — MIDAZOLAM HYDROCHLORIDE 1 MG: 1 INJECTION, SOLUTION INTRAMUSCULAR; INTRAVENOUS at 14:32

## 2019-01-24 RX ADMIN — LABETALOL HYDROCHLORIDE 5 MG: 5 INJECTION, SOLUTION INTRAVENOUS at 15:02

## 2019-01-24 RX ADMIN — FENTANYL CITRATE 50 MCG: 50 INJECTION, SOLUTION INTRAMUSCULAR; INTRAVENOUS at 16:11

## 2019-01-24 RX ADMIN — CEFAZOLIN SODIUM 2 G: 2 SOLUTION INTRAVENOUS at 14:34

## 2019-01-24 RX ADMIN — Medication 0.15 MG: at 14:53

## 2019-01-24 RX ADMIN — SODIUM CHLORIDE, POTASSIUM CHLORIDE, SODIUM LACTATE AND CALCIUM CHLORIDE: 600; 310; 30; 20 INJECTION, SOLUTION INTRAVENOUS at 12:18

## 2019-01-24 RX ADMIN — DEXAMETHASONE SODIUM PHOSPHATE 8 MG: 10 INJECTION INTRAMUSCULAR; INTRAVENOUS at 14:35

## 2019-01-24 RX ADMIN — LIDOCAINE HYDROCHLORIDE 100 MG: 20 INJECTION, SOLUTION INFILTRATION; PERINEURAL at 14:35

## 2019-01-24 RX ADMIN — ROCURONIUM BROMIDE 50 MG: 10 INJECTION INTRAVENOUS at 14:35

## 2019-01-24 RX ADMIN — FENTANYL CITRATE 25 MCG: 50 INJECTION, SOLUTION INTRAMUSCULAR; INTRAVENOUS at 15:02

## 2019-01-24 RX ADMIN — FENTANYL CITRATE 50 MCG: 50 INJECTION, SOLUTION INTRAMUSCULAR; INTRAVENOUS at 14:35

## 2019-01-24 RX ADMIN — MIDAZOLAM HYDROCHLORIDE 1 MG: 1 INJECTION, SOLUTION INTRAMUSCULAR; INTRAVENOUS at 14:30

## 2019-01-24 RX ADMIN — HYDROCODONE BITARTRATE AND ACETAMINOPHEN 1 TABLET: 5; 325 TABLET ORAL at 17:25

## 2019-01-24 RX ADMIN — FENTANYL CITRATE 25 MCG: 50 INJECTION, SOLUTION INTRAMUSCULAR; INTRAVENOUS at 15:41

## 2019-01-24 ASSESSMENT — PULMONARY FUNCTION TESTS
PIF_VALUE: 15
PIF_VALUE: 28
PIF_VALUE: 7
PIF_VALUE: 4
PIF_VALUE: 18
PIF_VALUE: 1
PIF_VALUE: 22
PIF_VALUE: 1
PIF_VALUE: 1
PIF_VALUE: 20
PIF_VALUE: 2
PIF_VALUE: 21
PIF_VALUE: 24
PIF_VALUE: 35
PIF_VALUE: 22
PIF_VALUE: 5
PIF_VALUE: 0
PIF_VALUE: 18
PIF_VALUE: 20
PIF_VALUE: 22
PIF_VALUE: 1
PIF_VALUE: 18
PIF_VALUE: 20
PIF_VALUE: 19
PIF_VALUE: 22
PIF_VALUE: 0
PIF_VALUE: 21
PIF_VALUE: 2
PIF_VALUE: 19
PIF_VALUE: 18
PIF_VALUE: 22
PIF_VALUE: 17
PIF_VALUE: 6
PIF_VALUE: 16
PIF_VALUE: 20
PIF_VALUE: 25
PIF_VALUE: 16
PIF_VALUE: 1
PIF_VALUE: 20
PIF_VALUE: 16
PIF_VALUE: 25
PIF_VALUE: 22
PIF_VALUE: 4
PIF_VALUE: 22
PIF_VALUE: 16
PIF_VALUE: 22
PIF_VALUE: 19
PIF_VALUE: 17
PIF_VALUE: 5
PIF_VALUE: 4
PIF_VALUE: 28
PIF_VALUE: 22
PIF_VALUE: 20
PIF_VALUE: 21
PIF_VALUE: 16
PIF_VALUE: 19
PIF_VALUE: 18
PIF_VALUE: 0
PIF_VALUE: 2
PIF_VALUE: 1
PIF_VALUE: 18
PIF_VALUE: 22
PIF_VALUE: 22
PIF_VALUE: 21
PIF_VALUE: 19
PIF_VALUE: 22
PIF_VALUE: 18
PIF_VALUE: 0
PIF_VALUE: 22
PIF_VALUE: 22
PIF_VALUE: 19
PIF_VALUE: 22
PIF_VALUE: 22
PIF_VALUE: 24
PIF_VALUE: 6
PIF_VALUE: 1
PIF_VALUE: 17
PIF_VALUE: 17
PIF_VALUE: 3
PIF_VALUE: 6
PIF_VALUE: 22
PIF_VALUE: 2
PIF_VALUE: 2
PIF_VALUE: 24
PIF_VALUE: 25
PIF_VALUE: 21
PIF_VALUE: 19
PIF_VALUE: 22

## 2019-01-24 ASSESSMENT — PAIN SCALES - GENERAL
PAINLEVEL_OUTOF10: 7
PAINLEVEL_OUTOF10: 0

## 2019-01-24 ASSESSMENT — PAIN - FUNCTIONAL ASSESSMENT: PAIN_FUNCTIONAL_ASSESSMENT: 0-10

## 2019-02-12 RX ORDER — CLOPIDOGREL BISULFATE 75 MG/1
TABLET ORAL
Qty: 30 TABLET | Refills: 0 | Status: SHIPPED | OUTPATIENT
Start: 2019-02-12 | End: 2020-08-13 | Stop reason: ALTCHOICE

## 2019-02-12 RX ORDER — GLIMEPIRIDE 2 MG/1
TABLET ORAL
Qty: 60 TABLET | Refills: 1 | Status: SHIPPED | OUTPATIENT
Start: 2019-02-12 | End: 2019-04-09 | Stop reason: SDUPTHER

## 2019-02-12 RX ORDER — SIMVASTATIN 20 MG
TABLET ORAL
Qty: 30 TABLET | Refills: 0 | Status: SHIPPED | OUTPATIENT
Start: 2019-02-12 | End: 2019-03-06 | Stop reason: SDUPTHER

## 2019-03-07 RX ORDER — SIMVASTATIN 20 MG
TABLET ORAL
Qty: 30 TABLET | Refills: 3 | Status: SHIPPED | OUTPATIENT
Start: 2019-03-07 | End: 2020-08-13 | Stop reason: ALTCHOICE

## 2019-03-12 DIAGNOSIS — E03.9 HYPOTHYROIDISM, UNSPECIFIED TYPE: ICD-10-CM

## 2019-03-12 DIAGNOSIS — Z79.4 TYPE 2 DIABETES MELLITUS WITH HYPERGLYCEMIA, WITH LONG-TERM CURRENT USE OF INSULIN (HCC): ICD-10-CM

## 2019-03-12 DIAGNOSIS — E11.65 TYPE 2 DIABETES MELLITUS WITH HYPERGLYCEMIA, WITH LONG-TERM CURRENT USE OF INSULIN (HCC): ICD-10-CM

## 2019-03-12 LAB
ALBUMIN SERPL-MCNC: 4 G/DL (ref 3.5–4.6)
ALP BLD-CCNC: 53 U/L (ref 35–104)
ALT SERPL-CCNC: 19 U/L (ref 0–41)
ANION GAP SERPL CALCULATED.3IONS-SCNC: 12 MEQ/L (ref 9–15)
AST SERPL-CCNC: 15 U/L (ref 0–40)
BILIRUB SERPL-MCNC: 0.4 MG/DL (ref 0.2–0.7)
BILIRUBIN DIRECT: <0.2 MG/DL (ref 0–0.4)
BILIRUBIN, INDIRECT: NORMAL MG/DL (ref 0–0.6)
BUN BLDV-MCNC: 16 MG/DL (ref 8–23)
CALCIUM SERPL-MCNC: 8.9 MG/DL (ref 8.5–9.9)
CHLORIDE BLD-SCNC: 100 MEQ/L (ref 95–107)
CHOLESTEROL, TOTAL: 150 MG/DL (ref 0–199)
CO2: 27 MEQ/L (ref 20–31)
CREAT SERPL-MCNC: 0.96 MG/DL (ref 0.7–1.2)
GFR AFRICAN AMERICAN: >60
GFR NON-AFRICAN AMERICAN: >60
GLUCOSE BLD-MCNC: 123 MG/DL (ref 70–99)
HBA1C MFR BLD: 6.6 % (ref 4.8–5.9)
HDLC SERPL-MCNC: 44 MG/DL (ref 40–59)
LDL CHOLESTEROL CALCULATED: 90 MG/DL (ref 0–129)
POTASSIUM SERPL-SCNC: 4.6 MEQ/L (ref 3.4–4.9)
SODIUM BLD-SCNC: 139 MEQ/L (ref 135–144)
T4 FREE: 1.19 NG/DL (ref 0.84–1.68)
TOTAL PROTEIN: 6.9 G/DL (ref 6.3–8)
TRIGL SERPL-MCNC: 82 MG/DL (ref 0–150)
TSH SERPL DL<=0.05 MIU/L-ACNC: 2.88 UIU/ML (ref 0.44–3.86)

## 2019-03-14 ENCOUNTER — OFFICE VISIT (OUTPATIENT)
Dept: ENDOCRINOLOGY | Age: 79
End: 2019-03-14
Payer: MEDICARE

## 2019-03-14 VITALS
BODY MASS INDEX: 32.62 KG/M2 | OXYGEN SATURATION: 94 % | WEIGHT: 233 LBS | SYSTOLIC BLOOD PRESSURE: 135 MMHG | DIASTOLIC BLOOD PRESSURE: 76 MMHG | HEIGHT: 71 IN | HEART RATE: 78 BPM

## 2019-03-14 DIAGNOSIS — E03.9 HYPOTHYROIDISM, UNSPECIFIED TYPE: ICD-10-CM

## 2019-03-14 DIAGNOSIS — E11.65 TYPE 2 DIABETES MELLITUS WITH HYPERGLYCEMIA, WITH LONG-TERM CURRENT USE OF INSULIN (HCC): Primary | ICD-10-CM

## 2019-03-14 DIAGNOSIS — Z79.4 TYPE 2 DIABETES MELLITUS WITH HYPERGLYCEMIA, WITH LONG-TERM CURRENT USE OF INSULIN (HCC): Primary | ICD-10-CM

## 2019-03-14 LAB — GLUCOSE BLD-MCNC: 93 MG/DL

## 2019-03-14 PROCEDURE — 82962 GLUCOSE BLOOD TEST: CPT | Performed by: INTERNAL MEDICINE

## 2019-03-14 PROCEDURE — 1036F TOBACCO NON-USER: CPT | Performed by: INTERNAL MEDICINE

## 2019-03-14 PROCEDURE — G8417 CALC BMI ABV UP PARAM F/U: HCPCS | Performed by: INTERNAL MEDICINE

## 2019-03-14 PROCEDURE — 4040F PNEUMOC VAC/ADMIN/RCVD: CPT | Performed by: INTERNAL MEDICINE

## 2019-03-14 PROCEDURE — 1101F PT FALLS ASSESS-DOCD LE1/YR: CPT | Performed by: INTERNAL MEDICINE

## 2019-03-14 PROCEDURE — G8482 FLU IMMUNIZE ORDER/ADMIN: HCPCS | Performed by: INTERNAL MEDICINE

## 2019-03-14 PROCEDURE — G8427 DOCREV CUR MEDS BY ELIG CLIN: HCPCS | Performed by: INTERNAL MEDICINE

## 2019-03-14 PROCEDURE — 99213 OFFICE O/P EST LOW 20 MIN: CPT | Performed by: INTERNAL MEDICINE

## 2019-03-14 PROCEDURE — 1123F ACP DISCUSS/DSCN MKR DOCD: CPT | Performed by: INTERNAL MEDICINE

## 2019-04-09 DIAGNOSIS — Z76.0 MEDICATION REFILL: ICD-10-CM

## 2019-04-09 DIAGNOSIS — E11.9 TYPE 2 DIABETES MELLITUS WITHOUT COMPLICATION, WITHOUT LONG-TERM CURRENT USE OF INSULIN (HCC): ICD-10-CM

## 2019-04-10 RX ORDER — LISINOPRIL AND HYDROCHLOROTHIAZIDE 20; 12.5 MG/1; MG/1
TABLET ORAL
Qty: 90 TABLET | Refills: 1 | Status: SHIPPED | OUTPATIENT
Start: 2019-04-10 | End: 2019-05-10 | Stop reason: SDUPTHER

## 2019-04-10 RX ORDER — GLIMEPIRIDE 2 MG/1
TABLET ORAL
Qty: 180 TABLET | Refills: 1 | Status: SHIPPED | OUTPATIENT
Start: 2019-04-10 | End: 2019-05-10 | Stop reason: SDUPTHER

## 2019-05-10 DIAGNOSIS — E11.9 TYPE 2 DIABETES MELLITUS WITHOUT COMPLICATION, WITHOUT LONG-TERM CURRENT USE OF INSULIN (HCC): ICD-10-CM

## 2019-05-10 DIAGNOSIS — Z76.0 MEDICATION REFILL: ICD-10-CM

## 2019-05-10 RX ORDER — GLIMEPIRIDE 2 MG/1
TABLET ORAL
Qty: 180 TABLET | Refills: 1 | Status: SHIPPED | OUTPATIENT
Start: 2019-05-10 | End: 2019-09-16 | Stop reason: SDUPTHER

## 2019-05-10 RX ORDER — LISINOPRIL AND HYDROCHLOROTHIAZIDE 20; 12.5 MG/1; MG/1
TABLET ORAL
Qty: 90 TABLET | Refills: 1 | Status: SHIPPED | OUTPATIENT
Start: 2019-05-10 | End: 2019-09-16 | Stop reason: SDUPTHER

## 2019-05-10 RX ORDER — LEVOTHYROXINE SODIUM 137 UG/1
TABLET ORAL
Qty: 90 TABLET | Refills: 1 | Status: SHIPPED | OUTPATIENT
Start: 2019-05-10 | End: 2019-09-16 | Stop reason: SDUPTHER

## 2019-09-13 DIAGNOSIS — Z79.4 TYPE 2 DIABETES MELLITUS WITH HYPERGLYCEMIA, WITH LONG-TERM CURRENT USE OF INSULIN (HCC): ICD-10-CM

## 2019-09-13 DIAGNOSIS — E03.9 HYPOTHYROIDISM, UNSPECIFIED TYPE: ICD-10-CM

## 2019-09-13 DIAGNOSIS — E11.65 TYPE 2 DIABETES MELLITUS WITH HYPERGLYCEMIA, WITH LONG-TERM CURRENT USE OF INSULIN (HCC): ICD-10-CM

## 2019-09-13 LAB
ANION GAP SERPL CALCULATED.3IONS-SCNC: 11 MEQ/L (ref 9–15)
BUN BLDV-MCNC: 15 MG/DL (ref 8–23)
CALCIUM SERPL-MCNC: 9 MG/DL (ref 8.5–9.9)
CHLORIDE BLD-SCNC: 99 MEQ/L (ref 95–107)
CO2: 26 MEQ/L (ref 20–31)
CREAT SERPL-MCNC: 1.08 MG/DL (ref 0.7–1.2)
CREATININE URINE: 144.9 MG/DL
GFR AFRICAN AMERICAN: >60
GFR NON-AFRICAN AMERICAN: >60
GLUCOSE BLD-MCNC: 119 MG/DL (ref 70–99)
HBA1C MFR BLD: 6.3 % (ref 4.8–5.9)
MICROALBUMIN UR-MCNC: <1.2 MG/DL
MICROALBUMIN/CREAT UR-RTO: NORMAL MG/G (ref 0–30)
POTASSIUM SERPL-SCNC: 4.3 MEQ/L (ref 3.4–4.9)
SODIUM BLD-SCNC: 136 MEQ/L (ref 135–144)
T4 FREE: 1.66 NG/DL (ref 0.84–1.68)
TSH SERPL DL<=0.05 MIU/L-ACNC: 1.39 UIU/ML (ref 0.44–3.86)

## 2019-09-16 ENCOUNTER — OFFICE VISIT (OUTPATIENT)
Dept: ENDOCRINOLOGY | Age: 79
End: 2019-09-16
Payer: MEDICARE

## 2019-09-16 VITALS
HEART RATE: 77 BPM | HEIGHT: 71 IN | DIASTOLIC BLOOD PRESSURE: 72 MMHG | WEIGHT: 245 LBS | SYSTOLIC BLOOD PRESSURE: 134 MMHG | BODY MASS INDEX: 34.3 KG/M2

## 2019-09-16 DIAGNOSIS — Z23 ENCOUNTER FOR IMMUNIZATION: ICD-10-CM

## 2019-09-16 DIAGNOSIS — E03.9 HYPOTHYROIDISM, UNSPECIFIED TYPE: ICD-10-CM

## 2019-09-16 DIAGNOSIS — E11.9 TYPE 2 DIABETES MELLITUS WITHOUT COMPLICATION, WITHOUT LONG-TERM CURRENT USE OF INSULIN (HCC): ICD-10-CM

## 2019-09-16 DIAGNOSIS — Z79.4 TYPE 2 DIABETES MELLITUS WITH HYPERGLYCEMIA, WITH LONG-TERM CURRENT USE OF INSULIN (HCC): Primary | ICD-10-CM

## 2019-09-16 DIAGNOSIS — Z76.0 MEDICATION REFILL: ICD-10-CM

## 2019-09-16 DIAGNOSIS — E11.65 TYPE 2 DIABETES MELLITUS WITH HYPERGLYCEMIA, WITH LONG-TERM CURRENT USE OF INSULIN (HCC): Primary | ICD-10-CM

## 2019-09-16 LAB
CHP ED QC CHECK: NORMAL
GLUCOSE BLD-MCNC: 248 MG/DL

## 2019-09-16 PROCEDURE — 99213 OFFICE O/P EST LOW 20 MIN: CPT | Performed by: INTERNAL MEDICINE

## 2019-09-16 PROCEDURE — 1036F TOBACCO NON-USER: CPT | Performed by: INTERNAL MEDICINE

## 2019-09-16 PROCEDURE — G8427 DOCREV CUR MEDS BY ELIG CLIN: HCPCS | Performed by: INTERNAL MEDICINE

## 2019-09-16 PROCEDURE — 1123F ACP DISCUSS/DSCN MKR DOCD: CPT | Performed by: INTERNAL MEDICINE

## 2019-09-16 PROCEDURE — G8417 CALC BMI ABV UP PARAM F/U: HCPCS | Performed by: INTERNAL MEDICINE

## 2019-09-16 PROCEDURE — 4040F PNEUMOC VAC/ADMIN/RCVD: CPT | Performed by: INTERNAL MEDICINE

## 2019-09-16 PROCEDURE — 90662 IIV NO PRSV INCREASED AG IM: CPT | Performed by: INTERNAL MEDICINE

## 2019-09-16 PROCEDURE — 82962 GLUCOSE BLOOD TEST: CPT | Performed by: INTERNAL MEDICINE

## 2019-09-16 PROCEDURE — G0008 ADMIN INFLUENZA VIRUS VAC: HCPCS | Performed by: INTERNAL MEDICINE

## 2019-09-16 RX ORDER — GLIMEPIRIDE 2 MG/1
TABLET ORAL
Qty: 180 TABLET | Refills: 3 | Status: SHIPPED | OUTPATIENT
Start: 2019-09-16 | End: 2020-09-14 | Stop reason: SDUPTHER

## 2019-09-16 RX ORDER — LISINOPRIL AND HYDROCHLOROTHIAZIDE 20; 12.5 MG/1; MG/1
TABLET ORAL
Qty: 90 TABLET | Refills: 3 | Status: SHIPPED | OUTPATIENT
Start: 2019-09-16 | End: 2020-09-14 | Stop reason: SDUPTHER

## 2019-09-16 RX ORDER — LEVOTHYROXINE SODIUM 137 UG/1
TABLET ORAL
Qty: 90 TABLET | Refills: 3 | Status: SHIPPED | OUTPATIENT
Start: 2019-09-16 | End: 2020-09-14 | Stop reason: SDUPTHER

## 2019-09-16 NOTE — PROGRESS NOTES
Subjective:      Patient ID: Chao Kruger is a 67 y.o. male. 6-month follow-up on diabetes hypothyroidism  Diabetes   He presents for his follow-up diabetic visit. He has type 2 diabetes mellitus. Associated symptoms include weakness. Symptoms are stable. Diabetic complications include a CVA. Current diabetic treatment includes oral agent (dual therapy) (Metformin plus glimepiride). His overall blood glucose range is 110-130 mg/dl. (Lab Results       Component                Value               Date                       LABA1C                   6.3 (H)             09/13/2019            ) An ACE inhibitor/angiotensin II receptor blocker is being taken. Hypothyroidism on Synthroid 137 mcg daily    Results for Martina Toribio (MRN [id number]) as of 9/16/2019 09:44   Ref.  Range 9/13/2019 07:52 9/13/2019 07:56 9/16/2019 09:08   Sodium Latest Ref Range: 135 - 144 mEq/L 136     Potassium Latest Ref Range: 3.4 - 4.9 mEq/L 4.3     Chloride Latest Ref Range: 95 - 107 mEq/L 99     CO2 Latest Ref Range: 20 - 31 mEq/L 26     BUN Latest Ref Range: 8 - 23 mg/dL 15     Creatinine Latest Ref Range: 0.70 - 1.20 mg/dL 1.08     Anion Gap Latest Ref Range: 9 - 15 mEq/L 11     GFR Non- Latest Ref Range: >60  >60.0     GFR African American Latest Ref Range: >60  >60.0     Glucose Latest Units: mg/dL 119 (H)  248   Calcium Latest Ref Range: 8.5 - 9.9 mg/dL 9.0     Hemoglobin A1C Latest Ref Range: 4.8 - 5.9 % 6.3 (H)     TSH Latest Ref Range: 0.440 - 3.860 uIU/mL 1.390     T4 Free Latest Ref Range: 0.84 - 1.68 ng/dL 1.66     Creatinine, Ur Latest Ref Range: Not Established mg/dL  144.9    Microalbumin Creatinine Ratio Latest Ref Range: 0.0 - 30.0 mg/G  see below    Microalbumin, Random Urine Latest Ref Range: Not Established mg/dL  <1.20        Patient still complains of issues with balance  Requesting a flu shot    Patient Active Problem List   Diagnosis    Type 2 diabetes mellitus with hyperglycemia, with long-term current use of insulin ([facility] [address]    Hypothyroidism    Hypertension    Hyperlipidemia    Impaired mobility and activities of daily living    Post-traumatic subdural hematoma (HCC)    Late effects of CVA (cerebrovascular accident)    Gait abnormality dt TBI -acute rehab admission 8/17/18    Closed fracture of nasal bone with routine healing    High risk medication use-Ultram    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 [facility] admit 08/17/18.  Alcohol use    Deviated nasal septum    Hypertrophy of nasal turbinates    Obstructive sleep apnea (adult) (pediatric)     No Known Allergies      Current Outpatient Medications:     lisinopril-hydrochlorothiazide (PRINZIDE;ZESTORETIC) 20-12.5 MG per tablet, TAKE 1 TABLET BY MOUTH ONCE DAILY, Disp: 90 tablet, Rfl: 3    levothyroxine (SYNTHROID) 137 MCG tablet, TAKE 1 TABLET BY MOUTH ONCE DAILY, Disp: 90 tablet, Rfl: 3    glimepiride (AMARYL) 2 MG tablet, TAKE 1 TABLET BY MOUTH TWICE DAILY, Disp: 180 tablet, Rfl: 3    metFORMIN (GLUCOPHAGE) 1000 MG tablet, TAKE 1 TABLET BY MOUTH TWICE DAILY WITH MEALS, Disp: 180 tablet, Rfl: 3    simvastatin (ZOCOR) 20 MG tablet, TAKE 1 TABLET BY MOUTH ONCE NIGHTLY, Disp: 30 tablet, Rfl: 3    clopidogrel (PLAVIX) 75 MG tablet, TAKE 1 TABLET BY MOUTH ONCE DAILY, Disp: 30 tablet, Rfl: 0    oxymetazoline (AFRIN 12 HOUR) 0.05 % nasal spray, 2 sprays by Nasal route 2 times daily, Disp: , Rfl:     aspirin 81 MG tablet, Take 81 mg by mouth daily, Disp: , Rfl:     fluticasone (FLONASE) 50 MCG/ACT nasal spray, 2 sprays by Nasal route daily, Disp: 1 Bottle, Rfl: 2    benzonatate (TESSALON) 100 MG capsule, Take 1 capsule by mouth 3 times daily as needed for Cough, Disp: 90 capsule, Rfl: 0      Review of Systems   Musculoskeletal: Positive for gait problem. Neurological: Positive for weakness.      Vitals:    09/16/19 0901   BP: 134/72   Site: Right Upper Arm   Position: Sitting   Cuff Size: Large Adult   Pulse: 77   Weight: 245 lb (111.1 kg)   Height: 5' 11\" (1.803 m)       Objective:   Physical Exam   Constitutional: He appears well-developed and well-nourished. HENT:   Head: Normocephalic and atraumatic. Neck: Neck supple. Cardiovascular: Normal rate. Pulmonary/Chest: Breath sounds normal.   Musculoskeletal: Normal range of motion. Neurological: He is alert. Skin: Skin is warm. Psychiatric: He has a normal mood and affect. Assessment:       Diagnosis Orders   1. Type 2 diabetes mellitus with hyperglycemia, with long-term current use of insulin ([facility])  POCT Glucose    Basic Metabolic Panel    Hemoglobin A1C   2. Medication refill  levothyroxine (SYNTHROID) 137 MCG tablet    metFORMIN (GLUCOPHAGE) 1000 MG tablet   3. Type 2 diabetes mellitus without complication, without long-term current use of insulin ([facility])  metFORMIN (GLUCOPHAGE) 1000 MG tablet   4.  Hypothyroidism, unspecified type  Basic Metabolic Panel    TSH without Reflex    T4, Free           Plan:      Orders Placed This Encounter   Procedures    Basic Metabolic Panel     Standing Status:   Future     Standing Expiration Date:   9/15/2020    Hemoglobin A1C     Standing Status:   Future     Standing Expiration Date:   9/15/2020    TSH without Reflex     Standing Status:   Future     Standing Expiration Date:   9/15/2020    T4, Free     Standing Status:   Future     Standing Expiration Date:   9/16/2020    POCT Glucose     Orders Placed This Encounter   Medications    lisinopril-hydrochlorothiazide (PRINZIDE;ZESTORETIC) 20-12.5 MG per tablet     Sig: TAKE 1 TABLET BY MOUTH ONCE DAILY     Dispense:  90 tablet     Refill:  3     Please consider 90 day supplies to promote better adherence    levothyroxine (SYNTHROID) 137 MCG tablet     Sig: TAKE 1 TABLET BY MOUTH ONCE DAILY     Dispense:  90 tablet     Refill:  3     An office visit is required as soon as possible    glimepiride (AMARYL) 2 MG tablet     Sig: TAKE 1

## 2020-02-11 ENCOUNTER — OFFICE VISIT (OUTPATIENT)
Dept: INTERNAL MEDICINE CLINIC | Age: 80
End: 2020-02-11
Payer: MEDICARE

## 2020-02-11 VITALS
HEART RATE: 74 BPM | SYSTOLIC BLOOD PRESSURE: 130 MMHG | BODY MASS INDEX: 32.51 KG/M2 | HEIGHT: 72 IN | WEIGHT: 240 LBS | TEMPERATURE: 97.8 F | RESPIRATION RATE: 12 BRPM | DIASTOLIC BLOOD PRESSURE: 79 MMHG

## 2020-02-11 PROCEDURE — 99214 OFFICE O/P EST MOD 30 MIN: CPT | Performed by: FAMILY MEDICINE

## 2020-02-11 PROCEDURE — G8427 DOCREV CUR MEDS BY ELIG CLIN: HCPCS | Performed by: FAMILY MEDICINE

## 2020-02-11 PROCEDURE — G8417 CALC BMI ABV UP PARAM F/U: HCPCS | Performed by: FAMILY MEDICINE

## 2020-02-11 PROCEDURE — 4040F PNEUMOC VAC/ADMIN/RCVD: CPT | Performed by: FAMILY MEDICINE

## 2020-02-11 PROCEDURE — 96372 THER/PROPH/DIAG INJ SC/IM: CPT | Performed by: FAMILY MEDICINE

## 2020-02-11 PROCEDURE — G8482 FLU IMMUNIZE ORDER/ADMIN: HCPCS | Performed by: FAMILY MEDICINE

## 2020-02-11 PROCEDURE — 1123F ACP DISCUSS/DSCN MKR DOCD: CPT | Performed by: FAMILY MEDICINE

## 2020-02-11 PROCEDURE — 1036F TOBACCO NON-USER: CPT | Performed by: FAMILY MEDICINE

## 2020-02-11 RX ORDER — IPRATROPIUM BROMIDE AND ALBUTEROL SULFATE 2.5; .5 MG/3ML; MG/3ML
1 SOLUTION RESPIRATORY (INHALATION) ONCE
Status: COMPLETED | OUTPATIENT
Start: 2020-02-11 | End: 2020-02-11

## 2020-02-11 RX ORDER — METHYLPREDNISOLONE 4 MG/1
TABLET ORAL
Qty: 1 KIT | Refills: 0 | Status: SHIPPED | OUTPATIENT
Start: 2020-02-12 | End: 2020-08-13 | Stop reason: ALTCHOICE

## 2020-02-11 RX ORDER — ALBUTEROL SULFATE 90 UG/1
2 AEROSOL, METERED RESPIRATORY (INHALATION) EVERY 6 HOURS PRN
Qty: 1 INHALER | Refills: 0 | Status: SHIPPED | OUTPATIENT
Start: 2020-02-11 | End: 2020-08-13 | Stop reason: ALTCHOICE

## 2020-02-11 RX ORDER — METHYLPREDNISOLONE SODIUM SUCCINATE 40 MG/ML
40 INJECTION, POWDER, LYOPHILIZED, FOR SOLUTION INTRAMUSCULAR; INTRAVENOUS ONCE
Status: COMPLETED | OUTPATIENT
Start: 2020-02-11 | End: 2020-02-11

## 2020-02-11 RX ORDER — BENZONATATE 100 MG/1
100 CAPSULE ORAL 3 TIMES DAILY PRN
Qty: 90 CAPSULE | Refills: 0 | Status: SHIPPED | OUTPATIENT
Start: 2020-02-11 | End: 2020-08-13 | Stop reason: ALTCHOICE

## 2020-02-11 RX ORDER — CEFTRIAXONE 1 G/1
1 INJECTION, POWDER, FOR SOLUTION INTRAMUSCULAR; INTRAVENOUS ONCE
Status: COMPLETED | OUTPATIENT
Start: 2020-02-11 | End: 2020-02-11

## 2020-02-11 RX ORDER — OXYMETAZOLINE HYDROCHLORIDE 0.05 G/100ML
2 SPRAY NASAL 2 TIMES DAILY
Qty: 1 BOTTLE | Refills: 0 | Status: SHIPPED | OUTPATIENT
Start: 2020-02-11 | End: 2020-02-14

## 2020-02-11 RX ORDER — DOXYCYCLINE HYCLATE 100 MG
100 TABLET ORAL 2 TIMES DAILY
Qty: 20 TABLET | Refills: 0 | Status: SHIPPED | OUTPATIENT
Start: 2020-02-11 | End: 2020-02-21

## 2020-02-11 RX ADMIN — IPRATROPIUM BROMIDE AND ALBUTEROL SULFATE 1 AMPULE: 2.5; .5 SOLUTION RESPIRATORY (INHALATION) at 16:45

## 2020-02-11 RX ADMIN — METHYLPREDNISOLONE SODIUM SUCCINATE 40 MG: 40 INJECTION, POWDER, LYOPHILIZED, FOR SOLUTION INTRAMUSCULAR; INTRAVENOUS at 16:41

## 2020-02-11 RX ADMIN — METHYLPREDNISOLONE SODIUM SUCCINATE 40 MG: 40 INJECTION, POWDER, LYOPHILIZED, FOR SOLUTION INTRAMUSCULAR; INTRAVENOUS at 16:43

## 2020-02-11 RX ADMIN — CEFTRIAXONE 1 G: 1 INJECTION, POWDER, FOR SOLUTION INTRAMUSCULAR; INTRAVENOUS at 16:37

## 2020-02-11 ASSESSMENT — PATIENT HEALTH QUESTIONNAIRE - PHQ9
10. IF YOU CHECKED OFF ANY PROBLEMS, HOW DIFFICULT HAVE THESE PROBLEMS MADE IT FOR YOU TO DO YOUR WORK, TAKE CARE OF THINGS AT HOME, OR GET ALONG WITH OTHER PEOPLE: 0
9. THOUGHTS THAT YOU WOULD BE BETTER OFF DEAD, OR OF HURTING YOURSELF: 0
5. POOR APPETITE OR OVEREATING: 0
3. TROUBLE FALLING OR STAYING ASLEEP: 3
8. MOVING OR SPEAKING SO SLOWLY THAT OTHER PEOPLE COULD HAVE NOTICED. OR THE OPPOSITE, BEING SO FIGETY OR RESTLESS THAT YOU HAVE BEEN MOVING AROUND A LOT MORE THAN USUAL: 0
SUM OF ALL RESPONSES TO PHQ9 QUESTIONS 1 & 2: 3
SUM OF ALL RESPONSES TO PHQ QUESTIONS 1-9: 12
6. FEELING BAD ABOUT YOURSELF - OR THAT YOU ARE A FAILURE OR HAVE LET YOURSELF OR YOUR FAMILY DOWN: 0
1. LITTLE INTEREST OR PLEASURE IN DOING THINGS: 0
4. FEELING TIRED OR HAVING LITTLE ENERGY: 3
7. TROUBLE CONCENTRATING ON THINGS, SUCH AS READING THE NEWSPAPER OR WATCHING TELEVISION: 3
SUM OF ALL RESPONSES TO PHQ QUESTIONS 1-9: 12
2. FEELING DOWN, DEPRESSED OR HOPELESS: 3

## 2020-02-11 NOTE — PROGRESS NOTES
soluSubjective:      Patient ID: Kaia Kruger is a 67 y.o. male who presents for:  Chief Complaint   Patient presents with    Congestion     Patient presents today for congestion. He states that it has been going on for 2 weeks with no relief. Mr. Shirley Kruger presents to the office today with a 2-week h/o nasal congestion associated with sinus pressure, sinus pressure, headache, PND, cough and wheezes. No associated fever, dysphagia or pleuritic chest pain.       Past Medical History:   Diagnosis Date    Acute CVA (cerebrovascular accident) ([address] 6/3/2015    Left lacunar in centrum semi-ovale got TPA 5-31-15 Dr Rojas Briggs Hyperlipidemia     on meds x 3 yrs    Hypertension     on meds x 3 yrs    Hypothyroidism     Obstructive sleep apnea     does not use CPAP    Type II or unspecified type diabetes mellitus without mention of complication, not stated as uncontrolled      Past Surgical History:   Procedure Laterality Date    DENTAL SURGERY      SEPTOPLASTY Bilateral 2019    SEPTOPLASTY, MICRODEBRIDER ASSISTED TURBINOPLASTY AND OUT-FRACTURING BILATERAL NASAL ENDOSCOPY performed by Daisy Whittington MD at [address] [place] [address] History     Socioeconomic History    Marital status:      Spouse name: Not on file    Number of children: Not on file    Years of education: Not on file    Highest education level: Not on file   Occupational History    Not on file   Social Needs    Financial resource strain: Not on file    Food insecurity:     Worry: Not on file     Inability: Not on file    Transportation needs:     Medical: Not on file     Non-medical: Not on file   Tobacco Use    Smoking status: Former Smoker     Packs/day: 1.00     Years: 20.00     Pack years: 20.00     Start date: 10/9/1968     Last attempt to quit: 1980     Years since quittin.1    Smokeless tobacco: Never Used   Substance and Sexual Activity    Alcohol use: Yes     Comment: socially    Drug use: No    Sexual activity: Not Currently     Partners: Female   Lifestyle    Physical activity:     Days per week: Not on file     Minutes per session: Not on file    Stress: Not on file   Relationships    Social connections:     Talks on phone: Not on file     Gets together: Not on file     Attends Latter day service: Not on file     Active member of club or organization: Not on file     Attends meetings of clubs or organizations: Not on file     Relationship status: Not on file    Intimate partner violence:     Fear of current or ex partner: Not on file     Emotionally abused: Not on file     Physically abused: Not on file     Forced sexual activity: Not on file   Other Topics Concern    Not on file   Social History Narrative         Lives With: Spouse    Type of Home: House    Home Layout: Two level (20 with hand rails)    Home Access: Stairs to enter with rails    Entrance Stairs - Number of Steps: 3    Entrance Stairs - Rails: Right    Bathroom Shower/Tub: Walk-in shower    Bathroom Equipment: Shower chair    Home Equipment:  (no DME)    ADL Assistance: Independent    Homemaking Assistance: Independent    Homemaking Responsibilities: Yes (shared)    Ambulation Assistance: Independent    Transfer Assistance: Independent     Family History   Problem Relation Age of Onset    Early Death Mother     Cancer Mother         lung cancer    No Known Problems Father     No Known Problems Daughter      Allergies:  Patient has no known allergies. Review of Systems   HENT: Positive for sinus pressure and sinus pain. Negative for trouble swallowing. Respiratory: Positive for cough and wheezing. Negative for shortness of breath. Cardiovascular: Negative for chest pain and leg swelling. Gastrointestinal: Negative for abdominal pain, nausea and vomiting. Musculoskeletal: Positive for gait problem. Skin: Negative for wound. Neurological: Negative for dizziness. Hematological: Does not bruise/bleed easily. Psychiatric/Behavioral: Negative for self-injury, sleep disturbance and suicidal ideas. Objective:   /79 (Site: Left Upper Arm, Position: Sitting, Cuff Size: Large Adult)   Pulse 74   Temp 97.8 °F (36.6 °C) (Oral)   Resp 12   Ht 6' (1.829 m)   Wt 240 lb (108.9 kg)   BMI 32.55 kg/m²      Physical Exam  Vitals signs and nursing note reviewed. Constitutional:       General: He is not in acute distress. Appearance: He is well-developed. He is not diaphoretic. HENT:      Head: Normocephalic and atraumatic. Right Ear: External ear normal. There is impacted cerumen. Left Ear: External ear normal. There is impacted cerumen. Nose: Nose normal.   Eyes:      General:         Right eye: No discharge. Conjunctiva/sclera: Conjunctivae normal.      Pupils: Pupils are equal, round, and reactive to light. Neck:      Musculoskeletal: Normal range of motion and neck supple. Thyroid: No thyromegaly. Cardiovascular:      Rate and Rhythm: Normal rate and regular rhythm. Heart sounds: Normal heart sounds. No murmur. Pulmonary:      Effort: Pulmonary effort is normal. No respiratory distress. Breath sounds: Wheezing present. Chest:      Chest wall: No tenderness. Abdominal:      General: Bowel sounds are normal.      Palpations: Abdomen is soft. Musculoskeletal: Normal range of motion. General: No tenderness. Lymphadenopathy:      Cervical: No cervical adenopathy. Skin:     General: Skin is warm and dry. Findings: No rash. Neurological:      Mental Status: He is alert and oriented to person, place, and time. Cranial Nerves: No cranial nerve deficit. Motor: No abnormal muscle tone. Deep Tendon Reflexes: Reflexes are normal and symmetric. Psychiatric:         Behavior: Behavior normal.         Thought Content: Thought content normal.         Judgment: Judgment normal.         Assessment:       Diagnosis Orders   1.  Acute pansinusitis, recurrence not specified  cefTRIAXone (ROCEPHIN) injection 1 g    doxycycline hyclate (VIBRA-TABS) 100 MG tablet    oxymetazoline (12 HOUR NASAL SPRAY) 0.05 % nasal spray    methylPREDNISolone sodium (SOLU-MEDROL) injection 40 mg   2. Acute bronchitis, unspecified organism  methylPREDNISolone sodium (SOLU-MEDROL) injection 40 mg    ipratropium-albuterol (DUONEB) nebulizer solution 1 ampule    cefTRIAXone (ROCEPHIN) injection 1 g    methylPREDNISolone (MEDROL, STEVAN,) 4 MG tablet    doxycycline hyclate (VIBRA-TABS) 100 MG tablet    albuterol sulfate  (90 Base) MCG/ACT inhaler    methylPREDNISolone sodium (SOLU-MEDROL) injection 40 mg   3. Wheezing on expiration  ipratropium-albuterol (DUONEB) nebulizer solution 1 ampule    albuterol sulfate  (90 Base) MCG/ACT inhaler   4. Cough  benzonatate (TESSALON) 100 MG capsule   5. PND (post-nasal drip)  oxymetazoline (12 HOUR NASAL SPRAY) 0.05 % nasal spray   6. Bilateral impacted cerumen  WA REMOVAL IMPACTED CERUMEN INSTRUMENTATION UNILAT         Plan:      Orders Placed This Encounter   Procedures    WA REMOVAL IMPACTED CERUMEN INSTRUMENTATION UNILAT     Orders Placed This Encounter   Medications    methylPREDNISolone sodium (SOLU-MEDROL) injection 40 mg    ipratropium-albuterol (DUONEB) nebulizer solution 1 ampule    cefTRIAXone (ROCEPHIN) injection 1 g    methylPREDNISolone (MEDROL, STEVAN,) 4 MG tablet     Sig: Take by mouth.      Dispense:  1 kit     Refill:  0    doxycycline hyclate (VIBRA-TABS) 100 MG tablet     Sig: Take 1 tablet by mouth 2 times daily for 10 days     Dispense:  20 tablet     Refill:  0    benzonatate (TESSALON) 100 MG capsule     Sig: Take 1 capsule by mouth 3 times daily as needed for Cough     Dispense:  90 capsule     Refill:  0    oxymetazoline (12 HOUR NASAL SPRAY) 0.05 % nasal spray     Si sprays by Nasal route 2 times daily for 3 days     Dispense:  1 Bottle     Refill:  0    albuterol sulfate  (90 Base) MCG/ACT inhaler     Sig: Inhale 2 puffs into the lungs every 6 hours as needed for Wheezing or Shortness of Breath     Dispense:  1 Inhaler     Refill:  0    methylPREDNISolone sodium (SOLU-MEDROL) injection 40 mg       Return in about 2 weeks (around 2/25/2020).

## 2020-02-23 ASSESSMENT — ENCOUNTER SYMPTOMS
SINUS PRESSURE: 1
WHEEZING: 1
TROUBLE SWALLOWING: 0
ABDOMINAL PAIN: 0
SHORTNESS OF BREATH: 0
VOMITING: 0
COUGH: 1
SINUS PAIN: 1
NAUSEA: 0

## 2020-06-04 ENCOUNTER — TELEPHONE (OUTPATIENT)
Dept: FAMILY MEDICINE CLINIC | Age: 80
End: 2020-06-04

## 2020-08-13 ENCOUNTER — HOSPITAL ENCOUNTER (OUTPATIENT)
Dept: GENERAL RADIOLOGY | Age: 80
Discharge: HOME | End: 2020-08-15
Payer: MEDICARE

## 2020-08-13 ENCOUNTER — OFFICE VISIT (OUTPATIENT)
Dept: FAMILY MEDICINE CLINIC | Age: 80
End: 2020-08-13
Payer: MEDICARE

## 2020-08-13 VITALS
TEMPERATURE: 98 F | SYSTOLIC BLOOD PRESSURE: 142 MMHG | OXYGEN SATURATION: 98 % | RESPIRATION RATE: 15 BRPM | HEART RATE: 74 BPM | DIASTOLIC BLOOD PRESSURE: 84 MMHG

## 2020-08-13 DIAGNOSIS — R35.0 URINARY FREQUENCY: ICD-10-CM

## 2020-08-13 LAB
BILIRUBIN, POC: NORMAL
BLOOD URINE, POC: NORMAL
CHP ED QC CHECK: ABNORMAL
CLARITY, POC: CLEAR
COLOR, POC: YELLOW
GLUCOSE BLD-MCNC: 145 MG/DL
GLUCOSE URINE, POC: NORMAL
KETONES, POC: NORMAL
LEUKOCYTE EST, POC: NORMAL
NITRITE, POC: NORMAL
PH, POC: 6
PROTEIN, POC: NORMAL
SPECIFIC GRAVITY, POC: 1.02
UROBILINOGEN, POC: NORMAL

## 2020-08-13 PROCEDURE — 73502 X-RAY EXAM HIP UNI 2-3 VIEWS: CPT

## 2020-08-13 PROCEDURE — 1123F ACP DISCUSS/DSCN MKR DOCD: CPT | Performed by: NURSE PRACTITIONER

## 2020-08-13 PROCEDURE — 1036F TOBACCO NON-USER: CPT | Performed by: NURSE PRACTITIONER

## 2020-08-13 PROCEDURE — 4040F PNEUMOC VAC/ADMIN/RCVD: CPT | Performed by: NURSE PRACTITIONER

## 2020-08-13 PROCEDURE — G8428 CUR MEDS NOT DOCUMENT: HCPCS | Performed by: NURSE PRACTITIONER

## 2020-08-13 PROCEDURE — G8417 CALC BMI ABV UP PARAM F/U: HCPCS | Performed by: NURSE PRACTITIONER

## 2020-08-13 PROCEDURE — 82962 GLUCOSE BLOOD TEST: CPT | Performed by: NURSE PRACTITIONER

## 2020-08-13 PROCEDURE — 99213 OFFICE O/P EST LOW 20 MIN: CPT | Performed by: NURSE PRACTITIONER

## 2020-08-13 PROCEDURE — 81003 URINALYSIS AUTO W/O SCOPE: CPT | Performed by: NURSE PRACTITIONER

## 2020-08-13 ASSESSMENT — ENCOUNTER SYMPTOMS
CONSTIPATION: 0
VOMITING: 0
COLOR CHANGE: 0
SHORTNESS OF BREATH: 0
ABDOMINAL PAIN: 0
DIARRHEA: 0
RECTAL PAIN: 0
COUGH: 0
WHEEZING: 0

## 2020-08-13 NOTE — PATIENT INSTRUCTIONS
We will call with results  Can continue motrin or tylenol for pain control   Follow up with PCP next week for urinary frequency may need additional testing , urine culture sent today

## 2020-08-13 NOTE — PROGRESS NOTES
uncontrolled      Past Surgical History:   Procedure Laterality Date    DENTAL SURGERY      SEPTOPLASTY Bilateral 2019    SEPTOPLASTY, MICRODEBRIDER ASSISTED TURBINOPLASTY AND OUT-FRACTURING BILATERAL NASAL ENDOSCOPY performed by Dinorah Gavin MD at [address] [place] [address] History     Socioeconomic History    Marital status:      Spouse name: Not on file    Number of children: Not on file    Years of education: Not on file    Highest education level: Not on file   Occupational History    Not on file   Social Needs    Financial resource strain: Not on file    Food insecurity     Worry: Not on file     Inability: Not on file    Transportation needs     Medical: Not on file     Non-medical: Not on file   Tobacco Use    Smoking status: Former Smoker     Packs/day: 1.00     Years: 20.00     Pack years: 20.00     Start date: 10/9/1968     Last attempt to quit:      Years since quittin.6    Smokeless tobacco: Never Used   Substance and Sexual Activity    Alcohol use: Yes     Comment: socially    Drug use: No    Sexual activity: Not Currently     Partners: Female   Lifestyle    Physical activity     Days per week: Not on file     Minutes per session: Not on file    Stress: Not on file   Relationships    Social connections     Talks on phone: Not on file     Gets together: Not on file     Attends Faith service: Not on file     Active member of club or organization: Not on file     Attends meetings of clubs or organizations: Not on file     Relationship status: Not on file    Intimate partner violence     Fear of current or ex partner: Not on file     Emotionally abused: Not on file     Physically abused: Not on file     Forced sexual activity: Not on file   Other Topics Concern    Not on file   Social History Narrative         Lives With: Spouse    Type of Home: House    Home Layout: Two level (20 with hand rails)    Home Access: Stairs to enter with rails    Entrance Stairs - eye: No discharge. Left eye: No discharge. Extraocular Movements: Extraocular movements intact. Conjunctiva/sclera: Conjunctivae normal.   Neck:      Musculoskeletal: Normal range of motion. Cardiovascular:      Rate and Rhythm: Normal rate and regular rhythm. Pulses: Normal pulses. Heart sounds: Normal heart sounds. Pulmonary:      Effort: Pulmonary effort is normal. No respiratory distress. Breath sounds: Normal breath sounds. No wheezing or rales. Abdominal:      General: Bowel sounds are normal. There is no distension. Palpations: Abdomen is soft. Tenderness: There is no abdominal tenderness. Hernia: There is no hernia in the left inguinal area or right inguinal area. Genitourinary:     Scrotum/Testes: Normal.       Musculoskeletal:         General: No signs of injury. Right lower leg: No edema. Left lower leg: No edema. Lymphadenopathy:      Lower Body: No right inguinal adenopathy. No left inguinal adenopathy. Skin:     General: Skin is warm and dry. Neurological:      General: No focal deficit present. Mental Status: He is alert. Gait: Gait abnormal (uses cane/ unsteady). Psychiatric:         Mood and Affect: Mood normal.         Behavior: Behavior normal.       Assessment & Plan    Diagnosis Orders   1. Urinary frequency  POCT Urinalysis No Micro (Auto)    Culture, Urine    POCT Glucose   2. Right leg pain  XR HIP RIGHT (2-3 VIEWS)     On exam patient's pain was not inguinal no hernia noted no testicular swelling or tenderness  Due to location of pain will get xray to r/o referred pain from hip.   can continue to use motrin/tylenol for pain control and f/u with PCP   urinary freq- sent for culture, glucose in office completed.    Did not need to use restroom since coming to office- monitor and f/u with PCP        Side effects and adverse effects of any medication prescribed today, as well as treatment plan/rationale, follow-up care, and result expectations have been discussed with the patient. Expresses understanding and desires to proceed with treatment plan. Discussed signs and symptoms which require immediate follow-up in ED/call to 911. Understanding verbalized. I have reviewed and updated the electronic medical record. Orders Placed This Encounter   Procedures    Culture, Urine     Standing Status:   Future     Standing Expiration Date:   8/13/2021     Order Specific Question:   Specify (ex-cath, midstream, cysto, etc)? Answer:   midstream    XR HIP RIGHT (2-3 VIEWS)     Standing Status:   Future     Number of Occurrences:   1     Standing Expiration Date:   8/13/2021     Order Specific Question:   Reason for exam:     Answer:   R leg pain    POCT Urinalysis No Micro (Auto)    POCT Glucose     No orders of the defined types were placed in this encounter. Medications Discontinued During This Encounter   Medication Reason    albuterol sulfate  (90 Base) MCG/ACT inhaler Therapy completed    benzonatate (TESSALON) 100 MG capsule Therapy completed    clopidogrel (PLAVIX) 75 MG tablet Therapy completed    methylPREDNISolone (MEDROL, STEVAN,) 4 MG tablet Therapy completed    simvastatin (ZOCOR) 20 MG tablet Therapy completed     Return in about 1 week (around 8/20/2020), or if symptoms worsen or fail to improve. Reviewed with the patient: current clinical status,medications, activities and diet. Side effects, adverse effects of the medication prescribed today, as well as treatment plan/ rationale and result expectations have been discussed with the patient who expresses understanding and desires to proceed. Close follow up to evaluate treatment results and for coordination of care. I have reviewed the patient's medical history in detail and updated the computerized patient record.     Alexis Quiroz, APRN - CNP

## 2020-08-15 LAB — URINE CULTURE, ROUTINE: NORMAL

## 2020-08-26 ENCOUNTER — OFFICE VISIT (OUTPATIENT)
Dept: INTERNAL MEDICINE CLINIC | Age: 80
End: 2020-08-26
Payer: MEDICARE

## 2020-08-26 VITALS
DIASTOLIC BLOOD PRESSURE: 81 MMHG | SYSTOLIC BLOOD PRESSURE: 147 MMHG | WEIGHT: 239 LBS | BODY MASS INDEX: 32.41 KG/M2 | HEART RATE: 70 BPM

## 2020-08-26 PROCEDURE — 1123F ACP DISCUSS/DSCN MKR DOCD: CPT | Performed by: FAMILY MEDICINE

## 2020-08-26 PROCEDURE — 4040F PNEUMOC VAC/ADMIN/RCVD: CPT | Performed by: FAMILY MEDICINE

## 2020-08-26 PROCEDURE — 1036F TOBACCO NON-USER: CPT | Performed by: FAMILY MEDICINE

## 2020-08-26 PROCEDURE — G8417 CALC BMI ABV UP PARAM F/U: HCPCS | Performed by: FAMILY MEDICINE

## 2020-08-26 PROCEDURE — 99214 OFFICE O/P EST MOD 30 MIN: CPT | Performed by: FAMILY MEDICINE

## 2020-08-26 PROCEDURE — G8427 DOCREV CUR MEDS BY ELIG CLIN: HCPCS | Performed by: FAMILY MEDICINE

## 2020-08-26 RX ORDER — TIZANIDINE 2 MG/1
TABLET ORAL
Qty: 30 TABLET | Refills: 0 | Status: SHIPPED | OUTPATIENT
Start: 2020-08-26 | End: 2021-07-09

## 2020-08-26 RX ORDER — IPRATROPIUM BROMIDE 42 UG/1
2 SPRAY, METERED NASAL 4 TIMES DAILY
Qty: 1 BOTTLE | Refills: 3 | Status: SHIPPED | OUTPATIENT
Start: 2020-08-26 | End: 2021-07-09

## 2020-08-26 RX ORDER — AMOXICILLIN 250 MG
CAPSULE ORAL
Qty: 60 TABLET | Refills: 1 | Status: SHIPPED | OUTPATIENT
Start: 2020-08-26 | End: 2021-07-09

## 2020-08-26 RX ORDER — TRAMADOL HYDROCHLORIDE 50 MG/1
50 TABLET ORAL EVERY 8 HOURS PRN
Qty: 30 TABLET | Refills: 0 | Status: SHIPPED | OUTPATIENT
Start: 2020-08-26 | End: 2020-09-05

## 2020-08-26 NOTE — PROGRESS NOTES
Subjective:      Patient ID: Irene Kruger is a 79 y.o. male who presents for:  Chief Complaint   Patient presents with    Follow-up     patient pulled muscle in groin area while getting in the car and its still bothering him he was urinating about every 15 mins and now every hour had xrays and urinalaysis no infection, wants to know what he should do or how to strengthen it      Patient was seen and examined in the office today and he endorses sudden onset of severe pain in his right hip. According to him, he believes that he \"pulled a muscle\" while trying to get in his car few days ago. However, he believes he started having increased frequency of micturition since then but denies any associated gross hematuria, fever or nausea.        Past Medical History:   Diagnosis Date    [facility] 6/3/2015    Left lacunar in centrum semi-ovale got TPA 5-31-15 Dr Lois Jarrett Hyperlipidemia     on meds x 3 yrs    Hypertension     on meds x 3 yrs    Hypothyroidism     Obstructive sleep apnea     does not use CPAP    Type II or unspecified type diabetes mellitus without mention of complication, not stated as uncontrolled      Past Surgical History:   Procedure Laterality Date    DENTAL SURGERY      SEPTOPLASTY Bilateral 1/24/2019    SEPTOPLASTY, MICRODEBRIDER ASSISTED TURBINOPLASTY AND OUT-FRACTURING BILATERAL NASAL ENDOSCOPY performed by Courtney Gilmore MD at [address] [place] [address] History     Socioeconomic History    Marital status:      Spouse name: Not on file    Number of children: Not on file    Years of education: Not on file    Highest education level: Not on file   Occupational History    Not on file   Social Needs    Financial resource strain: Not on file    Food insecurity     Worry: Not on file     Inability: Not on file    Transportation needs     Medical: Not on file     Non-medical: Not on file   Tobacco Use    Smoking status: Former Smoker Packs/day: 1.00     Years: 20.00     Pack years: 20.00     Start date: 10/9/1968     Last attempt to quit: 1980     Years since quittin.7    Smokeless tobacco: Never Used   Substance and Sexual Activity    Alcohol use: Yes     Comment: socially    Drug use: No    Sexual activity: Not Currently     Partners: Female   Lifestyle    Physical activity     Days per week: Not on file     Minutes per session: Not on file    Stress: Not on file   Relationships    Social connections     Talks on phone: Not on file     Gets together: Not on file     Attends Cheondoism service: Not on file     Active member of club or organization: Not on file     Attends meetings of clubs or organizations: Not on file     Relationship status: Not on file    Intimate partner violence     Fear of current or ex partner: Not on file     Emotionally abused: Not on file     Physically abused: Not on file     Forced sexual activity: Not on file   Other Topics Concern    Not on file   Social History Narrative         Lives With: Spouse    Type of Home: House    Home Layout: Two level (20 with hand rails)    Home Access: Stairs to enter with rails    Entrance Stairs - Number of Steps: 3    Entrance Stairs - Rails: Right    Bathroom Shower/Tub: Walk-in shower    Bathroom Equipment: Shower chair    Home Equipment:  (no DME)    ADL Assistance: Independent    Homemaking Assistance: Independent    Homemaking Responsibilities: Yes (shared)    Ambulation Assistance: Independent    Transfer Assistance: Independent     Family History   Problem Relation Age of Onset    Early Death Mother     Cancer Mother         lung cancer    No Known Problems Father     No Known Problems Daughter      Allergies:  Patient has no known allergies. Review of Systems   Constitutional: Negative for fatigue and unexpected weight change. HENT: Positive for congestion, postnasal drip and rhinorrhea. Negative for sore throat, trouble swallowing and voice change. Eyes: Negative for visual disturbance. Respiratory: Negative for cough and shortness of breath. Cardiovascular: Negative for chest pain. Gastrointestinal: Negative for nausea. Endocrine: Positive for polyuria. Negative for cold intolerance and heat intolerance. Genitourinary: Positive for frequency and urgency. Negative for dysuria, flank pain, hematuria, penile pain and testicular pain. Musculoskeletal: Positive for arthralgias, back pain and myalgias. Skin: Negative for pallor. Neurological: Negative for dizziness. Objective:   BP (!) 147/81 (Site: Left Upper Arm, Position: Sitting, Cuff Size: Large Adult)   Pulse 70   Wt 239 lb (108.4 kg)   BMI 32.41 kg/m²      Physical Exam  Vitals signs and nursing note reviewed. Constitutional:       Appearance: He is obese. He is not ill-appearing. HENT:      Head: Normocephalic and atraumatic. Right Ear: External ear normal.      Left Ear: External ear normal.      Nose: Congestion and rhinorrhea present. Mouth/Throat:      Pharynx: No oropharyngeal exudate or posterior oropharyngeal erythema. Eyes:      Extraocular Movements: Extraocular movements intact. Pupils: Pupils are equal, round, and reactive to light. Neck:      Musculoskeletal: Neck supple. Cardiovascular:      Rate and Rhythm: Normal rate and regular rhythm. Heart sounds: Normal heart sounds. Pulmonary:      Effort: Pulmonary effort is normal.      Breath sounds: Normal breath sounds. Genitourinary:     Comments: deferred  Musculoskeletal:         General: Tenderness (right hip with mildly decreased RoM due to pain) present. Right lower leg: No edema. Left lower leg: No edema. Skin:     General: Skin is warm and dry. Neurological:      Mental Status: He is alert. Mental status is at baseline. Psychiatric:         Mood and Affect: Mood normal.         Assessment:       Diagnosis Orders   1.  Acute right hip pain  tiZANidine (ZANAFLEX) 2

## 2020-09-07 ASSESSMENT — ENCOUNTER SYMPTOMS
RHINORRHEA: 1
NAUSEA: 0
BACK PAIN: 1
COUGH: 0
SORE THROAT: 0
SHORTNESS OF BREATH: 0
VOICE CHANGE: 0
TROUBLE SWALLOWING: 0

## 2020-09-08 ENCOUNTER — OFFICE VISIT (OUTPATIENT)
Dept: INTERNAL MEDICINE CLINIC | Age: 80
End: 2020-09-08
Payer: MEDICARE

## 2020-09-08 VITALS
BODY MASS INDEX: 32.28 KG/M2 | HEART RATE: 70 BPM | DIASTOLIC BLOOD PRESSURE: 73 MMHG | OXYGEN SATURATION: 94 % | WEIGHT: 238 LBS | SYSTOLIC BLOOD PRESSURE: 135 MMHG

## 2020-09-08 PROBLEM — L98.499 SKIN ULCER OF FOREARM (HCC): Status: ACTIVE | Noted: 2020-09-08

## 2020-09-08 LAB
BILIRUBIN, POC: NORMAL
BLOOD URINE, POC: NORMAL
CHP ED QC CHECK: NORMAL
CLARITY, POC: CLEAR
COLOR, POC: NORMAL
GLUCOSE BLD-MCNC: 265 MG/DL
GLUCOSE URINE, POC: NORMAL
HBA1C MFR BLD: 7 %
KETONES, POC: NORMAL
LEUKOCYTE EST, POC: NORMAL
NITRITE, POC: NORMAL
PH, POC: 6
PROTEIN, POC: NORMAL
SPECIFIC GRAVITY, POC: 1.02
UROBILINOGEN, POC: 0.2

## 2020-09-08 PROCEDURE — 81002 URINALYSIS NONAUTO W/O SCOPE: CPT | Performed by: FAMILY MEDICINE

## 2020-09-08 PROCEDURE — 4040F PNEUMOC VAC/ADMIN/RCVD: CPT | Performed by: FAMILY MEDICINE

## 2020-09-08 PROCEDURE — 99215 OFFICE O/P EST HI 40 MIN: CPT | Performed by: FAMILY MEDICINE

## 2020-09-08 PROCEDURE — 1123F ACP DISCUSS/DSCN MKR DOCD: CPT | Performed by: FAMILY MEDICINE

## 2020-09-08 PROCEDURE — 82962 GLUCOSE BLOOD TEST: CPT | Performed by: FAMILY MEDICINE

## 2020-09-08 PROCEDURE — 1036F TOBACCO NON-USER: CPT | Performed by: FAMILY MEDICINE

## 2020-09-08 PROCEDURE — G8417 CALC BMI ABV UP PARAM F/U: HCPCS | Performed by: FAMILY MEDICINE

## 2020-09-08 PROCEDURE — 83036 HEMOGLOBIN GLYCOSYLATED A1C: CPT | Performed by: FAMILY MEDICINE

## 2020-09-08 PROCEDURE — 3051F HG A1C>EQUAL 7.0%<8.0%: CPT | Performed by: FAMILY MEDICINE

## 2020-09-08 PROCEDURE — G8427 DOCREV CUR MEDS BY ELIG CLIN: HCPCS | Performed by: FAMILY MEDICINE

## 2020-09-08 RX ORDER — IPRATROPIUM BROMIDE 42 UG/1
2 SPRAY, METERED NASAL 4 TIMES DAILY
Qty: 1 BOTTLE | Refills: 3 | Status: SHIPPED | OUTPATIENT
Start: 2020-09-08 | End: 2021-07-09

## 2020-09-08 ASSESSMENT — ENCOUNTER SYMPTOMS
VOICE CHANGE: 0
TROUBLE SWALLOWING: 0
NAUSEA: 0
SORE THROAT: 0
COLOR CHANGE: 0
COUGH: 1
DIARRHEA: 0
SHORTNESS OF BREATH: 0
VOMITING: 0

## 2020-09-08 NOTE — PROGRESS NOTES
Subjective:      Patient ID: Maricel Kruger is a 79 y.o. male who presents for:  Chief Complaint   Patient presents with    Follow-up     right hip pain has gotten better pt is still urinating frequently,     Patient was seen and examined in the office today as he presents, accompanied by his wife, for a follow-up visit of severe pain in his right hip and abnormal gait. He uses a straight walking cane to assist with ambulation and he admits he has experienced significant relief despite only taking OTC Acetaminophen 325 mg tablet instead of the prescribed Tramadol 50 mg tablet as directed. He admits to a remote history of a fall episode. However, he continues to complain about frequent micturition associated with fair stream. He denies associated flank pain, gross hematuria, scrotal pain or penile discharge. Meanwhile he admits that his BG levels run as high as 250 mg/dL during the day and about 160 mg/dL in the morning; and he takes the prescribed meds as directed. He denies any episode of hypoglycemia.       Past Medical History:   Diagnosis Date    Acute CVA (cerebrovascular accident) ([facility] 6/3/2015    Left lacunar in centrum semi-ovale got TPA 5-31-15 Dr Razo Cancer Hyperlipidemia     on meds x 3 yrs    Hypertension     on meds x 3 yrs    Hypothyroidism     Obstructive sleep apnea     does not use CPAP    Type II or unspecified type diabetes mellitus without mention of complication, not stated as uncontrolled      Past Surgical History:   Procedure Laterality Date    DENTAL SURGERY      SEPTOPLASTY Bilateral 1/24/2019    SEPTOPLASTY, MICRODEBRIDER ASSISTED TURBINOPLASTY AND OUT-FRACTURING BILATERAL NASAL ENDOSCOPY performed by Faith Goins MD at [facility] Marital status:      Spouse name: Not on file    Number of children: Not on file    Years of education: Not on file    Highest education level: Not on file   Occupational reactive to light. Neck:      Musculoskeletal: Neck supple. No neck rigidity. Cardiovascular:      Rate and Rhythm: Normal rate and regular rhythm. Heart sounds: Normal heart sounds. Pulmonary:      Effort: Pulmonary effort is normal.      Breath sounds: Normal breath sounds. Abdominal:      Comments: obese   Genitourinary:     Comments: deferred  Musculoskeletal:         General: Tenderness (right hip with mildly decreased RoM due to pain) present. Right lower leg: No edema. Left lower leg: No edema. Skin:     General: Skin is warm and dry. Neurological:      Mental Status: He is alert and oriented to person, place, and time. Mental status is at baseline. Gait: Gait abnormal (slow; and he is using a straight walking cane to assist with ambulation). Psychiatric:         Mood and Affect: Mood normal.         Behavior: Behavior normal.         Assessment:       Diagnosis Orders   1. Acute right hip pain     2. Type 2 diabetes mellitus with hyperglycemia, with long-term current use of insulin (HCC)  POCT glycosylated hemoglobin (Hb A1C)    POCT Glucose   3. Increased frequency of micturition  POCT Urinalysis no Micro    Estephanie Roman MD, [facility]   4. Abnormality of gait and mobility     5.  Rhinitis medicamentosa           Plan:      Orders Placed This Encounter   Procedures   Penny Crawford MD, [facility]     Referral Priority:   Routine     Referral Type:   Eval and Treat     Referral Reason:   Specialty Services Required     Referred to Provider:   Elva Luther MD     Requested Specialty:   Urology     Number of Visits Requested:   1    POCT glycosylated hemoglobin (Hb A1C)    POCT Glucose    POCT Urinalysis no Micro     Orders Placed This Encounter   Medications    ipratropium (ATROVENT) 0.06 % nasal spray     Si sprays by Each Nostril route 4 times daily     Dispense:  1 Bottle     Refill:  3     - avoid the use of decongestant and antihistamine drugs  - pt is reluctant going to see another Physician due to limited insurance coverage and income. I explained to him he is most likely having BPH but we also need to r/o possible urethral stricture or malignancy of the prostate gland etc  - he is not using his CPAP machine and tends to sleep in a recliner  - he was using Oxymetazoline nasal spray until he was advised to d/c it for fear of Rhinitis Medicamentosa. He should continue applying Flonase nasal spray in AM and because he has worsening nasal congestion associated with breathing difficulty at night, he should start Atrovent nasal spray at bedtime for now, and once he runs out of Flonase nasal spray, he should then apply Atrovent nasal spray three times daily as directed. - I spent more than 50% of my 44- minute encounter with the patient today to  him and to answer all of his questions. Return in about 3 months (around 12/8/2020), or if symptoms worsen or fail to improve.

## 2020-09-08 NOTE — PATIENT INSTRUCTIONS
Patient Education        Hip Pain: Care Instructions  Your Care Instructions     Hip pain may be caused by many things, including overuse, a fall, or a twisting movement. Another cause of hip pain is arthritis. Your pain may increase when you stand up, walk, or squat. The pain may come and go or may be constant. Home treatment can help relieve hip pain, swelling, and stiffness. If your pain is ongoing, you may need more tests and treatment. Follow-up care is a key part of your treatment and safety. Be sure to make and go to all appointments, and call your doctor if you are having problems. It's also a good idea to know your test results and keep a list of the medicines you take. How can you care for yourself at home? · Take pain medicines exactly as directed. ? If the doctor gave you a prescription medicine for pain, take it as prescribed. ? If you are not taking a prescription pain medicine, ask your doctor if you can take an over-the-counter medicine. · Rest and protect your hip. Take a break from any activity, including standing or walking, that may cause pain. · Put ice or a cold pack against your hip for 10 to 20 minutes at a time. Try to do this every 1 to 2 hours for the next 3 days (when you are awake) or until the swelling goes down. Put a thin cloth between the ice and your skin. · Sleep on your healthy side with a pillow between your knees, or sleep on your back with pillows under your knees. · If there is no swelling, you can put moist heat, a heating pad, or a warm cloth on your hip. Do gentle stretching exercises to help keep your hip flexible. · Learn how to prevent falls. Have your vision and hearing checked regularly. Wear slippers or shoes with a nonskid sole. · Stay at a healthy weight. · Wear comfortable shoes. When should you call for help? [id number] anytime you think you may need emergency care.  For example, call if:  · You have sudden chest pain and shortness of breath, or you

## 2020-09-14 ENCOUNTER — OFFICE VISIT (OUTPATIENT)
Dept: ENDOCRINOLOGY | Age: 80
End: 2020-09-14
Payer: MEDICARE

## 2020-09-14 VITALS
OXYGEN SATURATION: 95 % | DIASTOLIC BLOOD PRESSURE: 78 MMHG | HEART RATE: 70 BPM | BODY MASS INDEX: 32.41 KG/M2 | WEIGHT: 239 LBS | SYSTOLIC BLOOD PRESSURE: 133 MMHG

## 2020-09-14 LAB
CHP ED QC CHECK: NORMAL
GLUCOSE BLD-MCNC: 274 MG/DL

## 2020-09-14 PROCEDURE — G8427 DOCREV CUR MEDS BY ELIG CLIN: HCPCS | Performed by: INTERNAL MEDICINE

## 2020-09-14 PROCEDURE — 3051F HG A1C>EQUAL 7.0%<8.0%: CPT | Performed by: INTERNAL MEDICINE

## 2020-09-14 PROCEDURE — 82962 GLUCOSE BLOOD TEST: CPT | Performed by: INTERNAL MEDICINE

## 2020-09-14 PROCEDURE — 1123F ACP DISCUSS/DSCN MKR DOCD: CPT | Performed by: INTERNAL MEDICINE

## 2020-09-14 PROCEDURE — 4040F PNEUMOC VAC/ADMIN/RCVD: CPT | Performed by: INTERNAL MEDICINE

## 2020-09-14 PROCEDURE — G8417 CALC BMI ABV UP PARAM F/U: HCPCS | Performed by: INTERNAL MEDICINE

## 2020-09-14 PROCEDURE — 99213 OFFICE O/P EST LOW 20 MIN: CPT | Performed by: INTERNAL MEDICINE

## 2020-09-14 PROCEDURE — 1036F TOBACCO NON-USER: CPT | Performed by: INTERNAL MEDICINE

## 2020-09-14 RX ORDER — LISINOPRIL AND HYDROCHLOROTHIAZIDE 20; 12.5 MG/1; MG/1
TABLET ORAL
Qty: 90 TABLET | Refills: 3 | Status: SHIPPED | OUTPATIENT
Start: 2020-09-14 | End: 2021-09-02

## 2020-09-14 RX ORDER — GLIMEPIRIDE 2 MG/1
TABLET ORAL
Qty: 180 TABLET | Refills: 3 | Status: SHIPPED | OUTPATIENT
Start: 2020-09-14 | End: 2021-07-09

## 2020-09-14 RX ORDER — LEVOTHYROXINE SODIUM 137 UG/1
TABLET ORAL
Qty: 90 TABLET | Refills: 3 | Status: SHIPPED | OUTPATIENT
Start: 2020-09-14 | End: 2021-09-02

## 2020-09-14 NOTE — PROGRESS NOTES
Subjective:      Patient ID: Erik Kruger is a 79 y.o. male. Follow-up on diabetes hypothyroidism hypertension patient requesting refills seen primary care recently for a sprained muscle also is having increased frequency does not want to see a urologist patient not testing his sugar A1c was done last week 7.0 blood sugar was high today  Diabetes   He presents for his follow-up diabetic visit. He has type 2 diabetes mellitus. There are no hypoglycemic associated symptoms. There are no diabetic associated symptoms. Symptoms are stable. Diabetic complications include a CVA. Current diabetic treatment includes oral agent (dual therapy) (Metformin plus glimepiride). An ACE inhibitor/angiotensin II receptor blocker is being taken. Hypothyroidism on replacement no recent thyroid function test to review      Results for Padma Wilson (MRN [id number]) as of 9/14/2020 09:26   Ref. Range 9/8/2020 09:36 9/8/2020 09:43 9/8/2020 10:36 9/14/2020 09:21   Glucose Latest Units: mg/dL 265   274   Hemoglobin A1C Latest Units: %  7.0         Patient Active Problem List   Diagnosis    Type 2 diabetes mellitus with hyperglycemia, with long-term current use of insulin ([facility]    Hypothyroidism    Hypertension    Hyperlipidemia    Impaired mobility and activities of daily living    Post-traumatic subdural hematoma (HCC)    Late effects of CVA (cerebrovascular accident)    Gait abnormality dt TBI -acute rehab admission 8/17/18    Closed fracture of nasal bone with routine healing    High risk medication use-Ultram    Abnormality of 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 [facility] admit 08/17/18.     Alcohol use    Deviated nasal septum    Hypertrophy of nasal turbinates    Obstructive sleep apnea (adult) (pediatric)    Skin ulcer of forearm (HCC)     No Known Allergies      Current Outpatient Medications:     lisinopril-hydroCHLOROthiazide (PRINZIDE;ZESTORETIC) 20-12.5 MG per tablet, TAKE 1 TABLET BY MOUTH ONCE DAILY, Disp: 90 tablet, Rfl: 3    levothyroxine (SYNTHROID) 137 MCG tablet, TAKE 1 TABLET BY MOUTH ONCE DAILY, Disp: 90 tablet, Rfl: 3    glimepiride (AMARYL) 2 MG tablet, TAKE 1 TABLET BY MOUTH TWICE DAILY, Disp: 180 tablet, Rfl: 3    metFORMIN (GLUCOPHAGE) 1000 MG tablet, TAKE 1 TABLET BY MOUTH TWICE DAILY WITH MEALS, Disp: 180 tablet, Rfl: 3    ipratropium (ATROVENT) 0.06 % nasal spray, 2 sprays by Each Nostril route 4 times daily, Disp: 1 Bottle, Rfl: 3    ipratropium (ATROVENT) 0.06 % nasal spray, 2 sprays by Each Nostril route 4 times daily, Disp: 1 Bottle, Rfl: 3    tiZANidine (ZANAFLEX) 2 MG tablet, Take 4 mg tablet x 1, then 2 mg tablet q 8 hr prn muscle spasm; MAX: 8 mg/day, Disp: 30 tablet, Rfl: 0    senna-docusate (PERICOLACE) 8.6-50 MG per tablet, Take 1 tablet by mouth twice daily BUT HOLD for loose stools/Diarrhea, Disp: 60 tablet, Rfl: 1    aspirin 81 MG tablet, Take 81 mg by mouth daily, Disp: , Rfl:     fluticasone (FLONASE) 50 MCG/ACT nasal spray, 2 sprays by Nasal route daily, Disp: 1 Bottle, Rfl: 2      Review of Systems   Genitourinary: Positive for frequency. Vitals:    09/14/20 0918   BP: 133/78   Pulse: 70   SpO2: 95%   Weight: 239 lb (108.4 kg)       Objective:   Physical Exam  Constitutional:       Appearance: Normal appearance. He is obese. HENT:      Head: Normocephalic and atraumatic. Neck:      Musculoskeletal: Normal range of motion and neck supple. Cardiovascular:      Rate and Rhythm: Normal rate. Musculoskeletal: Normal range of motion. Neurological:      Mental Status: He is alert. Psychiatric:         Mood and Affect: Mood normal.         Assessment:       Diagnosis Orders   1. Type 2 diabetes mellitus with hyperglycemia, with long-term current use of insulin ([facility])  POCT Glucose   2. Hypothyroidism, unspecified type  Basic Metabolic Panel    T4, Free    TSH without Reflex   3.  Medication refill  levothyroxine (SYNTHROID) 137

## 2020-09-15 DIAGNOSIS — E03.9 HYPOTHYROIDISM, UNSPECIFIED TYPE: ICD-10-CM

## 2020-09-15 LAB
ANION GAP SERPL CALCULATED.3IONS-SCNC: 13 MEQ/L (ref 9–15)
BUN BLDV-MCNC: 15 MG/DL (ref 8–23)
CALCIUM SERPL-MCNC: 9.3 MG/DL (ref 8.5–9.9)
CHLORIDE BLD-SCNC: 97 MEQ/L (ref 95–107)
CO2: 26 MEQ/L (ref 20–31)
CREAT SERPL-MCNC: 1.05 MG/DL (ref 0.7–1.2)
GFR AFRICAN AMERICAN: >60
GFR NON-AFRICAN AMERICAN: >60
GLUCOSE BLD-MCNC: 271 MG/DL (ref 70–99)
POTASSIUM SERPL-SCNC: 4.5 MEQ/L (ref 3.4–4.9)
SODIUM BLD-SCNC: 136 MEQ/L (ref 135–144)
T4 FREE: 1.65 NG/DL (ref 0.84–1.68)
TSH SERPL DL<=0.05 MIU/L-ACNC: 1.13 UIU/ML (ref 0.44–3.86)

## 2020-10-07 ENCOUNTER — NURSE ONLY (OUTPATIENT)
Dept: ENDOCRINOLOGY | Age: 80
End: 2020-10-07
Payer: MEDICARE

## 2020-10-07 PROCEDURE — 90694 VACC AIIV4 NO PRSRV 0.5ML IM: CPT | Performed by: INTERNAL MEDICINE

## 2020-10-07 PROCEDURE — G0008 ADMIN INFLUENZA VIRUS VAC: HCPCS | Performed by: INTERNAL MEDICINE

## 2020-10-07 NOTE — PROGRESS NOTES
Vaccine Information Sheet, \"Influenza - Inactivated\"  given to [employer], or parent/legal guardian of  Zohaib Kruger and verbalized understanding. Patient responses:    Have you ever had a reaction to a flu vaccine? No  Are you able to eat eggs without adverse effects? Yes  Do you have any current illness? No  Have you ever had Guillian Calion Syndrome? No    Flu vaccine given per order. Please see immunization tab.

## 2020-10-09 ENCOUNTER — TELEPHONE (OUTPATIENT)
Dept: INTERNAL MEDICINE CLINIC | Age: 80
End: 2020-10-09

## 2020-10-13 ENCOUNTER — TELEPHONE (OUTPATIENT)
Dept: FAMILY MEDICINE CLINIC | Age: 80
End: 2020-10-13

## 2021-07-09 ENCOUNTER — APPOINTMENT (OUTPATIENT)
Dept: MRI IMAGING | Age: 81
End: 2021-07-09
Payer: MEDICARE

## 2021-07-09 ENCOUNTER — HOSPITAL ENCOUNTER (OUTPATIENT)
Age: 81
Setting detail: OBSERVATION
Discharge: HOME | End: 2021-07-10
Attending: EMERGENCY MEDICINE | Admitting: INTERNAL MEDICINE
Payer: MEDICARE

## 2021-07-09 ENCOUNTER — APPOINTMENT (OUTPATIENT)
Dept: GENERAL RADIOLOGY | Age: 81
End: 2021-07-09
Payer: MEDICARE

## 2021-07-09 ENCOUNTER — APPOINTMENT (OUTPATIENT)
Dept: CT IMAGING | Age: 81
End: 2021-07-09
Payer: MEDICARE

## 2021-07-09 DIAGNOSIS — H81.10 BENIGN PAROXYSMAL POSITIONAL VERTIGO, UNSPECIFIED LATERALITY: Primary | ICD-10-CM

## 2021-07-09 DIAGNOSIS — G45.9 TIA (TRANSIENT ISCHEMIC ATTACK): ICD-10-CM

## 2021-07-09 LAB
ALBUMIN SERPL-MCNC: 4 G/DL (ref 3.5–4.6)
ALP BLD-CCNC: 67 U/L (ref 35–104)
ALT SERPL-CCNC: 28 U/L (ref 0–41)
ANION GAP SERPL CALCULATED.3IONS-SCNC: 10 MEQ/L (ref 9–15)
AST SERPL-CCNC: 17 U/L (ref 0–40)
BASOPHILS ABSOLUTE: 0.1 K/UL (ref 0–0.2)
BASOPHILS RELATIVE PERCENT: 1.2 %
BILIRUB SERPL-MCNC: <0.2 MG/DL (ref 0.2–0.7)
BUN BLDV-MCNC: 17 MG/DL (ref 8–23)
CALCIUM SERPL-MCNC: 9.3 MG/DL (ref 8.5–9.9)
CHLORIDE BLD-SCNC: 96 MEQ/L (ref 95–107)
CO2: 26 MEQ/L (ref 20–31)
CREAT SERPL-MCNC: 1.08 MG/DL (ref 0.7–1.2)
EOSINOPHILS ABSOLUTE: 0.3 K/UL (ref 0–0.7)
EOSINOPHILS RELATIVE PERCENT: 2.9 %
GFR AFRICAN AMERICAN: >60
GFR AFRICAN AMERICAN: >60
GFR NON-AFRICAN AMERICAN: 58
GFR NON-AFRICAN AMERICAN: >60
GLOBULIN: 3.4 G/DL (ref 2.3–3.5)
GLUCOSE BLD-MCNC: 116 MG/DL (ref 60–115)
GLUCOSE BLD-MCNC: 141 MG/DL (ref 60–115)
GLUCOSE BLD-MCNC: 153 MG/DL (ref 60–115)
GLUCOSE BLD-MCNC: 184 MG/DL (ref 60–115)
GLUCOSE BLD-MCNC: 194 MG/DL (ref 70–99)
HBA1C MFR BLD: 6.7 % (ref 4.8–5.9)
HCT VFR BLD CALC: 43.1 % (ref 42–52)
HEMOGLOBIN: 14.4 G/DL (ref 14–18)
INR BLD: 0.8
LV EF: 60 %
LVEF MODALITY: NORMAL
LYMPHOCYTES ABSOLUTE: 3.6 K/UL (ref 1–4.8)
LYMPHOCYTES RELATIVE PERCENT: 38 %
MCH RBC QN AUTO: 29.9 PG (ref 27–31.3)
MCHC RBC AUTO-ENTMCNC: 33.5 % (ref 33–37)
MCV RBC AUTO: 89.3 FL (ref 80–100)
MONOCYTES ABSOLUTE: 1.4 K/UL (ref 0.2–0.8)
MONOCYTES RELATIVE PERCENT: 15.2 %
NEUTROPHILS ABSOLUTE: 4 K/UL (ref 1.4–6.5)
NEUTROPHILS RELATIVE PERCENT: 42.7 %
PDW BLD-RTO: 14.2 % (ref 11.5–14.5)
PERFORMED ON: ABNORMAL
PLATELET # BLD: 252 K/UL (ref 130–400)
POC CREATININE: 1.2 MG/DL (ref 0.8–1.3)
POC SAMPLE TYPE: ABNORMAL
POTASSIUM SERPL-SCNC: 4.1 MEQ/L (ref 3.4–4.9)
PROTHROMBIN TIME: 11.6 SEC (ref 12.3–14.9)
RBC # BLD: 4.82 M/UL (ref 4.7–6.1)
SODIUM BLD-SCNC: 132 MEQ/L (ref 135–144)
TOTAL PROTEIN: 7.4 G/DL (ref 6.3–8)
WBC # BLD: 9.4 K/UL (ref 4.8–10.8)

## 2021-07-09 PROCEDURE — 99285 EMERGENCY DEPT VISIT HI MDM: CPT

## 2021-07-09 PROCEDURE — 70450 CT HEAD/BRAIN W/O DYE: CPT

## 2021-07-09 PROCEDURE — 36415 COLL VENOUS BLD VENIPUNCTURE: CPT

## 2021-07-09 PROCEDURE — G0378 HOSPITAL OBSERVATION PER HR: HCPCS

## 2021-07-09 PROCEDURE — 93005 ELECTROCARDIOGRAM TRACING: CPT

## 2021-07-09 PROCEDURE — 93306 TTE W/DOPPLER COMPLETE: CPT

## 2021-07-09 PROCEDURE — 80053 COMPREHEN METABOLIC PANEL: CPT

## 2021-07-09 PROCEDURE — 83036 HEMOGLOBIN GLYCOSYLATED A1C: CPT

## 2021-07-09 PROCEDURE — 2580000003 HC RX 258: Performed by: EMERGENCY MEDICINE

## 2021-07-09 PROCEDURE — 71045 X-RAY EXAM CHEST 1 VIEW: CPT

## 2021-07-09 PROCEDURE — 70496 CT ANGIOGRAPHY HEAD: CPT

## 2021-07-09 PROCEDURE — 70551 MRI BRAIN STEM W/O DYE: CPT

## 2021-07-09 PROCEDURE — 6360000004 HC RX CONTRAST MEDICATION: Performed by: EMERGENCY MEDICINE

## 2021-07-09 PROCEDURE — 97112 NEUROMUSCULAR REEDUCATION: CPT

## 2021-07-09 PROCEDURE — 6370000000 HC RX 637 (ALT 250 FOR IP): Performed by: INTERNAL MEDICINE

## 2021-07-09 PROCEDURE — 6370000000 HC RX 637 (ALT 250 FOR IP): Performed by: EMERGENCY MEDICINE

## 2021-07-09 PROCEDURE — 6360000002 HC RX W HCPCS: Performed by: INTERNAL MEDICINE

## 2021-07-09 PROCEDURE — 97162 PT EVAL MOD COMPLEX 30 MIN: CPT

## 2021-07-09 PROCEDURE — 85610 PROTHROMBIN TIME: CPT

## 2021-07-09 PROCEDURE — 70498 CT ANGIOGRAPHY NECK: CPT

## 2021-07-09 PROCEDURE — 85025 COMPLETE CBC W/AUTO DIFF WBC: CPT

## 2021-07-09 RX ORDER — SODIUM CHLORIDE 9 MG/ML
INJECTION, SOLUTION INTRAVENOUS CONTINUOUS
Status: DISCONTINUED | OUTPATIENT
Start: 2021-07-09 | End: 2021-07-09

## 2021-07-09 RX ORDER — DEXTROSE MONOHYDRATE 50 MG/ML
100 INJECTION, SOLUTION INTRAVENOUS PRN
Status: DISCONTINUED | OUTPATIENT
Start: 2021-07-09 | End: 2021-07-10 | Stop reason: HOSPADM

## 2021-07-09 RX ORDER — DEXTROSE MONOHYDRATE 25 G/50ML
12.5 INJECTION, SOLUTION INTRAVENOUS PRN
Status: DISCONTINUED | OUTPATIENT
Start: 2021-07-09 | End: 2021-07-10 | Stop reason: HOSPADM

## 2021-07-09 RX ORDER — WATER FOR INJ.,BACTERIOSTATIC
VIAL (ML) INJECTION
Status: DISCONTINUED
Start: 2021-07-09 | End: 2021-07-09 | Stop reason: WASHOUT

## 2021-07-09 RX ORDER — LISINOPRIL AND HYDROCHLOROTHIAZIDE 20; 12.5 MG/1; MG/1
1 TABLET ORAL DAILY
Status: DISCONTINUED | OUTPATIENT
Start: 2021-07-09 | End: 2021-07-10 | Stop reason: HOSPADM

## 2021-07-09 RX ORDER — ROSUVASTATIN CALCIUM 20 MG/1
40 TABLET, COATED ORAL NIGHTLY
Status: DISCONTINUED | OUTPATIENT
Start: 2021-07-09 | End: 2021-07-10 | Stop reason: HOSPADM

## 2021-07-09 RX ORDER — NICOTINE POLACRILEX 4 MG
15 LOZENGE BUCCAL PRN
Status: DISCONTINUED | OUTPATIENT
Start: 2021-07-09 | End: 2021-07-10 | Stop reason: HOSPADM

## 2021-07-09 RX ORDER — CLOPIDOGREL BISULFATE 75 MG/1
75 TABLET ORAL DAILY
Status: DISCONTINUED | OUTPATIENT
Start: 2021-07-09 | End: 2021-07-10 | Stop reason: HOSPADM

## 2021-07-09 RX ORDER — MECLIZINE HYDROCHLORIDE 25 MG/1
25 TABLET ORAL ONCE
Status: COMPLETED | OUTPATIENT
Start: 2021-07-09 | End: 2021-07-09

## 2021-07-09 RX ORDER — LEVOTHYROXINE SODIUM 137 UG/1
1 TABLET ORAL DAILY
Status: DISCONTINUED | OUTPATIENT
Start: 2021-07-09 | End: 2021-07-09 | Stop reason: CLARIF

## 2021-07-09 RX ORDER — ASPIRIN 81 MG/1
81 TABLET ORAL DAILY
Status: DISCONTINUED | OUTPATIENT
Start: 2021-07-09 | End: 2021-07-10

## 2021-07-09 RX ORDER — ONDANSETRON 4 MG/1
4 TABLET, ORALLY DISINTEGRATING ORAL EVERY 8 HOURS PRN
Status: DISCONTINUED | OUTPATIENT
Start: 2021-07-09 | End: 2021-07-10 | Stop reason: HOSPADM

## 2021-07-09 RX ORDER — ONDANSETRON 2 MG/ML
4 INJECTION INTRAMUSCULAR; INTRAVENOUS EVERY 6 HOURS PRN
Status: DISCONTINUED | OUTPATIENT
Start: 2021-07-09 | End: 2021-07-10 | Stop reason: HOSPADM

## 2021-07-09 RX ORDER — LABETALOL HYDROCHLORIDE 5 MG/ML
10 INJECTION, SOLUTION INTRAVENOUS EVERY 10 MIN PRN
Status: DISCONTINUED | OUTPATIENT
Start: 2021-07-09 | End: 2021-07-10 | Stop reason: HOSPADM

## 2021-07-09 RX ADMIN — IOPAMIDOL 100 ML: 755 INJECTION, SOLUTION INTRAVENOUS at 02:54

## 2021-07-09 RX ADMIN — LEVOTHYROXINE SODIUM 137 MCG: 0.03 TABLET ORAL at 08:39

## 2021-07-09 RX ADMIN — ASPIRIN 81 MG: 81 TABLET, COATED ORAL at 08:38

## 2021-07-09 RX ADMIN — SODIUM CHLORIDE: 9 INJECTION, SOLUTION INTRAVENOUS at 03:07

## 2021-07-09 RX ADMIN — CLOPIDOGREL BISULFATE 75 MG: 75 TABLET ORAL at 08:39

## 2021-07-09 RX ADMIN — ROSUVASTATIN CALCIUM 40 MG: 20 TABLET, FILM COATED ORAL at 21:45

## 2021-07-09 RX ADMIN — INSULIN LISPRO 1 UNITS: 100 INJECTION, SOLUTION INTRAVENOUS; SUBCUTANEOUS at 17:08

## 2021-07-09 RX ADMIN — MECLIZINE HYDROCHLORIDE 25 MG: 25 TABLET ORAL at 03:07

## 2021-07-09 ASSESSMENT — ENCOUNTER SYMPTOMS
WHEEZING: 0
ABDOMINAL DISTENTION: 0
CHEST TIGHTNESS: 0
PHOTOPHOBIA: 0
COUGH: 0
SHORTNESS OF BREATH: 0
ABDOMINAL PAIN: 0
EYE DISCHARGE: 0
SORE THROAT: 0
VOMITING: 0

## 2021-07-09 ASSESSMENT — PAIN SCALES - GENERAL: PAINLEVEL_OUTOF10: 0

## 2021-07-09 NOTE — PROGRESS NOTES
Hospitalist Progress Note      PCP: No primary care provider on file. Date of Admission: 7/9/2021    Chief Complaint:  No acute events, afebrile, stable HD    Medications:  Reviewed    Infusion Medications    dextrose       Scheduled Medications    enoxaparin  40 mg Subcutaneous Daily    rosuvastatin  40 mg Oral Nightly    clopidogrel  75 mg Oral Daily    aspirin  81 mg Oral Daily    insulin lispro  0-6 Units Subcutaneous TID WC    insulin lispro  0-3 Units Subcutaneous Nightly    levothyroxine  137 mcg Oral Daily     PRN Meds: ondansetron **OR** ondansetron, labetalol, glucose, dextrose, glucagon (rDNA), dextrose      Intake/Output Summary (Last 24 hours) at 7/9/2021 1259  Last data filed at 7/9/2021 0527  Gross per 24 hour   Intake 200 ml   Output    Net 200 ml       Exam:    BP (!) 140/68   Pulse 65   Temp 97.9 °F (36.6 °C) (Oral)   Resp 16   Ht 6' (1.829 m)   Wt 242 lb 12.8 oz (110.1 kg)   SpO2 99%   BMI 32.93 kg/m²     General appearance: appears stated age and cooperative. Respiratory:  clear to auscultation, bilaterally   Cardiovascular: Regular rate and rhythm, S1/S2. Abdomen: Soft, active bowel sounds. Musculoskeletal: No edema bilaterally. Labs:   Recent Labs     07/09/21  0245   WBC 9.4   HGB 14.4   HCT 43.1        Recent Labs     07/09/21  0242 07/09/21  0245   NA  --  132*   K  --  4.1   CL  --  96   CO2  --  26   BUN  --  17   CREATININE 1.2 1.08   CALCIUM  --  9.3     Recent Labs     07/09/21  0245   AST 17   ALT 28   BILITOT <0.2   ALKPHOS 67     Recent Labs     07/09/21  0245   INR 0.8     No results for input(s): Penelope Lowers in the last 72 hours.     Urinalysis:      Lab Results   Component Value Date    NITRU Negative 08/17/2018    BLOODU neg 09/08/2020    BLOODU Negative 08/17/2018    SPECGRAV 1.020 09/08/2020    SPECGRAV 1.033 08/17/2018    GLUCOSEU neg 09/08/2020    GLUCOSEU 500 08/17/2018       Radiology:  XR CHEST PORTABLE   Final Result   NO ACUTE CARDIOPULMONARY DISEASE. CTA HEAD W WO CONTRAST   Final Result   [NEGATIVE CTA HEAD.]               All CT scans at this facility use dose modulation, iterative reconstruction, and/or weight based dosing when appropriate to reduce radiation dose to as low as reasonably achievable. CTA NECK W WO CONTRAST   Final Result   [NEGATIVE CTA NECK.]            All CT scans at this facility use dose modulation, iterative reconstruction, and/or weight based dosing when appropriate to reduce radiation dose to as low as reasonably achievable. CT HEAD WO CONTRAST   Final Result   Impression:      Mild to moderate cerebral atrophy. Chronic ischemic white matter disease. Above findings discussed via telephone with Sera Roche in the emergency department at [address] hours, Friday, July 9, 2021. All CT scans at this facility use dose modulation, iterative reconstruction, and/or weight based dosing when appropriate to reduce radiation dose to as low as reasonably achievable.       MRI brain without contrast    (Results Pending)           Assessment/Plan:    80 y/o man with history of CVA, HTN, DM2, obesity, RODRIGO who presented with:    Sudden onset of dizziness  - similar in presentation to prior CVA  - NIHSS was 0  - MRI and CTA of the head/neck were negative  - on ASA,Plavix, statin therapy  - neurology consulted  - continue PT/OT    NIDDM2 with hyperglycemia  - on ISS    HTN  - resumed home meds          Electronically signed by Alyx Martinez MD on 7/9/2021 at 12:59 PM

## 2021-07-09 NOTE — FLOWSHEET NOTE
1130-- A&Ox4. PERRLA. Equal strength. Follows commands. No headache or blurred vision. No slurred speech, numbness or tingling. NIHSS 0. No SOB/dypsnea, lungs are clear with diminished bases. Denies chest pain. Abdomen is rounded, soft and non-tender with active bowels x4. Denies pain when urinating. No edema. Several crusted over skin lesions on left forearm and left side of neck. Call light within reach, pt up independently with steady gait.

## 2021-07-09 NOTE — PROGRESS NOTES
[facility]   Facility/Department: [address] [place] [address] [address]  Speech Language Pathology    Sweta Schraderper Day  1940  [id number]    Date: 7/9/2021      Speech Therapy attempted to see Rolando Espinal A Day on this date for a/an:    Clinical Bedside Swallow Evaluation and Speech Language Evaluation    Pt was unable to be seen due to: Other: Patient's family members just arrived prior to SLP. Per their request, to re-attempt in AM.        Electronically signed by Lon Chinchilla.  Harshil Potter SLP on 7/9/21 at 2:42 PM EDT

## 2021-07-09 NOTE — PROGRESS NOTES
1/24/2019    SEPTOPLASTY, MICRODEBRIDER ASSISTED TURBINOPLASTY AND OUT-FRACTURING BILATERAL NASAL ENDOSCOPY performed by Oneal Harding MD at [facility]       Chart Reviewed: Yes  Family / Caregiver Present: Yes (spouse)  General Comment  Comments: Pt resting in bed - agreeable to PT evaluation    Restrictions:  Restrictions/Precautions: Fall Risk     SUBJECTIVE: Subjective: \"I'm not feeling as dizzy as before. \"    Pain  Pre Treatment Pain Screening  Pain at present: 0    Post Treatment Pain Screening:   Pain Assessment  Pain Level:  (denies)    Prior Level of Function:  Social/Functional History  Lives With: Spouse  Type of Home: House  Home Layout: Two level  Home Access: Stairs to enter with rails  Entrance Stairs - Number of Steps: 4-5  Entrance Stairs - Rails: Right  Bathroom Shower/Tub: Tub/Shower unit  Home Equipment: Cane  ADL Assistance: Independent  Ambulation Assistance: Independent ([address] in community; intermittent furniture surfing in home)  Transfer Assistance: Independent  Additional Comments: Pt admits that since his stroke in 2015, he hasn't been able to walk as fast as he would like to. Often he feels \"unbalanced\" or unsure of himself on his feet. OBJECTIVE:   Vision:  (Impaired convergence - L eye predominantly; Smooth pursuits with mild saccadic corrections; mildly discoordinated VORX1)  Vision Exceptions: Wears glasses for reading  Hearing: Within functional limits    Cognition:  Overall Orientation Status: Within Functional Limits  Follows Commands: Within Functional Limits    Observation/Palpation  Observation: No acute distress noted. Pleasant and motivated. ROM:  RLE PROM: WFL  LLE PROM: WFL    Strength:  Strength RLE  Strength RLE: WFL  Strength LLE  Strength LLE: WFL    Neuro:  Balance  Sitting - Static: Good  Sitting - Dynamic: Good  Standing - Static: Good  Standing - Dynamic: Fair  Comments: Pt challenged by DGI tasks. LOB noted with walking - horizontal head turns.  45/56 Dominic Distel balance score. Love Balance Test:  1. Sitting to Standing: Able to stand without using hands and stabilize independently  2. Standing Unsupported: Able to stand safely for 2 minutes  3. Sitting with Back Unsupported but Feet Supported on Floor or on a Stool: Able to sit safely and securely for 2 minutes  4. Standing to Sitting: Sits safely with minimal use of hands  5. Transfers: Able to transfer safely with minor use of hands  6. Standing Unsupported with Eyes Closed: Able to stand 10 seconds safely  7. Standing Unsupported with Feet Together: Able to place feet together independently and stand 1 minute safely  8. Reach Forward with Outstretched Arm While Standing: Can reach forward confidently 25 cm (10 inches)  9.  Object from Floor from a Standing Position: Able to  slipper but needs supervision  10. Turning to Look Behind Over Left and Right Shoulders While Standing: Looks behind from both sides and weight shifts well  11. Turn 360 Degrees: Able to turn 360 degrees safely but slowly  12. Place Alternate Foot on Step or Stool While Standing Unsupported: Able to complete greater than 2 steps needs minimal assist  13. Standing Unsupported One Foot in Front: Able to take small step independently and hold 30 seconds  14. Standing on One Leg: Tries to lift leg unable to hold 3 seconds but remains standing independently  Love Balance Score: 45      Motor Control  Gross Motor?: WFL  Coordination  Finger to Nose:  (slow to complete)  Sensation  Overall Sensation Status: WFL    Bed mobility  Supine to Sit: Modified independent  Sit to Supine: Modified independent    Transfers  Sit to Stand: Supervision  Stand to sit: Supervision  Bed to Chair: Supervision  Comment: Slow completion    Ambulation  Ambulation?: Yes  Ambulation 1  Device: No Device  Assistance: Supervision  Quality of Gait: Slow pacing with shortened steps and minimal foot clearance bilaterally. Rigid posturing and holding of upper trunk. Absent arm swing  Distance: 25ft within room              Activity Tolerance  Activity Tolerance: Patient Tolerated treatment well    Exercises  Comments: Educated on seated VORX1 and horizontal smooth pursuits. HEP handout provided. 30sec X 3reps, 2 sessions a day. Pt demonstrates understanding. PT Education  PT Education: Goals;PT Role;Plan of Care    ASSESSMENT:   Body structures, Functions, Activity limitations: Decreased balance;Decreased endurance;Decreased vision/visual deficit; Decreased functional mobility ; Decreased coordination  Decision Making: Medium Complexity  History: Med  Exam: Med  Clinical Presentation: Med    Prognosis: Good  Barriers to Learning: none at this time    DISCHARGE RECOMMENDATIONS:  Discharge Recommendations: Continue to assess pending progress, Patient would benefit from continued therapy after discharge    Assessment: Continued PT indicated to progress mobility and faciltiate DC at highest level of indep and safety. BPPV assessment unremarkable with supine logroll, sidelying and DH tests negative for symptoms or nystagmus. Further assessment revealing potential sensory integration dysfunction leading to discoordination and balance impairement. Pt with impaired occulomotor exam and struggles with gaze stability during mobility tasks. Rec f/u vestibular therapy for continued balance and mobility training. REQUIRES PT FOLLOW UP: Yes      PLAN OF CARE:  Plan  Times per week: 2-3 visits total  Current Treatment Recommendations: Balance Training, Functional Mobility Training, Transfer Training, Stair training, Gait Training, Endurance Training, Neuromuscular Re-education, Safety Education & Training  Safety Devices  Type of devices:  All fall risk precautions in place    Goals:  Long term goals  Long term goal 1: Pt to complete DGI >19/24  Long term goal 2: Pt to complete transfers indep  Long term goal 3: Pt to ambulate 50-150ft with LRD indep  Long term goal 4: Pt to manage flight of steps with HR and indep    [facility] (6 CLICK) BASIC MOBILITY  AM-PAC Inpatient Mobility Raw Score : 24     Therapy Time:   Individual   Time In 0950   Time Out 1025   Minutes 35   Timed Code Treatment Minutes: 8 Minutes (NMR)       Sita Richardson, PT, 07/09/21 at 1:57 PM         Definitions for assistance levels  Independent = pt does not require any physical supervision or assistance from another person for activity completion. Device may be needed.   Stand by assistance = pt requires verbal cues or instructions from another person, close to but not touching, to perform the activity  Minimal assistance= pt performs 75% or more of the activity; assistance is required to complete the activity  Moderate assistance= pt performs 50% of the activity; assistance is required to complete the activity  Maximal assistance = pt performs 25% of the activity; assistance is required to complete the activity  Dependent = pt requires total physical assistance to accomplish the task

## 2021-07-09 NOTE — H&P
Mylo Leyden, MD at [facility]       Medications Prior to Admission:    Prior to Admission medications    Medication Sig Start Date End Date Taking? Authorizing Provider   lisinopril-hydroCHLOROthiazide (PRINZIDE;ZESTORETIC) 20-12.5 MG per tablet TAKE 1 TABLET BY MOUTH ONCE DAILY 9/14/20   Gayla Huizar MD   levothyroxine (SYNTHROID) 137 MCG tablet TAKE 1 TABLET BY MOUTH ONCE DAILY 9/14/20   Gayla Huizar MD   metFORMIN (GLUCOPHAGE) 1000 MG tablet TAKE 1 TABLET BY MOUTH TWICE DAILY WITH MEALS 9/14/20   Gayla Huizar MD   aspirin 81 MG tablet Take 81 mg by mouth daily    Historical Provider, MD   fluticasone (FLONASE) 50 MCG/ACT nasal spray 2 sprays by Nasal route daily 11/13/18   Jeff Nuno MD       Allergies:  Patient has no known allergies. Social History:   TOBACCO:   reports that he quit smoking about 41 years ago. He started smoking about 52 years ago. He has a 20.00 pack-year smoking history. He has never used smokeless tobacco.  ETOH:   reports current alcohol use. OCCUPATION:none    Family History:       Problem Relation Age of Onset    Early Death Mother     Cancer Mother         lung cancer    No Known Problems Father     No Known Problems Daughter        REVIEW OF SYSTEMS:  Ten systems reviewed and negative except for as above. Physical Exam:    Vitals: BP (!) 168/73   Pulse 84   Temp 98.4 °F (36.9 °C) (Oral)   Resp 18   Ht 6' (1.829 m)   Wt 242 lb 12.8 oz (110.1 kg)   SpO2 98%   BMI 32.93 kg/m²   Constitutional: alert, appears stated age and cooperative  Skin: Skin color, texture, turgor normal. No rashes or lesions  Eyes:Eye: Normal external eye, conjunctiva, CONRAD. ENT: Head: Normocephalic, no lesions, without obvious abnormality.   Neck: no adenopathy, no carotid bruit, no JVD, supple, symmetrical, trachea midline and thyroid not enlarged, symmetric, no tenderness/mass/nodules  Respiratory: clear to auscultation bilaterally  Cardiovascular: regular rate and rhythm, S1, S2 normal, no murmur, click, rub or gallop  Gastrointestinal: soft, non-tender; bowel sounds normal; no masses,  no organomegaly  Genitourinary: Deferred  Musculoskeletal:extremities normal, atraumatic, no cyanosis or edema  Neurologic: Mental status AAOx3 No facial asymmetry or droop. Normal muscle strength b/l. NIH stroke scale 0  Psychiatric: Appropriate mood and affect. Good insight and judgement  Hematologic: No obvious bruising or bleeding    Recent Labs     07/09/21  0245   WBC 9.4   HGB 14.4        Recent Labs     07/09/21  0245   *   K 4.1   CL 96   CO2 26   BUN 17   CREATININE 1.08   GLUCOSE 194*   AST 17   ALT 28   BILITOT <0.2   ALKPHOS 67   INR:   Recent Labs     07/09/21 0245   INR 0.8     URINALYSIS:No results for input(s): NITRITE, COLORU, PHUR, LABCAST, WBCUA, RBCUA, MUCUS, TRICHOMONAS, YEAST, BACTERIA, CLARITYU, SPECGRAV, LEUKOCYTESUR, UROBILINOGEN, BILIRUBINUR, BLOODU, GLUCOSEU, AMORPHOUS in the last 72 hours. Invalid input(s): Gael Ventura  -----------------------------------------------------------------   CTA HEAD W WO CONTRAST    Result Date: 7/9/2021  CTA HEAD WITH INTRAVENOUS CONTRAST MEDIUM. CLINICAL HISTORY:  Code bat. COMPARISON:  None. TECHNIQUE: CTA head with intravenous contrast medium obtained and formatted as contiguous axial images. Thin cut, overlap, 3-D MIP, sagittal, and coronal reconstruction obtained during postprocessing. INTRAVENOUS CONTRAST MEDIUM: Isovue-300, 100 mL FINDINGS: Anterior communicating artery:[Patent].  Right anterior cerebral artery: [A1 segment patent.] [A2 segment patent.] Left anterior cerebral artery: [A1 segment patent.] [A2 segment patent.] Right internal carotid artery: [Communicating segment patent.] Left internal carotid artery: [Communicating segments patent.] Right middle cerebral artery :[M1 segment patent.] [M2 segment patent.] Left middle cerebral artery: [M1 segment patent.] [M2 segment patent.] Right posterior communicating artery: [Congenitally absent.] Left posterior communicating artery: [Congenitally absent.] Persistent fetal circulation: [Identified.] Right posterior cerebral artery: [P1 segment patent.] [P2 segment patent.] Left posterior cerebral artery: [P1 segment patent.] [P2 segment patent.] Basilar tip and basilar artery: [Patent.]     [NEGATIVE CTA HEAD.] All CT scans at this facility use dose modulation, iterative reconstruction, and/or weight based dosing when appropriate to reduce radiation dose to as low as reasonably achievable. CT HEAD WO CONTRAST    Result Date: 7/9/2021  CT Brain. Contrast medium:  without contrast.. History:  Code bat. Technical factors: CT imaging of the brain was obtained and formatted as 5 mm contiguous axial images. 2.5 mm contiguous axial images were obtained through the osseous structures. Sagittal and coronal reconstruction obtained during postprocessing. Comparison:  CT brain, August 22, 2018. Findings: Extra-axial spaces:  Normal. Intracranial hemorrhage:  None. Ventricular system: Ventricles mildly to moderately enlarged. Sulci mildly to moderately prominent. Basal Cisterns:  Normal. Cerebral Parenchyma: Bilateral symmetric periventricular areas decreased attenuation. Midline Shift:  None. Cerebellum:  Normal. Paranasal sinuses and mastoid air cells:  Normal. Visualized Orbits:  Normal.     Impression: Mild to moderate cerebral atrophy. Chronic ischemic white matter disease. Above findings discussed via telephone with Vinod Ambriz in the emergency department at [address] hours, Friday, July 9, 2021. All CT scans at this facility use dose modulation, iterative reconstruction, and/or weight based dosing when appropriate to reduce radiation dose to as low as reasonably achievable. CTA NECK W WO CONTRAST    Result Date: 7/9/2021  EXAMINATION: CTA NECK WITH INTRAVENOUS CONTRAST MEDIUM. CLINICAL HISTORY: Stroke symptoms. Code bat.  COMPARISON:   None  TECHNIQUE: CTA neck obtained and formatted as contiguous axial images from aortic arch to skull base. Thin cut, overlap, 3-D MIP, sagittal, coronal, right and left anterior oblique reconstruction obtained during postprocessing. Study done in conjunction with CTA neck, reported separately. Intravenous Contrast Medium: Isovue-300, 100 mL FINDINGS:  RIGHT CAROTID: Right common carotid artery: [Arises from right brachiocephalic trunk. Normal in course and caliber]. Right carotid bifurcation: [Patent. Nonocclusive small calcification at periphery of distal right common carotid artery. Right internal carotid artery: [Cervical, petrous, lacerum, clinoid, cavernous, and communicating segments patent.] LEFT CAROTID: Left common carotid artery: [Arises from aortic arch. Normal in course and caliber.] Left carotid bifurcation: [Patent.] Left internal carotid artery:[Cervical, petrous, lacerum, clinoid, cavernous, and communicating segments patent.] RIGHT VERTEBRAL: Right vertebral artery arises from right subclavian artery. Pre foraminal, foraminal, extradural, and intradural segments patent. Calcification identified within intradural segment. LEFT VERTEBRAL: Left vertebral artery arises from left subclavian artery. Pre foraminal, foraminal, extradural, and intradural segments patent. Calcification identified within intradural segment. Left vertebral artery dominant. [NEGATIVE CTA NECK.] All CT scans at this facility use dose modulation, iterative reconstruction, and/or weight based dosing when appropriate to reduce radiation dose to as low as reasonably achievable. XR CHEST PORTABLE    Result Date: 7/9/2021  EXAMINATION: XR CHEST PORTABLE CLINICAL HISTORY: CODE BAT. COMPARISONS: CHEST CT, AUGUST 14, 2018, CHEST RADIOGRAPH, MAY 31, 2015 FINDINGS: Osseous structures are intact. Cardiac pericardial silhouette is normal. Pulmonary vasculature is normal. Lungs are clear. NO ACUTE CARDIOPULMONARY DISEASE.       EKG: Normal sinus rhythm    Assessment and Plan   1.  Acute vertigo: Seemingly improved not completely resolved, patient still has some mild vertiginous symptoms when turning his head to the right although [place] is negative for any nystagmus. NIH stroke scale 0. MRI brain. Echocardiogram with bubble study. Consult to allergy. PT OT ST evaluation. Check lipids and hemoglobin A1c. Statin. Dual antiplatelet therapy. Check TSH. 2. Type 2 diabetes: Sliding scale insulin hold Metformin check hemoglobin A1c  3. Hypertension: Permissive hypertension  4. Hyperlipidemia: Statin, dual antiplatelet therapy and check lipids. 5. 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 [facility] admit 08/17/18.  R26.9    Alcohol use Z72.89    Deviated nasal septum J34.2    Hypertrophy of nasal turbinates J34.3    Obstructive sleep apnea (adult) (pediatric) G47.33    Skin ulcer of forearm ([address] L98.499    TIA (transient ischemic attack) G45.9       Holli Mail, DO  Admitting Hospitalist    Emergency Contact:

## 2021-07-09 NOTE — CARE COORDINATION
[facility] AT [place] Case Management Initial Discharge Assessment    Met with Patient AND WIFE to discuss discharge plan. PCP: No primary care provider on file. Date of Last Visit: NO PCP  PATIENT DOES NOT WANT A LIST OF PCPS. PATIENT STATES HE SEES DR. Liana Hdz AND DR. Liana Hdz MANAGES ALL HIS MEDICATIONS. If no PCP, list provided? N/A    Discharge Planning    Living Arrangements: independently at home    Who do you live with? WIFE      Who helps you with your care:  self or spouse    If lives at home:     Do you have any barriers navigating in your home? no    Patient can perform ADL? Yes    Current Services (outpatient and in home) :  None    Dialysis: No    Is transportation available to get to your appointments? Yes    DME Equipment:  yes - HAS A CANE    Respiratory equipment: None    Respiratory provider:  no     Pharmacy:  yes - [address] with Medication Assistance Program?  No      Patient agreeable to [address]? No and Declined    Patient agreeable to SNF/Rehab? No and Declined    Other discharge needs identified? Other AGREEABLE TO OUTPT VESTIBULAR REHAB IF INDICATED    Does Patient Have a High-Risk for Readmission Diagnosis (CHF, PN, MI, COPD)? No      Initial Discharge Plan? (Note: please see concurrent daily documentation for any updates after initial note). PLANS TO RETURN HOME W/ WIFE. DENIES ANY NEEDS.  AGREEABLE TO OUTPT PT VESTIBULAR THERAPY IF ORDERED     Readmission Risk              Risk of Unplanned Readmission:  0         Electronically signed by Dagmar Rios RN on 7/9/2021 at 1:07 PM

## 2021-07-09 NOTE — PROGRESS NOTES
[facility]   Facility/Department: [address] [place] [address] [address]  Speech Language Pathology    Raegan Fritz Day  1940  [id number]    Date: 7/9/2021      Speech Therapy attempted to see Kym Sierra A Day on this date for a/an:    Clinical Bedside Swallow Evaluation and Speech Language Evaluation    Pt was unable to be seen due to:   Patient is currently off unit. SLP arrived in room and began bedside swallow evaluation. Patient reports no new onset of swallowing difficulty and that he did fine with his lunch of regular solids and thin liquids. Before assessment of swallow could begin, transport arrived to take patient to MRI. Patient and wife at bedside asked if ST could come back after patients children visit (3pm) or wait until tomorrow AM. SLP to re-attempt as able.          Electronically signed by TONY De León on 7/9/21 at 1:05 PM EDT

## 2021-07-09 NOTE — ED PROVIDER NOTES
[address] [facility] ED  eMERGENCY dEPARTMENT eNCOUnter      Pt Name: Zohaib Kruger  MRN: [id number]  Nohemigfdemetrice 1940  Date of evaluation: 7/9/2021  Provider: Rosa Mora MD    CHIEF COMPLAINT       Chief Complaint   Patient presents with    Cerebrovascular Accident         HISTORY OF PRESENT ILLNESS   (Location/Symptom, Timing/Onset,Context/Setting, Quality, Duration, Modifying Factors, Severity)  Note limiting factors. Zohaib Kruger is a 79 y.o. male who presents to the emergency department for evaluation of possible stroke. Patient reports prior to bed he felt a little bit dizzy but decided to lay down anyway. He woke up at 2 in the morning to use the bathroom and the dizziness became worse. He describes such profound vertigo that he was unable to stand on his own. No associated nausea or vomiting. No headache. He presents via private vehicle stating that this feels similar to a stroke he had in 2015. As he goes into more details of that he states he had the dizziness but also had significant right-sided weakness at that time and required TPA. His symptoms had completely resolved and he follows with Dr. Ashutosh Smith. So his symptoms tonight are similar in that he has the dizziness or vertigo but no associated weakness. He has no slurred speech or confusion. His wife accompanies him and agrees. No related chest pain or shortness of breath. He takes aspirin daily. HPI    NursingNotes were reviewed. REVIEW OF SYSTEMS    (2-9 systems for level 4, 10 or more for level 5)     Review of Systems   Constitutional: Negative for chills and diaphoresis. HENT: Negative for congestion, ear pain, mouth sores and sore throat. Eyes: Negative for photophobia and discharge. Respiratory: Negative for cough, chest tightness, shortness of breath and wheezing. Cardiovascular: Negative for chest pain and palpitations. Gastrointestinal: Negative for abdominal distention, abdominal pain and vomiting. Endocrine: Negative for cold intolerance. Genitourinary: Negative for difficulty urinating. Musculoskeletal: Negative for arthralgias. Skin: Negative for pallor and rash. Allergic/Immunologic: Negative for immunocompromised state. Neurological: Positive for dizziness. Negative for syncope. Hematological: Negative for adenopathy. Psychiatric/Behavioral: Negative for agitation and hallucinations. All other systems reviewed and are negative. Except as noted above the remainder of the review of systems was reviewed and negative. PAST MEDICAL HISTORY     Past Medical History:   Diagnosis Date    Acute CVA (cerebrovascular accident) ([facility] [address] 6/3/2015    Left lacunar in centrum semi-ovale got TPA 5-31-15 Dr Ani Munoz Hyperlipidemia     on meds x 3 yrs    Hypertension     on meds x 3 yrs    Hypothyroidism     Obstructive sleep apnea     does not use CPAP    Type II or unspecified type diabetes mellitus without mention of complication, not stated as uncontrolled          SURGICALHISTORY       Past Surgical History:   Procedure Laterality Date    DENTAL SURGERY      SEPTOPLASTY Bilateral 1/24/2019    SEPTOPLASTY, MICRODEBRIDER ASSISTED TURBINOPLASTY AND OUT-FRACTURING BILATERAL NASAL ENDOSCOPY performed by Macy Mckay MD at [facility]       Previous Medications    ASPIRIN 81 MG TABLET    Take 81 mg by mouth daily    FLUTICASONE (FLONASE) 50 MCG/ACT NASAL SPRAY    2 sprays by Nasal route daily    LEVOTHYROXINE (SYNTHROID) 137 MCG TABLET    TAKE 1 TABLET BY MOUTH ONCE DAILY    LISINOPRIL-HYDROCHLOROTHIAZIDE (PRINZIDE;ZESTORETIC) 20-12.5 MG PER TABLET    TAKE 1 TABLET BY MOUTH ONCE DAILY    METFORMIN (GLUCOPHAGE) 1000 MG TABLET    TAKE 1 TABLET BY MOUTH TWICE DAILY WITH MEALS       ALLERGIES     Patient has no known allergies.     FAMILY HISTORY       Family History   Problem Relation Age of Onset    Early Death Mother     Cancer Mother         lung cancer    No Known Problems Father     No Known Problems Daughter           SOCIAL HISTORY       Social History     Socioeconomic History    Marital status:      Spouse name: Not on file    Number of children: Not on file    Years of education: Not on file    Highest education level: Not on file   Occupational History    Not on file   Tobacco Use    Smoking status: Former Smoker     Packs/day: 1.00     Years: 20.00     Pack years: 20.00     Start date: 10/9/1968     Quit date:      Years since quittin.5    Smokeless tobacco: Never Used   Vaping Use    Vaping Use: Never used   Substance and Sexual Activity    Alcohol use: Yes     Comment: socially    Drug use: No    Sexual activity: Not Currently     Partners: Female   Other Topics Concern    Not on file   Social History Narrative         Lives With: Spouse    Type of Home: House    Home Layout: Two level (20 with hand rails)    Home Access: Stairs to enter with rails    Entrance Stairs - Number of Steps: 3    Entrance Stairs - Rails: Right    Bathroom Shower/Tub: Walk-in shower    Bathroom Equipment: Shower chair    Home Equipment:  (no DME)    ADL Assistance: Independent    Homemaking Assistance: Independent    Homemaking Responsibilities: Yes (shared)    Ambulation Assistance: Independent    Transfer Assistance: Independent     Social Determinants of Health     Financial Resource Strain:     Difficulty of Paying Living Expenses:    Food Insecurity:     Worried About Running Out of Food in the Last Year:     [address] in the Last Year:    Transportation Needs:     Lack of Transportation (Medical):      Lack of Transportation (Non-Medical):    Physical Activity:     Days of Exercise per Week:     Minutes of Exercise per Session:    Stress:     Feeling of Stress :    Social Connections:     Frequency of Communication with Friends and Family:     Frequency of Social Gatherings with Friends and Family:     Attends Mandaen Services:     Active Member of Clubs or Organizations:     Attends Club or Organization Meetings:     Marital Status:    Intimate Partner Violence:     Fear of Current or Ex-Partner:     Emotionally Abused:     Physically Abused:     Sexually Abused:        SCREENINGS   NIH Stroke Scale  Interval: Baseline  Level of Consciousness (1a. ): Alert  LOC Questions (1b. ): Answers both correctly  LOC Commands (1c. ): Performs both tasks correctly  Best Gaze (2. ): Normal  Visual (3. ): No visual loss  Facial Palsy (4. ): Normal symmetrical movement  Motor Arm, Left (5a. ): No drift  Motor Arm, Right (5b. ): No drift  Motor Leg, Left (6a. ): No drift  Motor Leg, Right (6b. ): No drift  Limb Ataxia (7. ): Absent  Sensory (8. ): Normal  Extinction and Inattention (11): No abnormality  @FLOW([id number])@      PHYSICAL EXAM    (up to 7 for level 4, 8 or more for level 5)     ED Triage Vitals   BP Temp Temp src Pulse Resp SpO2 Height Weight   -- -- -- -- -- -- -- --       Physical Exam  Vitals and nursing note reviewed. Constitutional:       Appearance: He is well-developed. HENT:      Head: Normocephalic. Nose: Nose normal.      Mouth/Throat:      Mouth: Mucous membranes are moist.   Eyes:      Conjunctiva/sclera: Conjunctivae normal.      Pupils: Pupils are equal, round, and reactive to light. Cardiovascular:      Rate and Rhythm: Normal rate and regular rhythm. Heart sounds: Normal heart sounds. Pulmonary:      Effort: Pulmonary effort is normal.      Breath sounds: Normal breath sounds. Abdominal:      General: Bowel sounds are normal.      Palpations: Abdomen is soft. Tenderness: There is no abdominal tenderness. There is no guarding. Musculoskeletal:         General: Normal range of motion. Cervical back: Normal range of motion and neck supple. Skin:     General: Skin is warm and dry. Capillary Refill: Capillary refill takes less than 2 seconds.    Neurological:      Mental Status: He is alert and oriented to person, place, and time. GCS: GCS eye subscore is 4. GCS verbal subscore is 5. GCS motor subscore is 6. Cranial Nerves: Cranial nerves are intact. Sensory: Sensation is intact. Motor: Motor function is intact. No weakness or tremor. Coordination: Finger-Nose-Finger Test normal.      Comments: Patient was not ambulated because of his vertigo. Psychiatric:         Mood and Affect: Mood normal.         DIAGNOSTIC RESULTS     EKG: All EKG's are interpreted by the Emergency Department Physician who either signs or Co-signsthis chart in the absence of a cardiologist.    EKG shows a left bundle branch block pattern. Abnormal ST-T wave segments secondary to the left bundle branch. Leftward axis deviation. Abnormal EKG. RADIOLOGY:   Non-plain filmimages such as CT, Ultrasound and MRI are read by the radiologist. Plain radiographic images are visualized and preliminarily interpreted by the emergency physician with the below findings:        Interpretation per the Radiologist below, if available at the time ofthis note:    XR CHEST PORTABLE   Final Result   NO ACUTE CARDIOPULMONARY DISEASE. CTA HEAD W WO CONTRAST   Final Result   [NEGATIVE CTA HEAD.]               All CT scans at this facility use dose modulation, iterative reconstruction, and/or weight based dosing when appropriate to reduce radiation dose to as low as reasonably achievable. CTA NECK W WO CONTRAST   Final Result   [NEGATIVE CTA NECK.]            All CT scans at this facility use dose modulation, iterative reconstruction, and/or weight based dosing when appropriate to reduce radiation dose to as low as reasonably achievable. CT HEAD WO CONTRAST   Final Result   Impression:      Mild to moderate cerebral atrophy. Chronic ischemic white matter disease.       Above findings discussed via telephone with Cindy Riggins in the emergency department at [address] hours, Friday, July 9, 2021. All CT scans at this facility use dose modulation, iterative reconstruction, and/or weight based dosing when appropriate to reduce radiation dose to as low as reasonably achievable. ED BEDSIDE ULTRASOUND:   Performed by ED Physician - none    LABS:  Labs Reviewed   CBC WITH AUTO DIFFERENTIAL - Abnormal; Notable for the following components:       Result Value    Monocytes Absolute 1.4 (*)     All other components within normal limits   PROTIME-INR - Abnormal; Notable for the following components:    Protime 11.6 (*)     All other components within normal limits   COMPREHENSIVE METABOLIC PANEL       All other labs were within normal range or not returned as of this dictation. EMERGENCY DEPARTMENT COURSE and DIFFERENTIAL DIAGNOSIS/MDM:   Vitals:    Vitals:    07/09/21 0314   BP: (!) 178/87   Pulse: 84   Resp: 20   Temp: 98.1 °F (36.7 °C)   SpO2: 96%   Weight: 242 lb 12.8 oz (110.1 kg)   Height: 6' (1.829 m)        MDM patient has vertigo but no other focal neurologic deficits. Based on his similar symptoms with past CVA he will be admitted for further observation. The case was discussed with his neurologist Dr. Georgia Yanes. CONSULTS:  None    PROCEDURES:  Unless otherwise noted below, none     Procedures    FINAL IMPRESSION      1. Benign paroxysmal positional vertigo, unspecified laterality    2. TIA (transient ischemic attack)          DISPOSITION/PLAN   DISPOSITION        PATIENT REFERRED TO:  No follow-up provider specified.     DISCHARGE MEDICATIONS:  New Prescriptions    No medications on file          (Please note that portions of this note were completed with a voice recognition program.  Efforts were made to edit the dictations but occasionally words are mis-transcribed.)    Shayne Mcginnis MD (electronically signed)  Attending Emergency Physician          Shayne Mcginnis MD  07/09/21 0234       Shayne Mcginnis MD  07/09/21 8388

## 2021-07-09 NOTE — ED TRIAGE NOTES
Patient states SX are the same as when he had a stroke in 2015. States was sleeping woke and tried to get up. States felt weak like he was going to fall down. Unsure when SX started. Told nurse he may have had a few SX prior to going to bed.

## 2021-07-09 NOTE — PLAN OF CARE
Therapy evaluation completed. Please see daily notes and/or progress notes for details related to planned treatment interventions, goals and functional performance. walker

## 2021-07-09 NOTE — PROGRESS NOTES
[facility]   Facility/Department: [address] [place] [address] [address]  Speech Language Pathology    Jyotsna Ganong Day  1940  [id number]    Date: 7/9/2021      Speech Therapy attempted to see Nuvia Courtney A Day on this date for a/an:    Clinical Bedside Swallow Evaluation and Speech Language Evaluation    Pt was unable to be seen due to: Other: Patient currently receiving echo. SLP to re-attempt as able.          Electronically signed by TONY Funk on 7/9/21 at 11:26 AM EDT

## 2021-07-09 NOTE — PLAN OF CARE
Problem: HEMODYNAMIC STATUS  Goal: Patient has stable vital signs and fluid balance  Outcome: Ongoing     Problem: ACTIVITY INTOLERANCE/IMPAIRED MOBILITY  Goal: Mobility/activity is maintained at optimum level for patient  Outcome: Ongoing     Problem: COMMUNICATION IMPAIRMENT  Goal: Ability to express needs and understand communication  Outcome: Ongoing     Problem: Falls - Risk of:  Goal: Will remain free from falls  Outcome: Ongoing  Goal: Absence of physical injury  Outcome: Ongoing

## 2021-07-10 VITALS
TEMPERATURE: 97.9 F | WEIGHT: 242.8 LBS | BODY MASS INDEX: 32.89 KG/M2 | SYSTOLIC BLOOD PRESSURE: 159 MMHG | OXYGEN SATURATION: 98 % | HEIGHT: 72 IN | RESPIRATION RATE: 16 BRPM | HEART RATE: 64 BPM | DIASTOLIC BLOOD PRESSURE: 64 MMHG

## 2021-07-10 LAB
ANION GAP SERPL CALCULATED.3IONS-SCNC: 10 MEQ/L (ref 9–15)
BASOPHILS ABSOLUTE: 0.1 K/UL (ref 0–0.2)
BASOPHILS RELATIVE PERCENT: 0.9 %
BUN BLDV-MCNC: 14 MG/DL (ref 8–23)
CALCIUM SERPL-MCNC: 9.1 MG/DL (ref 8.5–9.9)
CHLORIDE BLD-SCNC: 103 MEQ/L (ref 95–107)
CHOLESTEROL, TOTAL: 191 MG/DL (ref 0–199)
CO2: 27 MEQ/L (ref 20–31)
CREAT SERPL-MCNC: 0.99 MG/DL (ref 0.7–1.2)
EOSINOPHILS ABSOLUTE: 0.3 K/UL (ref 0–0.7)
EOSINOPHILS RELATIVE PERCENT: 3.6 %
GFR AFRICAN AMERICAN: >60
GFR NON-AFRICAN AMERICAN: >60
GLUCOSE BLD-MCNC: 134 MG/DL (ref 70–99)
GLUCOSE BLD-MCNC: 139 MG/DL (ref 60–115)
GLUCOSE BLD-MCNC: 235 MG/DL (ref 60–115)
HBA1C MFR BLD: 6.6 % (ref 4.8–5.9)
HCT VFR BLD CALC: 40.7 % (ref 42–52)
HDLC SERPL-MCNC: 36 MG/DL (ref 40–59)
HEMOGLOBIN: 13.6 G/DL (ref 14–18)
LDL CHOLESTEROL CALCULATED: 134 MG/DL (ref 0–129)
LYMPHOCYTES ABSOLUTE: 1.9 K/UL (ref 1–4.8)
LYMPHOCYTES RELATIVE PERCENT: 27.1 %
MCH RBC QN AUTO: 29.7 PG (ref 27–31.3)
MCHC RBC AUTO-ENTMCNC: 33.4 % (ref 33–37)
MCV RBC AUTO: 88.8 FL (ref 80–100)
MONOCYTES ABSOLUTE: 1.1 K/UL (ref 0.2–0.8)
MONOCYTES RELATIVE PERCENT: 15.3 %
NEUTROPHILS ABSOLUTE: 3.7 K/UL (ref 1.4–6.5)
NEUTROPHILS RELATIVE PERCENT: 53.1 %
PDW BLD-RTO: 14.3 % (ref 11.5–14.5)
PERFORMED ON: ABNORMAL
PERFORMED ON: ABNORMAL
PLATELET # BLD: 231 K/UL (ref 130–400)
POTASSIUM REFLEX MAGNESIUM: 4.2 MEQ/L (ref 3.4–4.9)
RBC # BLD: 4.59 M/UL (ref 4.7–6.1)
SODIUM BLD-SCNC: 140 MEQ/L (ref 135–144)
TRIGL SERPL-MCNC: 106 MG/DL (ref 0–150)
TROPONIN: <0.01 NG/ML (ref 0–0.01)
WBC # BLD: 7 K/UL (ref 4.8–10.8)

## 2021-07-10 PROCEDURE — 92523 SPEECH SOUND LANG COMPREHEN: CPT

## 2021-07-10 PROCEDURE — 36415 COLL VENOUS BLD VENIPUNCTURE: CPT

## 2021-07-10 PROCEDURE — 6370000000 HC RX 637 (ALT 250 FOR IP): Performed by: INTERNAL MEDICINE

## 2021-07-10 PROCEDURE — 92610 EVALUATE SWALLOWING FUNCTION: CPT

## 2021-07-10 PROCEDURE — 83036 HEMOGLOBIN GLYCOSYLATED A1C: CPT

## 2021-07-10 PROCEDURE — 80048 BASIC METABOLIC PNL TOTAL CA: CPT

## 2021-07-10 PROCEDURE — 85025 COMPLETE CBC W/AUTO DIFF WBC: CPT

## 2021-07-10 PROCEDURE — 99222 1ST HOSP IP/OBS MODERATE 55: CPT | Performed by: PSYCHIATRY & NEUROLOGY

## 2021-07-10 PROCEDURE — G0378 HOSPITAL OBSERVATION PER HR: HCPCS

## 2021-07-10 PROCEDURE — 80061 LIPID PANEL: CPT

## 2021-07-10 PROCEDURE — 84484 ASSAY OF TROPONIN QUANT: CPT

## 2021-07-10 RX ORDER — ROSUVASTATIN CALCIUM 40 MG/1
40 TABLET, COATED ORAL NIGHTLY
Qty: 30 TABLET | Refills: 3 | Status: SHIPPED | OUTPATIENT
Start: 2021-07-10 | End: 2021-09-14 | Stop reason: ALTCHOICE

## 2021-07-10 RX ORDER — CLOPIDOGREL BISULFATE 75 MG/1
75 TABLET ORAL DAILY
Qty: 30 TABLET | Refills: 3 | Status: SHIPPED | OUTPATIENT
Start: 2021-07-11 | End: 2021-09-14 | Stop reason: SDUPTHER

## 2021-07-10 RX ADMIN — CLOPIDOGREL BISULFATE 75 MG: 75 TABLET ORAL at 08:02

## 2021-07-10 RX ADMIN — LEVOTHYROXINE SODIUM 137 MCG: 0.03 TABLET ORAL at 09:06

## 2021-07-10 RX ADMIN — LISINOPRIL AND HYDROCHLOROTHIAZIDE 1 TABLET: 12.5; 2 TABLET ORAL at 08:02

## 2021-07-10 RX ADMIN — INSULIN LISPRO 2 UNITS: 100 INJECTION, SOLUTION INTRAVENOUS; SUBCUTANEOUS at 12:06

## 2021-07-10 RX ADMIN — ASPIRIN 81 MG: 81 TABLET, COATED ORAL at 08:02

## 2021-07-10 ASSESSMENT — ENCOUNTER SYMPTOMS
TROUBLE SWALLOWING: 0
BACK PAIN: 0
NAUSEA: 0
COLOR CHANGE: 0
SHORTNESS OF BREATH: 0
PHOTOPHOBIA: 0
VOMITING: 0
CHOKING: 0

## 2021-07-10 NOTE — PROGRESS NOTES
[facility]   Facility/Department: [facility] [address] Lama  Speech Language Pathology  Initial Speech/Language/Cognitive Assessment    NAME:Nikolas Kruger  : 1940 (76 y.o.)   MRN: [id number]  [id number]  ADMISSION DATE: 2021  PATIENT DIAGNOSIS(ES): TIA (transient ischemic attack) [G45.9]  TIA (transient ischemic attack) [G45.9]  Chief Complaint   Patient presents with    Cerebrovascular Accident     Patient Active Problem List    Diagnosis Date Noted    TIA (transient ischemic attack) 2021    Skin ulcer of forearm ([facility] [address] 2020    Deviated nasal septum 2019    Hypertrophy of nasal turbinates 2019    Obstructive sleep apnea (adult) (pediatric) 2019    Alcohol use 2018    Late effects of CVA (cerebrovascular accident) 2018    Gait abnormality dt TBI -acute rehab admission 2018    Closed fracture of nasal bone with routine healing 2018    High risk medication use-Ultram 2018    Abnormality of 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   [facility] admit 18. 2018    Post-traumatic subdural hematoma (HCC) 2018    Impaired mobility and activities of daily living 2015    Type 2 diabetes mellitus with hyperglycemia, with long-term current use of insulin ([facility] [address] 2012    Hypothyroidism 2012    Hypertension 2012    Hyperlipidemia 2012     Past Medical History:   Diagnosis Date    Acute CVA (cerebrovascular accident) ([facility] [address] 6/3/2015    Left lacunar in centrum semi-ovale got TPA 5-31-15 Dr Julien Davis Hyperlipidemia     on meds x 3 yrs    Hypertension     on meds x 3 yrs    Hypothyroidism     Obstructive sleep apnea     does not use CPAP    Type II or unspecified type diabetes mellitus without mention of complication, not stated as uncontrolled      Past Surgical History:   Procedure Laterality Date    DENTAL SURGERY      SEPTOPLASTY Bilateral 1/24/2019    SEPTOPLASTY, MICRODEBRIDER ASSISTED TURBINOPLASTY AND OUT-FRACTURING BILATERAL NASAL ENDOSCOPY performed by Everett Lainez MD at [address] [place] [address] Street: 7/9/2021    Date of Evaluation: 7/10/2021   Evaluating Therapist: Salbador Sheikh SLP      Assessment:  Cognitive Diagnosis: Pt presents with cognitive abilities that are [facility] at this time. Pt demonstrated minor difficulty with problem solving related to medication management, however, pt reports that wife at home helps him as needed. Pt does not require follow-up from  at this time. Please reconsult if new issues arise. Diagnosis: Cognitive-linguistic skills are WFL at this time. Recommendations:  Requires SLP Intervention: No  Duration/Frequency of Treatment: No f/u cog/lang  D/C Recommendations: To be determined        Goals:    N/A  Patient's goals: Pt was involved with the creation of POC    Subjective:   Previous level of function and limitations: See below  General  Chart Reviewed: Yes  Patient assessed for rehabilitation services?: Yes  Family / Caregiver Present: No  Subjective  Subjective: Pt was alert, cooperative, and pleasant for SLE. Reports no changes since prior to admission. Social/Functional History  Lives With: Spouse  Active : Yes  Vision  Vision: Impaired  Vision Exceptions: Wears glasses for reading  Hearing  Hearing: Within functional limits        Objective:     Oral/Motor  Oral Motor: Within functional limits    Auditory Comprehension  Comprehension: Within Functional Limits     Expression  Primary Mode of Expression: Verbal    Verbal Expression  Verbal Expression: Within functional limits    Written Expression  Dominant Hand: Right  Written Expression: Unable to assess    Motor Speech  Motor Speech:  Within Functional Limits    Pragmatics/Social Functioning  Pragmatics: Within functional limits    Cognition:      Orientation  Overall Orientation Status: Within Normal Limits  Attention  Attention: Within Functional Limits  Memory  Memory: Within Funtional Limits  Problem Solving  Problem Solving: Within Functional Limits  Complex Functional Tasks:  [facility])  Managing Medications: Mild  Simple Functional Tasks:  [facility])  Verbal Reasoning Skills:  [facility])  Numeric Reasoning  Numeric Reasoning: Unable to assess  Abstract Reasoning  Abstract Reasoning: Within Functional Limits  Safety/Judgement  Safety/Judgement: Within Functional Limits    Additional Assessments:   N/A          Prognosis:  Individuals consulted  Consulted and agree with results and recommendations: Patient    Education:  Patient Education: Pt was educated on results of SLE  Patient Education Response: Verbalizes understanding  Safety Devices in place: Yes  Type of devices: All fall risk precautions in place;Call light within reach; Bed alarm in place    Pain Assessment:  Pre-Treatment  Pain assessment: 0-10  Pain level: 0  Intervention:  Patient denies pain. Post-Treatment  Pain assessment: 0-10  Pain level: 0  Intervention:  Patient denies pain.       Therapy Time  SLP Individual Minutes  Time In: 1591  Time Out: 1755  Minutes: 8        Signature: Electronically signed by Asuncion Severe, SLP on 7/10/2021 at 9:18 AM

## 2021-07-10 NOTE — DISCHARGE SUMMARY
Hospital Medicine Discharge Summary    Nova Kruger  :  1940  MRN:  [id number]    Admit date:  2021  Discharge date:  7/10/2021    Admitting Physician:  Ventura Luis DO  Primary Care Physician:  No primary care provider on file. Discharge Diagnoses: Active Problems:    TIA (transient ischemic attack)  Resolved Problems:    * No resolved hospital problems. *      Hospital Course:   Nova Kruger is a 79 y.o. 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  - NIHSS was 0  - MRI and CTA of the head/neck were negative  - continued Plavix, statin therapy, stopped ASA, Ok to d/c per neurology    Skin lesions in face and LUE  - advised to f/u with dermatology as outpatient      Patient was seen by the following consultants while admitted to [facility]:   Consults:  [address]    Significant Diagnostic Studies:    Echocardiogram complete 2D with doppler with color    Result Date: 2021  Transthoracic Echocardiography Report (TTE)  Demographics   Patient Name    Dann Escobedo Gender                Male   Patient Number  [id number]     Race                                                  Ethnicity   Visit Number    [id number]    Room Number           W288   Corporate ID                 Date of Study         2021   Accession       [id number]   Referring Physician  Number   Date of Birth   1940   Sonographer           Deepika Garcia   Age             79 year(s)   Interpreting          [facility]) Cardiology                               Physician             Gio Burns  Procedure Type of Study   TTE procedure:ECHO COMPLETE 2D W/DOP W/COLOR. Procedure Date Date: 2021 Start: 10:45 AM Study Location: Portable Indications:TIA.  Patient Status: RVSP:30.54 mmHg  Valves  Mitral Valve   Peak E-Wave: 1.09 m/s                  Peak Gradient: 4.75 mmHg  Mean Velocity: 0.59 m/s                Area (continuity): 2.66 cm^2  Mean Gradient: 1.61 mmHg   Tissue Doppler   E' Septal Velocity: 0.06 m/s  E' Lateral Velocity: 0.06 m/s   Aortic Valve   Peak Velocity: 2.23 m/s            Mean Velocity: 1.63 m/s  Peak Gradient: 19.83 mmHg          Mean Gradient: 11.39 mmHg  Area (continuity): 1.63 cm^2       Area (2D): 1.52 cm^2  AV VTI: 42.68 cm                   Deceleration Time: 1641.3 msec  AI P1/2t: 476 msec  Cusp Separation: 1.01 cm   Tricuspid Valve   Estimated RVSP: 30.54 mmHg              Estimated RAP: 10 mmHg  TR Velocity: 2.27 m/s                   TR Gradient: 20.54 mmHg   Pulmonic Valve   Peak Velocity: 1.14 m/s           Peak Gradient: 5.15 mmHg                                    Estimated PASP: 30.54 mmHg   LVOT   Peak Velocity: 1 m/s                 Mean Velocity: 0.73 m/s  Peak Gradient: 4.04 mmHg             Mean Gradient: 2.36 mmHg  LVOT Diameter: 2.05 cm               LVOT VTI: 21.12 cm  Structures  Left Atrium   LA Dimension: 3.26 cm                        LA Area: 19.07 cm^2  LA/Aorta: 1.12  LA Volume/Index: 71.03 ml /31 m^2   Left Ventricle   Diastolic Dimension: 7.91 cm         Systolic Dimension: 6.96 cm  Septum Diastolic: 9.76 cm            Septum Systolic: 0.41 cm  PW Diastolic: 9.28 cm                PW Systolic: 6.23 cm                                       FS: 33.9 %  LV EDV/LV EDV Index: 90.41 ml/39 m^2 LV ESV/LV ESV Index: 33.56 ml/15 m^2  EF Calculated: 62.9 %                LV Length: 7.54 cm   LVOT Diameter: 2.05 cm   Right Atrium   RA Systolic Pressure: 10 mmHg   Right Ventricle   Diastolic Dimension: 9.51 cm                                    RV Systolic Pressure: 15.30 mmHg  Aorta/ Miscellaneous Aorta   Aortic Root: 2.91 cm  LVOT Diameter: 2.05 cm      CTA HEAD W WO CONTRAST    Result Date: 7/9/2021  CTA HEAD WITH INTRAVENOUS Cerebral Parenchyma: Bilateral symmetric periventricular areas decreased attenuation. Midline Shift:  None. Cerebellum:  Normal. Paranasal sinuses and mastoid air cells:  Normal. Visualized Orbits:  Normal.     Impression: Mild to moderate cerebral atrophy. Chronic ischemic white matter disease. Above findings discussed via telephone with Leonid Talavera in the emergency department at [address] hours, Friday, July 9, 2021. All CT scans at this facility use dose modulation, iterative reconstruction, and/or weight based dosing when appropriate to reduce radiation dose to as low as reasonably achievable. CTA NECK W WO CONTRAST    Result Date: 7/9/2021  EXAMINATION: CTA NECK WITH INTRAVENOUS CONTRAST MEDIUM. CLINICAL HISTORY: Stroke symptoms. Code bat. COMPARISON:   None  TECHNIQUE: CTA neck obtained and formatted as contiguous axial images from aortic arch to skull base. Thin cut, overlap, 3-D MIP, sagittal, coronal, right and left anterior oblique reconstruction obtained during postprocessing. Study done in conjunction with CTA neck, reported separately. Intravenous Contrast Medium: Isovue-300, 100 mL FINDINGS:  RIGHT CAROTID: Right common carotid artery: [Arises from right brachiocephalic trunk. Normal in course and caliber]. Right carotid bifurcation: [Patent. Nonocclusive small calcification at periphery of distal right common carotid artery. Right internal carotid artery: [Cervical, petrous, lacerum, clinoid, cavernous, and communicating segments patent.] LEFT CAROTID: Left common carotid artery: [Arises from aortic arch. Normal in course and caliber.] Left carotid bifurcation: [Patent.] Left internal carotid artery:[Cervical, petrous, lacerum, clinoid, cavernous, and communicating segments patent.] RIGHT VERTEBRAL: Right vertebral artery arises from right subclavian artery. Pre foraminal, foraminal, extradural, and intradural segments patent. Calcification identified within intradural segment.  LEFT VERTEBRAL: Left vertebral artery arises from left subclavian artery. Pre foraminal, foraminal, extradural, and intradural segments patent. Calcification identified within intradural segment. Left vertebral artery dominant. [NEGATIVE CTA NECK.] All CT scans at this facility use dose modulation, iterative reconstruction, and/or weight based dosing when appropriate to reduce radiation dose to as low as reasonably achievable. XR CHEST PORTABLE    Result Date: 7/9/2021  EXAMINATION: XR CHEST PORTABLE CLINICAL HISTORY: CODE BAT. COMPARISONS: CHEST CT, AUGUST 14, 2018, CHEST RADIOGRAPH, MAY 31, 2015 FINDINGS: Osseous structures are intact. Cardiac pericardial silhouette is normal. Pulmonary vasculature is normal. Lungs are clear. NO ACUTE CARDIOPULMONARY DISEASE.     MRI brain without contrast    Result Date: 7/9/2021  EXAMINATION:  MRI BRAIN WO CONTRAST HISTORY:   stroke tia vs vertigo  TECHNIQUE:  MRI brain routine protocol without contrast. COMPARISON:  CT brain on CTA head and neck 7/9/2021 and CT brain 8/22/2018 RESULT: Acute Change:  No evidence of an acute intracranial process. Hemorrhage:  No evidence of prior parenchymal hemorrhage on the susceptibility weighted sequences. Mass Lesion/ Mass Effect:  No evidence of an intracranial mass or extra-axial fluid collection. No significant mass effect. Chronic Change:  Scattered patchy areas of increased T2 and FLAIR signal  are present in the supratentorial white matter which is a nonspecific finding but likely represents mild chronic microvascular ischemia. Sequela of prior infarcts involving the right hemipons on left posterior limb internal capsule. Parenchyma: There is mild generalized parenchymal volume loss. Ventricles:  Ventriculomegaly corresponds to the degree of parenchymal volume loss. Skull Base:  Hypothalamic and pituitary region are grossly normal. Craniocervical junction is normal. No significant marrow replacement process.  Vasculature:  Major intracranial arteries and dural venous sinuses demonstrate typical flow voids, suggesting patency by spin echo criteria. Other:  The paranasal sinuses and mastoid air cells are clear. The orbits and extracranial soft tissues are unremarkable. No acute intracranial process; no acute infarct or intracranial hemorrhage. Sequela of prior infarcts and chronic microvascular ischemia. Discharge Medications:       Day, [facility] Medication Instructions BBR:[id number]    Printed on:07/10/21 3402   Medication Information                      clopidogrel (PLAVIX) 75 MG tablet  Take 1 tablet by mouth daily             fluticasone (FLONASE) 50 MCG/ACT nasal spray  2 sprays by Nasal route daily             levothyroxine (SYNTHROID) 137 MCG tablet  TAKE 1 TABLET BY MOUTH ONCE DAILY             lisinopril-hydroCHLOROthiazide (PRINZIDE;ZESTORETIC) 20-12.5 MG per tablet  TAKE 1 TABLET BY MOUTH ONCE DAILY             metFORMIN (GLUCOPHAGE) 1000 MG tablet  TAKE 1 TABLET BY MOUTH TWICE DAILY WITH MEALS             rosuvastatin (CRESTOR) 40 MG tablet  Take 1 tablet by mouth nightly                 Disposition:   Discharged to Home. Any [facility] needs that were indicated and/or required as been addressed and set up by Social Work. Condition at discharge: Pt was medically stable at the time of discharge. Activity: activity as tolerated, fall precautions. Total time taken for discharging this patient: 40 minutes. Greater than 70% of time was spent focused exclusively on this patient. Time was taken to review chart, discuss plans with consultants, reconciling medications, discussing plan answering questions with patient. Signed:  Heidi Gibbons MD  7/10/2021, 12:55 PM  ----------------------------------------------------------------------------------------------------------------------    Johnnie Kruger,     Please return to ER or call 911 if you develop any significant signs or symptoms.      I may not have addressed all of your medical illnesses or the abnormal blood work or imaging therefore please ask your PCP No primary care provider on file. and other out patient specialists and providers  to obtain [facility] record entirely to follow up on all of the abnormal labs, imaging and findings that I have and have not addressed during your hospitalization. Discharging you from the hospital does not mean that your medical care ends here and now. You may still need additional work up, investigation, monitoring, and treatment to be handled from this point on by outside providers including your PCP, No primary care provider on file. , Specialists and other healthcare providers. Please review your list of discharge medications prior to resuming medications you might still have at home, as the medications you need to be taking, dosages or how often you must take them may have changed. For medication questions, contact your retail pharmacy and your PCP, No primary care provider on file. Lila Taylor I STRONGLY RECOMMEND that you follow up with No primary care provider on file. within 3 to 5 days for a post hospitalization evaluation. This specific office visit is covered by your insurance, and is not the same as your annual doctor visit/ check up. This office visit is important, as it may prevent need for repeat and/or future hospitalizations. **    Your medical team at [facility] ([facility]) appreciates the opportunity to work with you to get well!     Sincerely,  Jesse Sharma MD

## 2021-07-10 NOTE — CONSULTS
Subjective:      Patient ID: Maribel Kruger is a 79 y.o. male who presents today for dizziness    HPI 73-year-old right-handed gentleman admitted with a sudden dizziness patient reported dizziness getting out of bed. Was quite dizzy and very scared as this was presentation when he last had a stroke which we treated with TPA with complete resolution. I have not seen him since. 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 Last seen he was on Plavix and his skin looked purple and he was somewhat bruising. Only residuals are those of ataxia when seen. We recommended every other day Plavix though is only on aspirin now. Patient was dizzy in the emergency room we were contacted for reevaluation of TPA though he had no other symptoms and this appeared to be mostly vestibular. She also has developed a skin lesion on his cheek for the last 6 months has not been seen by anyone and may be a melanoma. His diabetes and does have other infective processes that occurs on and off. Review of Systems   Constitutional: Negative for fever. HENT: Negative for ear pain, tinnitus and trouble swallowing. Eyes: Negative for photophobia and visual disturbance. Respiratory: Negative for choking and shortness of breath. Cardiovascular: Negative for chest pain and palpitations. Gastrointestinal: Negative for nausea and vomiting. Musculoskeletal: Negative for back pain, gait problem, joint swelling, myalgias, neck pain and neck stiffness. Skin: Negative for color change. Allergic/Immunologic: Negative for food allergies. Neurological: Negative for dizziness, tremors, seizures, syncope, facial asymmetry, speech difficulty, weakness, light-headedness, numbness and headaches. Psychiatric/Behavioral: Negative for behavioral problems, confusion, hallucinations and sleep disturbance.        Past Medical History:   Diagnosis Date    Acute CVA (cerebrovascular accident) ([facility] [address] 6/3/2015    Left lacunar in centrum semi-ovale got TPA 5-31-15 Dr Vince Hoang Hyperlipidemia     on meds x 3 yrs    Hypertension     on meds x 3 yrs    Hypothyroidism     Obstructive sleep apnea     does not use CPAP    Type II or unspecified type diabetes mellitus without mention of complication, not stated as uncontrolled      Past Surgical History:   Procedure Laterality Date    DENTAL SURGERY      SEPTOPLASTY Bilateral 2019    SEPTOPLASTY, MICRODEBRIDER ASSISTED TURBINOPLASTY AND OUT-FRACTURING BILATERAL NASAL ENDOSCOPY performed by Osiel Flaherty MD at [address] [place] [address] History     Socioeconomic History    Marital status:      Spouse name: Not on file    Number of children: Not on file    Years of education: Not on file    Highest education level: Not on file   Occupational History    Not on file   Tobacco Use    Smoking status: Former Smoker     Packs/day: 1.00     Years: 20.00     Pack years: 20.00     Start date: 10/9/1968     Quit date:      Years since quittin.5    Smokeless tobacco: Never Used   Vaping Use    Vaping Use: Never used   Substance and Sexual Activity    Alcohol use: Yes     Comment: socially    Drug use: No    Sexual activity: Not Currently     Partners: Female   Other Topics Concern    Not on file   Social History Narrative         Lives With: Spouse    Type of Home: House    Home Layout: Two level (20 with hand rails)    Home Access: Stairs to enter with rails    Entrance Stairs - Number of Steps: 3    Entrance Stairs - Rails: Right    Bathroom Shower/Tub: Walk-in shower    Bathroom Equipment: Shower chair    Home Equipment:  (no DME)    ADL Assistance: Independent    Homemaking Assistance: Independent    Homemaking Responsibilities: Yes (shared)    Ambulation Assistance: Independent    Transfer Assistance: Independent     Social Determinants of Health     Financial Resource Strain:     Difficulty of Paying Living Expenses:    Food Insecurity:     Worried About Running Out of Food in the Last Year:     [address] in the Last Year:    Transportation Needs:     Lack of Transportation (Medical):      Lack of Transportation (Non-Medical):    Physical Activity:     Days of Exercise per Week:     Minutes of Exercise per Session:    Stress:     Feeling of Stress :    Social Connections:     Frequency of Communication with Friends and Family:     Frequency of Social Gatherings with Friends and Family:     Attends Jain Services:     Active Member of Clubs or Organizations:     Attends Club or Organization Meetings:     Marital Status:    Intimate Partner Violence:     Fear of Current or Ex-Partner:     Emotionally Abused:     Physically Abused:     Sexually Abused:      Family History   Problem Relation Age of Onset    Early Death Mother     Cancer Mother         lung cancer    No Known Problems Father     No Known Problems Daughter      No Known Allergies  Current Facility-Administered Medications   Medication Dose Route Frequency Provider Last Rate Last Admin    ondansetron (ZOFRAN-ODT) disintegrating tablet 4 mg  4 mg Oral Q8H PRN Malathi Altamirano Sedar, DO        Or    ondansetron (ZOFRAN) injection 4 mg  4 mg Intravenous Q6H PRN Malathi Vivasar, DO        enoxaparin (LOVENOX) injection 40 mg  40 mg Subcutaneous Daily [place] Sedar, DO        labetalol (NORMODYNE;TRANDATE) injection 10 mg  10 mg Intravenous Q10 Min PRN [place] Sedar, DO        rosuvastatin (CRESTOR) tablet 40 mg  40 mg Oral Nightly Lio CARCAMO Sedar, DO   40 mg at 07/09/21 2145    clopidogrel (PLAVIX) tablet 75 mg  75 mg Oral Daily Lio CARCAMO Sedar, DO   75 mg at 07/10/21 0802    aspirin EC tablet 81 mg  81 mg Oral Daily Lio CARCAMO Sedar, DO   81 mg at 07/10/21 0802    glucose (GLUTOSE) 40 % oral gel 15 g  15 g Oral PRN Malathi Altamirano Sedar, DO        dextrose 50 % IV solution  12.5 g Intravenous PRN Malathi Altamirano Sedar, DO        glucagon (rDNA) injection 1 mg  1 mg Intramuscular PRN Samanta Spry Sedar, DO        dextrose 5 % solution  100 mL/hr Intravenous PRN Samanta Spry Sedar, DO        insulin lispro (HUMALOG) injection vial 0-6 Units  0-6 Units Subcutaneous TID WC Lio CARCAMO Sedar, DO   1 Units at 07/09/21 1708    insulin lispro (HUMALOG) injection vial 0-3 Units  0-3 Units Subcutaneous Nightly Samanta Hinton Sedar, DO   1 Units at 07/09/21 2150    levothyroxine (SYNTHROID) tablet 137 mcg  137 mcg Oral Daily Samanta Spry Sedar, DO   137 mcg at 07/10/21 0906    lisinopril-hydroCHLOROthiazide (PRINZIDE;ZESTORETIC) 20-12.5 MG per tablet 1 tablet  1 tablet Oral Daily Tegan Franz MD   1 tablet at 07/10/21 0802        Objective:   BP (!) 159/64   Pulse 64   Temp 97.9 °F (36.6 °C)   Resp 16   Ht 6' (1.829 m)   Wt 242 lb 12.8 oz (110.1 kg)   SpO2 98%   BMI 32.93 kg/m²     Physical Exam  Vitals reviewed. Eyes:      Pupils: Pupils are equal, round, and reactive to light. Cardiovascular:      Rate and Rhythm: Normal rate and regular rhythm. Heart sounds: No murmur heard. Pulmonary:      Effort: Pulmonary effort is normal.      Breath sounds: Normal breath sounds. Abdominal:      General: Bowel sounds are normal.   Musculoskeletal:         General: Normal range of motion. Cervical back: Normal range of motion. Skin:     General: Skin is warm. Neurological:      Mental Status: He is alert and oriented to person, place, and time. Cranial Nerves: No cranial nerve deficit. Sensory: No sensory deficit. Motor: No abnormal muscle tone. Coordination: Coordination normal.      Deep Tendon Reflexes: Reflexes are normal and symmetric. Babinski sign absent on the right side. Babinski sign absent on the left side.    Psychiatric:         Mood and Affect: Mood normal.       Large skin lesion notable on his cheek on the left  Echocardiogram complete 2D with doppler with color    Result Date: 7/9/2021  Transthoracic Echocardiography Report (TTE)  Demographics   Patient Name    Mitch Acevedo Gender                Male   Patient Number  [id number]     Race                                                  Ethnicity   Visit Number    [id number]    Room Number           W288   Corporate ID                 Date of Study         07/09/2021   Accession       [id number]   Referring Physician  Number   Date of Birth   1940   Sonographer           JoseDeepika   Age             79 year(s)   Interpreting          [facility]) Cardiology                               Physician             Whitney Soto  Procedure Type of Study   TTE procedure:ECHO COMPLETE 2D W/DOP W/COLOR. Procedure Date Date: 07/09/2021 Start: 10:45 AM Study Location: Portable Indications:TIA. Patient Status: Routine Height: 72 inches Weight: 242 pounds BSA: 2.31 m^2 BMI: 32.82 kg/m^2 BP: 140/68 mmHg  Conclusions   Summary  Normal tricuspid valve structure and function. There is Trace ( 1 +) tricuspid regurgitation with estimated RVSP of 31 mm  Hg. Left ventricular ejection fraction is visually estimated at 60%. Signature   ----------------------------------------------------------------  Electronically signed by Karilyn Pallas Ismail(Interpreting physician)  on 07/09/2021 01:18 PM  ----------------------------------------------------------------   Findings  Left Ventricle Left ventricular ejection fraction is visually estimated at 60%. Right Ventricle Normal right ventricle structure and function. Left Atrium Normal left atrium. Right Atrium Normal right atrium. Mitral Valve Normal mitral valve structure and function. Tricuspid Valve Normal tricuspid valve structure and function. There is Trace ( 1 +) tricuspid regurgitation with estimated RVSP of 31 mm Hg. Aortic Valve Normal aortic valve structure and function. Pulmonic Valve Normal pulmonic valve structure and function. Pericardial Effusion No evidence of pericardial effusion. Pleural Effusion No evidence of pleural effusion.  Aorta \ Miscellaneous Miscellaneous normal findings were found. M-Mode Measurements (cm)   LVIDd: 4.46 cm                         LVIDs: 2.95 cm  IVSd: 1.16 cm                          IVSs: 1.27 cm  LVPWd: 1.09 cm                         LVPWs: 1.41 cm  Rt. Vent.  Dimension: 3.82 cm           AO Root Dimension: 2.91 cm                                         ACS: 1.01 cm                                         LA: 3.26 cm                                         LVOT: 2.05 cm  Doppler Measurements:   AV Velocity:0.02 m/s                    MV Peak E-Wave: 1.09 m/s  AV Peak Gradient: 19.83 mmHg  AV Mean Gradient: 11.39 mmHg  AV Area (Continuity):1.63 cm^2  TR Velocity:2.27 m/s                    Estimated RAP:10 mmHg  TR Gradient:20.54 mmHg                  RVSP:30.54 mmHg  Valves  Mitral Valve   Peak E-Wave: 1.09 m/s                  Peak Gradient: 4.75 mmHg  Mean Velocity: 0.59 m/s                Area (continuity): 2.66 cm^2  Mean Gradient: 1.61 mmHg   Tissue Doppler   E' Septal Velocity: 0.06 m/s  E' Lateral Velocity: 0.06 m/s   Aortic Valve   Peak Velocity: 2.23 m/s            Mean Velocity: 1.63 m/s  Peak Gradient: 19.83 mmHg          Mean Gradient: 11.39 mmHg  Area (continuity): 1.63 cm^2       Area (2D): 1.52 cm^2  AV VTI: 42.68 cm                   Deceleration Time: 1641.3 msec  AI P1/2t: 476 msec  Cusp Separation: 1.01 cm   Tricuspid Valve   Estimated RVSP: 30.54 mmHg              Estimated RAP: 10 mmHg  TR Velocity: 2.27 m/s                   TR Gradient: 20.54 mmHg   Pulmonic Valve   Peak Velocity: 1.14 m/s           Peak Gradient: 5.15 mmHg                                    Estimated PASP: 30.54 mmHg   LVOT   Peak Velocity: 1 m/s                 Mean Velocity: 0.73 m/s  Peak Gradient: 4.04 mmHg             Mean Gradient: 2.36 mmHg  LVOT Diameter: 2.05 cm               LVOT VTI: 21.12 cm  Structures  Left Atrium   LA Dimension: 3.26 cm                        LA Area: 19.07 cm^2  LA/Aorta: 1.12  LA Volume/Index: 71.03 ml /31 m^2   Left Ventricle   Diastolic Dimension: 4.46 cm         Systolic Dimension: 4.55 cm  Septum Diastolic: 9.06 cm            Septum Systolic: 1.07 cm  PW Diastolic: 8.56 cm                PW Systolic: 2.25 cm                                       FS: 33.9 %  LV EDV/LV EDV Index: 90.41 ml/39 m^2 LV ESV/LV ESV Index: 33.56 ml/15 m^2  EF Calculated: 62.9 %                LV Length: 7.54 cm   LVOT Diameter: 2.05 cm   Right Atrium   RA Systolic Pressure: 10 mmHg   Right Ventricle   Diastolic Dimension: 3.42 cm                                    RV Systolic Pressure: 63.28 mmHg  Aorta/ Miscellaneous Aorta   Aortic Root: 2.91 cm  LVOT Diameter: 2.05 cm      CTA HEAD W WO CONTRAST    Result Date: 7/9/2021  CTA HEAD WITH INTRAVENOUS CONTRAST MEDIUM. CLINICAL HISTORY:  Code bat. COMPARISON:  None. TECHNIQUE: CTA head with intravenous contrast medium obtained and formatted as contiguous axial images. Thin cut, overlap, 3-D MIP, sagittal, and coronal reconstruction obtained during postprocessing. INTRAVENOUS CONTRAST MEDIUM: Isovue-300, 100 mL FINDINGS: Anterior communicating artery:[Patent].  Right anterior cerebral artery: [A1 segment patent.] [A2 segment patent.] Left anterior cerebral artery: [A1 segment patent.] [A2 segment patent.] Right internal carotid artery: [Communicating segment patent.] Left internal carotid artery: [Communicating segments patent.] Right middle cerebral artery :[M1 segment patent.] [M2 segment patent.] Left middle cerebral artery: [M1 segment patent.] [M2 segment patent.] Right posterior communicating artery: [Congenitally absent.] Left posterior communicating artery: [Congenitally absent.] Persistent fetal circulation: [Identified.] Right posterior cerebral artery: [P1 segment patent.] [P2 segment patent.] Left posterior cerebral artery: [P1 segment patent.] [P2 segment patent.] Basilar tip and basilar artery: [Patent.]     [NEGATIVE CTA HEAD.] All CT scans at this facility use dose modulation, iterative reconstruction, and/or weight based dosing when appropriate to reduce radiation dose to as low as reasonably achievable. CT HEAD WO CONTRAST    Result Date: 7/9/2021  CT Brain. Contrast medium:  without contrast.. History:  Code bat. Technical factors: CT imaging of the brain was obtained and formatted as 5 mm contiguous axial images. 2.5 mm contiguous axial images were obtained through the osseous structures. Sagittal and coronal reconstruction obtained during postprocessing. Comparison:  CT brain, August 22, 2018. Findings: Extra-axial spaces:  Normal. Intracranial hemorrhage:  None. Ventricular system: Ventricles mildly to moderately enlarged. Sulci mildly to moderately prominent. Basal Cisterns:  Normal. Cerebral Parenchyma: Bilateral symmetric periventricular areas decreased attenuation. Midline Shift:  None. Cerebellum:  Normal. Paranasal sinuses and mastoid air cells:  Normal. Visualized Orbits:  Normal.     Impression: Mild to moderate cerebral atrophy. Chronic ischemic white matter disease. Above findings discussed via telephone with Adrienne Rios in the emergency department at [address] hours, Friday, July 9, 2021. All CT scans at this facility use dose modulation, iterative reconstruction, and/or weight based dosing when appropriate to reduce radiation dose to as low as reasonably achievable. CTA NECK W WO CONTRAST    Result Date: 7/9/2021  EXAMINATION: CTA NECK WITH INTRAVENOUS CONTRAST MEDIUM. CLINICAL HISTORY: Stroke symptoms. Code bat. COMPARISON:   None  TECHNIQUE: CTA neck obtained and formatted as contiguous axial images from aortic arch to skull base. Thin cut, overlap, 3-D MIP, sagittal, coronal, right and left anterior oblique reconstruction obtained during postprocessing. Study done in conjunction with CTA neck, reported separately. Intravenous Contrast Medium: Isovue-300, 100 mL FINDINGS:  RIGHT CAROTID: Right common carotid artery: [Arises from right brachiocephalic trunk. Normal in course and caliber].  Right carotid bifurcation: [Patent. Nonocclusive small calcification at periphery of distal right common carotid artery. Right internal carotid artery: [Cervical, petrous, lacerum, clinoid, cavernous, and communicating segments patent.] LEFT CAROTID: Left common carotid artery: [Arises from aortic arch. Normal in course and caliber.] Left carotid bifurcation: [Patent.] Left internal carotid artery:[Cervical, petrous, lacerum, clinoid, cavernous, and communicating segments patent.] RIGHT VERTEBRAL: Right vertebral artery arises from right subclavian artery. Pre foraminal, foraminal, extradural, and intradural segments patent. Calcification identified within intradural segment. LEFT VERTEBRAL: Left vertebral artery arises from left subclavian artery. Pre foraminal, foraminal, extradural, and intradural segments patent. Calcification identified within intradural segment. Left vertebral artery dominant. [NEGATIVE CTA NECK.] All CT scans at this facility use dose modulation, iterative reconstruction, and/or weight based dosing when appropriate to reduce radiation dose to as low as reasonably achievable. XR CHEST PORTABLE    Result Date: 7/9/2021  EXAMINATION: XR CHEST PORTABLE CLINICAL HISTORY: CODE BAT. COMPARISONS: CHEST CT, AUGUST 14, 2018, CHEST RADIOGRAPH, MAY 31, 2015 FINDINGS: Osseous structures are intact. Cardiac pericardial silhouette is normal. Pulmonary vasculature is normal. Lungs are clear. NO ACUTE CARDIOPULMONARY DISEASE.     MRI brain without contrast    Result Date: 7/9/2021  EXAMINATION:  MRI BRAIN WO CONTRAST HISTORY:   stroke tia vs vertigo  TECHNIQUE:  MRI brain routine protocol without contrast. COMPARISON:  CT brain on CTA head and neck 7/9/2021 and CT brain 8/22/2018 RESULT: Acute Change:  No evidence of an acute intracranial process. Hemorrhage:  No evidence of prior parenchymal hemorrhage on the susceptibility weighted sequences.  Mass Lesion/ Mass Effect:  No evidence of an intracranial mass or extra-axial fluid collection. No significant mass effect. Chronic Change:  Scattered patchy areas of increased T2 and FLAIR signal  are present in the supratentorial white matter which is a nonspecific finding but likely represents mild chronic microvascular ischemia. Sequela of prior infarcts involving the right hemipons on left posterior limb internal capsule. Parenchyma: There is mild generalized parenchymal volume loss. Ventricles:  Ventriculomegaly corresponds to the degree of parenchymal volume loss. Skull Base:  Hypothalamic and pituitary region are grossly normal. Craniocervical junction is normal. No significant marrow replacement process. Vasculature:  Major intracranial arteries and dural venous sinuses demonstrate typical flow voids, suggesting patency by spin echo criteria. Other:  The paranasal sinuses and mastoid air cells are clear. The orbits and extracranial soft tissues are unremarkable. No acute intracranial process; no acute infarct or intracranial hemorrhage. Sequela of prior infarcts and chronic microvascular ischemia.        Lab Results   Component Value Date    WBC 7.0 07/10/2021    RBC 4.59 07/10/2021    HGB 13.6 07/10/2021    HCT 40.7 07/10/2021    MCV 88.8 07/10/2021    MCH 29.7 07/10/2021    MCHC 33.4 07/10/2021    RDW 14.3 07/10/2021     07/10/2021    MPV 9.9 07/07/2015     Lab Results   Component Value Date     07/10/2021    K 4.2 07/10/2021     07/10/2021    CO2 27 07/10/2021    BUN 14 07/10/2021    CREATININE 0.99 07/10/2021    GFRAA >60.0 07/10/2021    LABGLOM >60.0 07/10/2021    GLUCOSE 134 07/10/2021    PROT 7.4 07/09/2021    PROT 6.8 07/27/2015    LABALBU 4.0 07/09/2021    CALCIUM 9.1 07/10/2021    BILITOT <0.2 07/09/2021    ALKPHOS 67 07/09/2021    AST 17 07/09/2021    ALT 28 07/09/2021     Lab Results   Component Value Date    PROTIME 11.6 07/09/2021    INR 0.8 07/09/2021     Lab Results   Component Value Date    TSH 1.130 09/15/2020 Lab Results   Component Value Date    TRIG 106 07/10/2021    HDL 36 07/10/2021    LDLCALC 134 07/10/2021     Lab Results   Component Value Date    LABAMPH Neg 05/31/2015    BARBSCNU Neg 05/31/2015    LABBENZ Neg 05/31/2015    OPIATESCREENURINE Neg 05/31/2015    PHENCYCLIDINESCREENURINE Neg 05/31/2015     No results found for: LITHIUM, DILFRTOT, VALPROATE    Assessment:   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 Is completely recovered. Patient came to the hospital as he had similar symptoms when we last treated him for stroke and he had received TPA 2017. Patient was then seen only in 2019 with residuals mild gait ataxia. Patient sustained a traumatic subdural hematoma at that time which is all resolved. This time recommended short use of antiplatelet agents dual though he must follow-up with me in my office as we will likely discontinue the Plavix or just give 1 Plavix and discontinue the aspirin for now. Keep him only on Plavix and discontinue the aspirin for now. All his studies appear to be normal CT angiograms were normal he is asymptomatic this time. He has a large lesion on his cheek which could be malignancy or melanoma and asked to see dermatology as an outpatient. This consultation includes my consultation with emergency room    Madan Sears MD, Jael Rowe, American Board of Psychiatry & Neurology  Board Certified in Vascular Neurology  Board Certified in Neuromuscular Medicine  Certified in [facility]:

## 2021-07-10 NOTE — PROGRESS NOTES
Nutrition Education    Consult recieved 'For Pt Diet Ed'. Pt admitted with TIA. Pt declined diet education, provided written information regarding lof fat/low cholesterol/low sodium guidelines for stroke prevention. Contact number also provided for questions prior to discharge    · Written educational materials provided. · Contact name and number provided.       Electronically signed by Calin Win RD, LD on 7/10/21 at 9:50 AM EDT

## 2021-07-10 NOTE — PROGRESS NOTES
GLUCOSEU 500 08/17/2018       Radiology:  MRI brain without contrast   Final Result      No acute intracranial process; no acute infarct or intracranial hemorrhage. Sequela of prior infarcts and chronic microvascular ischemia. XR CHEST PORTABLE   Final Result   NO ACUTE CARDIOPULMONARY DISEASE. CTA HEAD W WO CONTRAST   Final Result   [NEGATIVE CTA HEAD.]               All CT scans at this facility use dose modulation, iterative reconstruction, and/or weight based dosing when appropriate to reduce radiation dose to as low as reasonably achievable. CTA NECK W WO CONTRAST   Final Result   [NEGATIVE CTA NECK.]            All CT scans at this facility use dose modulation, iterative reconstruction, and/or weight based dosing when appropriate to reduce radiation dose to as low as reasonably achievable. CT HEAD WO CONTRAST   Final Result   Impression:      Mild to moderate cerebral atrophy. Chronic ischemic white matter disease. Above findings discussed via telephone with Cindy Riggins in the emergency department at [address] hours, Friday, July 9, 2021. All CT scans at this facility use dose modulation, iterative reconstruction, and/or weight based dosing when appropriate to reduce radiation dose to as low as reasonably achievable.               Assessment/Plan:    78 y/o man with history of CVA, HTN, DM2, obesity, RODRIGO who presented with:    Sudden onset of dizziness  - similar in presentation to prior CVA  - NIHSS was 0  - MRI and CTA of the head/neck were negative  - continue Plavix, statin therapy, stopped ASA, Ok to d/c per neurology    Skin lesions in face and LUE  - advised to f/u with dermatology as outpatient    NIDDM2 with hyperglycemia  - on ISS    HTN  - resumed home meds          Electronically signed by Bang Cervantes MD on 7/10/2021 at 12:41 PM

## 2021-07-10 NOTE — PROGRESS NOTES
[facility]   Facility/Department: [facility]  Speech Language Pathology  Clinical Bedside Swallow Evaluation    NAME:Nikolas Kruger  : 1940 (76 y.o.)   MRN: [id number]  [id number]  ADMISSION DATE: 2021  PATIENT DIAGNOSIS(ES): TIA (transient ischemic attack) [G45.9]  TIA (transient ischemic attack) [G45.9]  Chief Complaint   Patient presents with    Cerebrovascular Accident     Patient Active Problem List    Diagnosis Date Noted    TIA (transient ischemic attack) 2021    Skin ulcer of forearm ([facility] 2020    Deviated nasal septum 2019    Hypertrophy of nasal turbinates 2019    Obstructive sleep apnea (adult) (pediatric) 2019    Alcohol use 2018    Late effects of CVA (cerebrovascular accident) 2018    Gait abnormality dt TBI -acute rehab admission 2018    Closed fracture of nasal bone with routine healing 2018    High risk medication use-Ultram 2018    Abnormality of 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   [facility] admit 18. 2018    Post-traumatic subdural hematoma ([facility] 2018    Impaired mobility and activities of daily living 2015    Type 2 diabetes mellitus with hyperglycemia, with long-term current use of insulin ([facility] 2012    Hypothyroidism 2012    Hypertension 2012    Hyperlipidemia 2012     Past Medical History:   Diagnosis Date    Acute CVA (cerebrovascular accident) ([facility] 6/3/2015    Left lacunar in centrum semi-ovale got TPA 5-31-15 Dr Consuelo Oscar Hyperlipidemia     on meds x 3 yrs    Hypertension     on meds x 3 yrs    Hypothyroidism     Obstructive sleep apnea     does not use CPAP    Type II or unspecified type diabetes mellitus without mention of complication, not stated as uncontrolled      Past Surgical History:   Procedure Laterality Date    DENTAL SURGERY      SEPTOPLASTY Bilateral 1/24/2019    SEPTOPLASTY, MICRODEBRIDER ASSISTED TURBINOPLASTY AND OUT-FRACTURING BILATERAL NASAL ENDOSCOPY performed by Mihir Lieberman MD at [facility]     No Known Allergies    DATE ONSET: 7/9/2021    Date of Evaluation: 7/10/2021   Evaluating Therapist: Larisa Kaur, SLP    Recommended Diet and Intervention  Diet Solids Recommendation: Regular  Liquid Consistency Recommendation: Thin  Recommended Form of Meds: PO          Compensatory Swallowing Strategies  Compensatory Swallowing Strategies: Alternate solids and liquids;Small bites/sips;Eat/Feed slowly    Reason for Referral  Lashell De Anda A Day was referred for a bedside swallow evaluation to assess the efficiency of his swallow function, identify signs and symptoms of aspiration and make recommendations regarding safe dietary consistencies, effective compensatory strategies, and safe eating environment. General  Chart Reviewed: Yes  Subjective  Subjective: Pt was alert, cooperative, and pleasant for BSE  Behavior/Cognition: Alert; Cooperative;Pleasant mood  Respiratory Status: Room air  O2 Device: None (Room air)  Dentition: Adequate  Patient Positioning: Upright in bed  Baseline Vocal Quality: Normal  Prior Dysphagia History: BSE completed 8/2018 revealed oropharyngeal phase of swallow WFL with recommended diet of regular consistencies with thin liquids. Current Diet level:  Current Diet : Regular  Current Liquid Diet : Thin    Oral Motor Deficits  Oral/Motor  Oral Motor: Within functional limits    Oral Phase Dysfunction  Oral Phase  Oral Phase: WFL  Oral Phase  Oral Phase - Comment: Oral phase of swallow WFL. Pt was able to form and clear a cohesive bolus with min lingual residue. Pt was able to clear residue with use of liquid wash. Indicators of Pharyngeal Phase Dysfunction   Pharyngeal Phase  Pharyngeal Phase: WFL  Pharyngeal Phase   Pharyngeal: Pharyngeal phase of swallow WFL.  No overt s/s of aspiration observed following any of the presented

## 2021-07-10 NOTE — FLOWSHEET NOTE
0800--Pt refusing heart monitor. Will notify MD.    9943-- A&Ox4. PERRLA. Equal strength, follows commands. No headache, blurred vision, or slurred speech. No dizziness. No SOB/dyspnea, lungs are clear with diminished bases. Denies chest pain. Abdomen is soft, non-tender with active bowels x4. No edema, denies pain while urinating. Up with steady gait. Crusted over sores to left neck and forearm. Call light is within reach. Pt wife at bedside.

## 2021-07-11 NOTE — PROGRESS NOTES
Physical Therapy  Facility/Department: [facility] MED SURG [id number]  Physical Therapy Discharge      NAME: Everardo Girt Day    : 1940 (79 y.o.)  MRN: [id number]    Account: [de-identified]  Gender: male      Patient has been discharged from acute care hospital. DC patient from current PT program.      Electronically signed by Petey Curtis PT on 21 at 11:19 AM EDT

## 2021-07-13 ENCOUNTER — CARE COORDINATION (OUTPATIENT)
Dept: CASE MANAGEMENT | Age: 81
End: 2021-07-13

## 2021-07-13 LAB
EKG ATRIAL RATE: 86 BPM
EKG P AXIS: 60 DEGREES
EKG P-R INTERVAL: 206 MS
EKG Q-T INTERVAL: 420 MS
EKG QRS DURATION: 158 MS
EKG QTC CALCULATION (BAZETT): 502 MS
EKG R AXIS: -30 DEGREES
EKG T AXIS: 123 DEGREES
EKG VENTRICULAR RATE: 86 BPM

## 2021-07-13 NOTE — CARE COORDINATION
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 .. ED to Hosp-Admission (Discharged) (ADMITTED) Harper Barnes MD; Agustin Bowden. .. Initial CT Outreach. Left Hippa compliant VM w/ appt reminders.     OP Vestibular Therapy     New pt appt Dr Amanda Munoz  10:45 (pt canceled and rescheduled for  8:15  Dr Roro Sanon 7/15 9:15  C Aaron 8/10 3:20    Care Transitions 24 Hour Call    Care Transitions Interventions         Follow Up  Future Appointments   Date Time Provider Zena Mackay   7/15/2021  9:15 AM MD Cheko Chase   2021  8:15 AM Rajan Scherer MD [facility] [place]   8/10/2021  3:20 PM ESTELA Cochran   2021  9:00 AM Lennie Su MD [address] [place] [address]

## 2021-07-14 ENCOUNTER — CARE COORDINATION (OUTPATIENT)
Dept: CASE MANAGEMENT | Age: 81
End: 2021-07-14

## 2021-07-14 NOTE — CARE COORDINATION
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 .. ED to Hosp-Admission (Discharged) (ADMITTED) Dario Cueto MD; Agustin Bowden. ..           OP Vestibular Therapy                New pt appt Dr Emiliana Huynh  10:45 (pt canceled and rescheduled for  8:15  Dr Melinda Candelario 7/15 9:15  TIA Walker 8/10 3:20    Transitions of Care Initial Call    Was this an external facility discharge? No Discharge Facility:     Challenges to be reviewed by the provider   Additional needs identified to be addressed with provider: No  OP Vestibular Therapy             Method of communication with provider : none      Advance Care Planning:   Does patient have an Advance Directive: reviewed and current. Was this a readmission? No  Patient stated reason for admission: James Stinson reports no dizziness episodes since return home. Enc to monitor daily BP, v/u. Enc to reposition slowly, v/u. States he has all his meds/taking and denies any home needs. Reports home BS range 108-180. Uses cane PRN. Pending OP vestibular therapy. Patients top risk factors for readmission: medical condition-Vertigo    Care Transition Nurse (CTN) contacted the patient by telephone to perform post hospital discharge assessment. Verified name and  with patient as identifiers. Provided introduction to self, and explanation of the CTN role. CTN reviewed discharge instructions, medical action plan and red flags with patient who verbalized understanding.  Patient given an opportunity to ask questions and does not have any further questions or concerns at this time. Were discharge instructions available to patient? Yes. Reviewed appropriate site of care based on symptoms and resources available to patient including: When to call 911. The patient agrees to contact the PCP office for questions related to their healthcare. Medication reconciliation was performed with patient, who verbalizes understanding of administration of home medications. Advised obtaining a 90-day supply of all daily and as-needed medications. CTN provided contact information. Plan for follow-up call in 5-7 days based on severity of symptoms and risk factors. Plan for next call: symptom management-Dizziness fu    Care Transitions 24 Hour Call    Do you have any ongoing symptoms?: No  Do you have a copy of your discharge instructions?: Yes  Do you have all of your prescriptions and are they filled?: Yes  Have you scheduled your follow up appointment?: Yes  Were you discharged with any Home Care or Post Acute Services: Yes  Post Acute Services:  Outpatient/Community Services (Comment: OP Vestibular Therapy)  Do you feel like you have everything you need to keep you well at home?: Yes  Care Transitions Interventions         Follow Up  Future Appointments   Date Time Provider Zena Mackay   7/15/2021  9:15 AM Tricia Anand  [address]   7/22/2021  8:15 AM Britney Golden MD [facility] [place]   8/10/2021  3:20 PM ESTELA Gibbs   9/14/2021  9:00 AM Tricia Anand MD [address] [place] [address]

## 2021-07-15 ENCOUNTER — OFFICE VISIT (OUTPATIENT)
Dept: ENDOCRINOLOGY | Age: 81
End: 2021-07-15
Payer: MEDICARE

## 2021-07-15 VITALS
WEIGHT: 241 LBS | OXYGEN SATURATION: 94 % | HEIGHT: 72 IN | DIASTOLIC BLOOD PRESSURE: 75 MMHG | BODY MASS INDEX: 32.64 KG/M2 | SYSTOLIC BLOOD PRESSURE: 134 MMHG | HEART RATE: 76 BPM

## 2021-07-15 DIAGNOSIS — E11.65 TYPE 2 DIABETES MELLITUS WITH HYPERGLYCEMIA, WITH LONG-TERM CURRENT USE OF INSULIN (HCC): Primary | ICD-10-CM

## 2021-07-15 DIAGNOSIS — Z79.4 TYPE 2 DIABETES MELLITUS WITH HYPERGLYCEMIA, WITH LONG-TERM CURRENT USE OF INSULIN (HCC): Primary | ICD-10-CM

## 2021-07-15 DIAGNOSIS — L98.9 SKIN LESION OF CHEEK: ICD-10-CM

## 2021-07-15 DIAGNOSIS — E78.5 HYPERLIPIDEMIA, UNSPECIFIED HYPERLIPIDEMIA TYPE: ICD-10-CM

## 2021-07-15 DIAGNOSIS — E03.9 HYPOTHYROIDISM, UNSPECIFIED TYPE: ICD-10-CM

## 2021-07-15 LAB
CHP ED QC CHECK: NORMAL
GLUCOSE BLD-MCNC: 268 MG/DL

## 2021-07-15 PROCEDURE — 1036F TOBACCO NON-USER: CPT | Performed by: INTERNAL MEDICINE

## 2021-07-15 PROCEDURE — G8417 CALC BMI ABV UP PARAM F/U: HCPCS | Performed by: INTERNAL MEDICINE

## 2021-07-15 PROCEDURE — 1123F ACP DISCUSS/DSCN MKR DOCD: CPT | Performed by: INTERNAL MEDICINE

## 2021-07-15 PROCEDURE — 99214 OFFICE O/P EST MOD 30 MIN: CPT | Performed by: INTERNAL MEDICINE

## 2021-07-15 PROCEDURE — 4040F PNEUMOC VAC/ADMIN/RCVD: CPT | Performed by: INTERNAL MEDICINE

## 2021-07-15 PROCEDURE — 82962 GLUCOSE BLOOD TEST: CPT | Performed by: INTERNAL MEDICINE

## 2021-07-15 PROCEDURE — G8427 DOCREV CUR MEDS BY ELIG CLIN: HCPCS | Performed by: INTERNAL MEDICINE

## 2021-07-15 RX ORDER — GLIMEPIRIDE 2 MG/1
2 TABLET ORAL
COMMUNITY
End: 2021-09-02

## 2021-07-15 NOTE — PROGRESS NOTES
7/15/2021    Assessment:       Diagnosis Orders   1. Type 2 diabetes mellitus with hyperglycemia, with long-term current use of insulin ([facility])  POCT Glucose    Hemoglobin Y2Q    Basic Metabolic Panel   2. Hypothyroidism, unspecified type  TSH without Reflex    T4, Free   3. Skin lesion of cheek  RAMON Saldana MD, Dermatology, [facility]   4.  Hyperlipidemia, unspecified hyperlipidemia type           PLAN:     Orders Placed This Encounter   Procedures    TSH without Reflex     Standing Status:   Future     Standing Expiration Date:   7/15/2022    T4, Free     Standing Status:   Future     Standing Expiration Date:   7/15/2022    Hemoglobin A1C     Standing Status:   Future     Standing Expiration Date:   7/15/2022    Basic Metabolic Panel     Standing Status:   Future     Standing Expiration Date:   7/15/2022    RAMON Saldana MD, Dermatology, [facility]     Referral Priority:   Routine     Referral Type:   Eval and Treat     Referral Reason:   Specialty Services Required     Referred to Provider:   Greg Negrete MD     Requested Specialty:   Dermatology     Number of Visits Requested:   1    POCT Glucose     Continue Synthroid 137 mcg daily Metformin glimepiride Crestor    Refer patient to dermatology  Recommended lesions need to be removed in view of high risk of being cancerous  Continue current dose of medications for diabetes hypothyroidism hyperlipidemia  More than 50% of 30-minute spent patient education counseling    Subjective:     Chief Complaint   Patient presents with    Diabetes    Hypothyroidism     Vitals:    07/15/21 0910 07/15/21 0919   BP: (!) 147/79 134/75   Pulse: 76    SpO2: 94%    Weight: 241 lb (109.3 kg)    Height: 6' (1.829 m)      Wt Readings from Last 3 Encounters:   07/15/21 241 lb (109.3 kg)   07/09/21 242 lb 12.8 oz (110.1 kg)   09/14/20 239 lb (108.4 kg)     BP Readings from Last 3 Encounters:   07/15/21 134/75   07/10/21 (!) 159/64   09/14/20 133/78 Follow-up on type 2 diabetes hypothyroidism hyperlipidemia patient not taking Crestor history of CVA had labs done recently patient also complains of lesion over his left cheek chin area which is brown-black in color denies any fever chills injuries  Patient also admitted beginning of July for TIA dizziness  For type 2 diabetes patient is on Metformin plus glimepiride blood glucose was high today      Diabetes  He presents for his follow-up diabetic visit. He has type 2 diabetes mellitus. His disease course has been stable. There are no hypoglycemic associated symptoms. Pertinent negatives for diabetes include no polydipsia, no polyuria and no visual change. Symptoms are stable. Diabetic complications include a CVA. Current diabetic treatment includes oral agent (dual therapy). His overall blood glucose range is 130-140 mg/dl. (Lab Results       Component                Value               Date                       LABA1C                   6.6 (H)             07/10/2021              ) An ACE inhibitor/angiotensin II receptor blocker is being taken. Hyperlipidemia  This is a chronic problem. The current episode started more than 1 year ago. The problem is controlled. Exacerbating diseases include diabetes and obesity. Current antihyperlipidemic treatment includes statins. Compliance problems include medication cost.        Results for Gisela Gutierrez (MRN [id number]) as of 7/24/2021 17:54   Ref.  Range 7/10/2021 05:34 7/10/2021 07:58 7/10/2021 12:04 7/15/2021 09:14   Sodium Latest Ref Range: 135 - 144 mEq/L 140      Potassium Latest Ref Range: 3.4 - 4.9 mEq/L 4.2      Chloride Latest Ref Range: 95 - 107 mEq/L 103      CO2 Latest Ref Range: 20 - 31 mEq/L 27      BUN Latest Ref Range: 8 - 23 mg/dL 14      Creatinine Latest Ref Range: 0.70 - 1.20 mg/dL 0.99      Anion Gap Latest Ref Range: 9 - 15 mEq/L 10      GFR Non- Latest Ref Range: >60  >60.0      GFR African American Latest Ref Range: >60  >60.0 Glucose Latest Units: mg/dL 134 (H)   268   POC Glucose Latest Ref Range: 60 - 115 mg/dl  139 (H) 235 (H)    Calcium Latest Ref Range: 8.5 - 9.9 mg/dL 9.1      Troponin Latest Ref Range: 0.000 - 0.010 ng/mL <0.010      Cholesterol, Total Latest Ref Range: 0 - 199 mg/dL 191      HDL Cholesterol Latest Ref Range: 40 - 59 mg/dL 36 (L)      LDL Calculated Latest Ref Range: 0 - 129 mg/dL 134 (H)      Triglycerides Latest Ref Range: 0 - 150 mg/dL 106      Hemoglobin A1C Latest Ref Range: 4.8 - 5.9 % 6.6 (H)      WBC Latest Ref Range: 4.8 - 10.8 K/uL 7.0      RBC Latest Ref Range: 4.70 - 6.10 M/uL 4.59 (L)      Hemoglobin Quant Latest Ref Range: 14.0 - 18.0 g/dL 13.6 (L)      Hematocrit Latest Ref Range: 42.0 - 52.0 % 40.7 (L)      MCV Latest Ref Range: 80.0 - 100.0 fL 88.8      MCH Latest Ref Range: 27.0 - 31.3 pg 29.7      MCHC Latest Ref Range: 33.0 - 37.0 % 33.4      RDW Latest Ref Range: 11.5 - 14.5 % 14.3      Platelet Count Latest Ref Range: 130 - 400 K/uL 231      Neutrophils % Latest Units: % 53.1      Lymphocyte % Latest Units: % 27.1      Monocytes % Latest Units: % 15.3      Eosinophils % Latest Units: % 3.6      Basophils % Latest Units: % 0.9      Neutrophils Absolute Latest Ref Range: 1.4 - 6.5 K/uL 3.7      Lymphocytes Absolute Latest Ref Range: 1.0 - 4.8 K/uL 1.9      Monocytes Absolute Latest Ref Range: 0.2 - 0.8 K/uL 1.1 (H)      Eosinophils Absolute Latest Ref Range: 0.0 - 0.7 K/uL 0.3      Basophils Absolute Latest Ref Range: 0.0 - 0.2 K/uL 0.1          Past Medical History:   Diagnosis Date    Acute CVA (cerebrovascular accident) ([facility] 6/3/2015    Left lacunar in centrum semi-ovale got TPA 5-31-15 Dr Esha Torres Hyperlipidemia     on meds x 3 yrs    Hypertension     on meds x 3 yrs    Hypothyroidism     Obstructive sleep apnea     does not use CPAP    Type II or unspecified type diabetes mellitus without mention of complication, not stated as uncontrolled      Past Surgical History:   Procedure Laterality Date    DENTAL SURGERY      SEPTOPLASTY Bilateral 2019    SEPTOPLASTY, MICRODEBRIDER ASSISTED TURBINOPLASTY AND OUT-FRACTURING BILATERAL NASAL ENDOSCOPY performed by Aundrea Wilson MD at [address] [place] [address] History     Socioeconomic History    Marital status:      Spouse name: Not on file    Number of children: Not on file    Years of education: Not on file    Highest education level: Not on file   Occupational History    Not on file   Tobacco Use    Smoking status: Former Smoker     Packs/day: 1.00     Years: 20.00     Pack years: 20.00     Start date: 10/9/1968     Quit date:      Years since quittin.5    Smokeless tobacco: Never Used   Vaping Use    Vaping Use: Never used   Substance and Sexual Activity    Alcohol use: Yes     Comment: socially    Drug use: No    Sexual activity: Not Currently     Partners: Female   Other Topics Concern    Not on file   Social History Narrative         Lives With: Spouse    Type of Home: House    Home Layout: Two level (20 with hand rails)    Home Access: Stairs to enter with rails    Entrance Stairs - Number of Steps: 3    Entrance Stairs - Rails: Right    Bathroom Shower/Tub: Walk-in shower    Bathroom Equipment: Shower chair    Home Equipment:  (no DME)    ADL Assistance: Independent    Homemaking Assistance: Independent    Homemaking Responsibilities: Yes (shared)    Ambulation Assistance: Independent    Transfer Assistance: Independent     Social Determinants of Health     Financial Resource Strain:     Difficulty of Paying Living Expenses:    Food Insecurity:     Worried About [address] in the Last Year:     [address] in the Last Year:    Transportation Needs:     Lack of Transportation (Medical):      Lack of Transportation (Non-Medical):    Physical Activity:     Days of Exercise per Week:     Minutes of Exercise per Session:    Stress:     Feeling of Stress :    Social Connections: 13.6 (L) 07/10/2021    HCT 40.7 (L) 07/10/2021    MCV 88.8 07/10/2021     07/10/2021     Lab Results   Component Value Date    LABA1C 6.6 (H) 07/10/2021    LABA1C 6.7 (H) 07/09/2021    LABA1C 7.0 09/08/2020     Lab Results   Component Value Date    HDL 36 (L) 07/10/2021    HDL 44 03/12/2019    HDL 45 02/23/2018    LDLCALC 134 (H) 07/10/2021    LDLCALC 90 03/12/2019    LDLCALC 108 02/23/2018    CHOL 191 07/10/2021    CHOL 150 03/12/2019    CHOL 173 02/23/2018    TRIG 106 07/10/2021    TRIG 82 03/12/2019    TRIG 102 02/23/2018     Lab Results   Component Value Date    TSH 1.130 09/15/2020    TSH 1.390 09/13/2019    TSH 2.880 03/12/2019    T4FREE 1.65 09/15/2020    T4FREE 1.66 09/13/2019    T4FREE 1.19 03/12/2019       Review of Systems   Endocrine: Negative. Negative for polydipsia and polyuria. Skin:        Left cheek lesion    All other systems reviewed and are negative. Objective:   Physical Exam  Vitals reviewed. Constitutional:       Appearance: Normal appearance. He is obese. HENT:      Head: Normocephalic and atraumatic. Hair is normal.        Right Ear: External ear normal.      Left Ear: External ear normal.      Nose: Nose normal.      Mouth/Throat:      Pharynx: Oropharynx is clear. Eyes:      General: No scleral icterus. Right eye: No discharge. Left eye: No discharge. Extraocular Movements: Extraocular movements intact. Conjunctiva/sclera: Conjunctivae normal.   Neck:      Trachea: Trachea normal.   Cardiovascular:      Rate and Rhythm: Normal rate. Pulmonary:      Effort: Pulmonary effort is normal.   Musculoskeletal:         General: Normal range of motion. Cervical back: Normal range of motion and neck supple. Right lower leg: No edema. Left lower leg: No edema. Neurological:      General: No focal deficit present. Mental Status: He is alert and oriented to person, place, and time.    Psychiatric:         Mood and Affect: Mood is anxious and depressed.          Behavior: Behavior normal.

## 2021-07-22 ENCOUNTER — CARE COORDINATION (OUTPATIENT)
Dept: CASE MANAGEMENT | Age: 81
End: 2021-07-22

## 2021-07-24 ASSESSMENT — ENCOUNTER SYMPTOMS: VISUAL CHANGE: 0

## 2021-07-27 ENCOUNTER — CARE COORDINATION (OUTPATIENT)
Dept: CASE MANAGEMENT | Age: 81
End: 2021-07-27

## 2021-07-27 NOTE — CARE COORDINATION
[facility] Transitions Follow Up Call    2021    Patient: Jyotsna Kruger  Patient : 1940   MRN: [id number]  Reason for Admission: 2021 - 7/10/2021 Obs Vertebrobasilar insufficiency with a session of a posterior circulation TIA   Discharge Date: 7/10/21 RARS: No data recorded    Second subsequent CT outreach. Left Hippa compliant VM. CTN s/o. OP Vestibular Therapy                New pt appt Dr Zeina Ying  10:45. Pt canceled. Rescheduled for  8:15. Pt canceled. Dr Ana Bundy 7/15 9:15. Attended. TIA Walker 8/10 3:20    Care Transitions Subsequent and Final Call    Subsequent and Final Calls  Care Transitions Interventions  Other Interventions:            Follow Up  Future Appointments   Date Time Provider Zena Mackay   8/10/2021  3:20 PM ESTELA Borjas   2021  9:00 AM Uday Wolfe MD [address] [place] [address]

## 2021-09-02 DIAGNOSIS — E11.9 TYPE 2 DIABETES MELLITUS WITHOUT COMPLICATION, WITHOUT LONG-TERM CURRENT USE OF INSULIN (HCC): ICD-10-CM

## 2021-09-02 DIAGNOSIS — Z76.0 MEDICATION REFILL: ICD-10-CM

## 2021-09-02 RX ORDER — LISINOPRIL AND HYDROCHLOROTHIAZIDE 20; 12.5 MG/1; MG/1
TABLET ORAL
Qty: 90 TABLET | Refills: 3 | Status: SHIPPED | OUTPATIENT
Start: 2021-09-02 | End: 2021-09-14 | Stop reason: SDUPTHER

## 2021-09-02 RX ORDER — GLIMEPIRIDE 2 MG/1
TABLET ORAL
Qty: 180 TABLET | Refills: 3 | Status: SHIPPED | OUTPATIENT
Start: 2021-09-02 | End: 2021-09-14 | Stop reason: SDUPTHER

## 2021-09-02 RX ORDER — LEVOTHYROXINE SODIUM 137 UG/1
TABLET ORAL
Qty: 90 TABLET | Refills: 3 | Status: SHIPPED | OUTPATIENT
Start: 2021-09-02 | End: 2021-09-14 | Stop reason: SDUPTHER

## 2021-09-14 ENCOUNTER — OFFICE VISIT (OUTPATIENT)
Dept: ENDOCRINOLOGY | Age: 81
End: 2021-09-14
Payer: MEDICARE

## 2021-09-14 VITALS
SYSTOLIC BLOOD PRESSURE: 140 MMHG | WEIGHT: 238 LBS | BODY MASS INDEX: 32.23 KG/M2 | HEIGHT: 72 IN | HEART RATE: 69 BPM | DIASTOLIC BLOOD PRESSURE: 64 MMHG | OXYGEN SATURATION: 96 %

## 2021-09-14 DIAGNOSIS — Z79.4 TYPE 2 DIABETES MELLITUS WITH HYPERGLYCEMIA, WITH LONG-TERM CURRENT USE OF INSULIN (HCC): ICD-10-CM

## 2021-09-14 DIAGNOSIS — E03.9 HYPOTHYROIDISM, UNSPECIFIED TYPE: Primary | ICD-10-CM

## 2021-09-14 DIAGNOSIS — E11.65 TYPE 2 DIABETES MELLITUS WITH HYPERGLYCEMIA, WITH LONG-TERM CURRENT USE OF INSULIN (HCC): ICD-10-CM

## 2021-09-14 DIAGNOSIS — E11.9 TYPE 2 DIABETES MELLITUS WITHOUT COMPLICATION, WITHOUT LONG-TERM CURRENT USE OF INSULIN (HCC): ICD-10-CM

## 2021-09-14 DIAGNOSIS — Z76.0 MEDICATION REFILL: ICD-10-CM

## 2021-09-14 LAB
CHP ED QC CHECK: NORMAL
GLUCOSE BLD-MCNC: 224 MG/DL

## 2021-09-14 PROCEDURE — G8427 DOCREV CUR MEDS BY ELIG CLIN: HCPCS | Performed by: INTERNAL MEDICINE

## 2021-09-14 PROCEDURE — 4040F PNEUMOC VAC/ADMIN/RCVD: CPT | Performed by: INTERNAL MEDICINE

## 2021-09-14 PROCEDURE — 1036F TOBACCO NON-USER: CPT | Performed by: INTERNAL MEDICINE

## 2021-09-14 PROCEDURE — 99213 OFFICE O/P EST LOW 20 MIN: CPT | Performed by: INTERNAL MEDICINE

## 2021-09-14 PROCEDURE — 3051F HG A1C>EQUAL 7.0%<8.0%: CPT | Performed by: INTERNAL MEDICINE

## 2021-09-14 PROCEDURE — G8417 CALC BMI ABV UP PARAM F/U: HCPCS | Performed by: INTERNAL MEDICINE

## 2021-09-14 PROCEDURE — 1123F ACP DISCUSS/DSCN MKR DOCD: CPT | Performed by: INTERNAL MEDICINE

## 2021-09-14 PROCEDURE — 82962 GLUCOSE BLOOD TEST: CPT | Performed by: INTERNAL MEDICINE

## 2021-09-14 RX ORDER — LEVOTHYROXINE SODIUM 137 UG/1
TABLET ORAL
Qty: 90 TABLET | Refills: 3 | Status: SHIPPED | OUTPATIENT
Start: 2021-09-14 | End: 2021-09-14 | Stop reason: SDUPTHER

## 2021-09-14 RX ORDER — LISINOPRIL AND HYDROCHLOROTHIAZIDE 20; 12.5 MG/1; MG/1
TABLET ORAL
Qty: 90 TABLET | Refills: 3 | Status: SHIPPED | OUTPATIENT
Start: 2021-09-14 | End: 2022-09-06

## 2021-09-14 RX ORDER — CLOPIDOGREL BISULFATE 75 MG/1
75 TABLET ORAL DAILY
Qty: 30 TABLET | Refills: 3 | Status: SHIPPED | OUTPATIENT
Start: 2021-09-14 | End: 2021-09-14 | Stop reason: SDUPTHER

## 2021-09-14 RX ORDER — CLOPIDOGREL BISULFATE 75 MG/1
75 TABLET ORAL DAILY
Qty: 30 TABLET | Refills: 3 | Status: SHIPPED | OUTPATIENT
Start: 2021-09-14 | End: 2022-02-21 | Stop reason: SDUPTHER

## 2021-09-14 RX ORDER — LEVOTHYROXINE SODIUM 137 UG/1
TABLET ORAL
Qty: 90 TABLET | Refills: 3 | Status: SHIPPED | OUTPATIENT
Start: 2021-09-14 | End: 2022-09-06

## 2021-09-14 RX ORDER — LISINOPRIL AND HYDROCHLOROTHIAZIDE 20; 12.5 MG/1; MG/1
TABLET ORAL
Qty: 90 TABLET | Refills: 3 | Status: SHIPPED | OUTPATIENT
Start: 2021-09-14 | End: 2021-09-14 | Stop reason: SDUPTHER

## 2021-09-14 RX ORDER — GLIMEPIRIDE 2 MG/1
TABLET ORAL
Qty: 180 TABLET | Refills: 3 | Status: SHIPPED | OUTPATIENT
Start: 2021-09-14 | End: 2021-09-14 | Stop reason: SDUPTHER

## 2021-09-14 RX ORDER — GLIMEPIRIDE 2 MG/1
TABLET ORAL
Qty: 180 TABLET | Refills: 3 | Status: SHIPPED | OUTPATIENT
Start: 2021-09-14 | End: 2022-09-06

## 2021-09-14 NOTE — PROGRESS NOTES
9/14/2021    Assessment:       Diagnosis Orders   1. Hypothyroidism, unspecified type  T4, Free    TSH without Reflex   2. Type 2 diabetes mellitus with hyperglycemia, with long-term current use of insulin ([facility])  POCT Glucose    Basic Metabolic Panel    Hemoglobin A1C   3. Medication refill  levothyroxine (SYNTHROID) 137 MCG tablet    metFORMIN (GLUCOPHAGE) 1000 MG tablet   4.  Type 2 diabetes mellitus without complication, without long-term current use of insulin ([facility])  metFORMIN (GLUCOPHAGE) 1000 MG tablet         PLAN:     Orders Placed This Encounter   Procedures    Basic Metabolic Panel     Standing Status:   Future     Standing Expiration Date:   9/14/2022    Hemoglobin A1C     Standing Status:   Future     Standing Expiration Date:   9/14/2022    T4, Free     Standing Status:   Future     Standing Expiration Date:   9/14/2022    TSH without Reflex     Standing Status:   Future     Standing Expiration Date:   9/14/2022    POCT Glucose     Orders Placed This Encounter   Medications    DISCONTD: clopidogrel (PLAVIX) 75 MG tablet     Sig: Take 1 tablet by mouth daily     Dispense:  30 tablet     Refill:  3    DISCONTD: glimepiride (AMARYL) 2 MG tablet     Sig: Take 1 tablet by mouth twice daily     Dispense:  180 tablet     Refill:  3    DISCONTD: levothyroxine (SYNTHROID) 137 MCG tablet     Sig: Take 1 tablet by mouth once daily     Dispense:  90 tablet     Refill:  3    DISCONTD: lisinopril-hydroCHLOROthiazide (PRINZIDE;ZESTORETIC) 20-12.5 MG per tablet     Sig: Take 1 tablet by mouth once daily     Dispense:  90 tablet     Refill:  3    DISCONTD: metFORMIN (GLUCOPHAGE) 1000 MG tablet     Sig: TAKE 1 TABLET BY MOUTH TWICE DAILY WITH MEALS     Dispense:  180 tablet     Refill:  3    clopidogrel (PLAVIX) 75 MG tablet     Sig: Take 1 tablet by mouth daily     Dispense:  30 tablet     Refill:  3    glimepiride (AMARYL) 2 MG tablet     Sig: Take 1 tablet by mouth twice daily     Dispense:  180 tablet Refill:  3    levothyroxine (SYNTHROID) 137 MCG tablet     Sig: Take 1 tablet by mouth once daily     Dispense:  90 tablet     Refill:  3    lisinopril-hydroCHLOROthiazide (PRINZIDE;ZESTORETIC) 20-12.5 MG per tablet     Sig: Take 1 tablet by mouth once daily     Dispense:  90 tablet     Refill:  3    metFORMIN (GLUCOPHAGE) 1000 MG tablet     Sig: TAKE 1 TABLET BY MOUTH TWICE DAILY WITH MEALS     Dispense:  180 tablet     Refill:  3           Orders Placed This Encounter   Procedures    POCT Glucose       Subjective:     Chief Complaint   Patient presents with    Diabetes    Hypothyroidism     Vitals:    09/14/21 0858 09/14/21 0909   BP: (!) 144/65 (!) 140/64   Pulse: 69    SpO2: 96%    Weight: 238 lb (108 kg)    Height: 6' (1.829 m)      Wt Readings from Last 3 Encounters:   09/14/21 238 lb (108 kg)   07/15/21 241 lb (109.3 kg)   07/09/21 242 lb 12.8 oz (110.1 kg)     BP Readings from Last 3 Encounters:   09/14/21 (!) 140/64   07/15/21 134/75   07/10/21 (!) 159/64     Follow-up on type 2 diabetes on Metformin glimepiride hypothyroidism stable on replacement history of CVA in the past patient is going to see a dermatologist for a skin lesion on his history requesting refills on all of his medication complains of overall weakness    Diabetes  He presents for his follow-up diabetic visit. He has type 2 diabetes mellitus. Diabetic complications include a CVA. Risk factors for coronary artery disease include obesity. Current diabetic treatment includes oral agent (dual therapy). His overall blood glucose range is 140-180 mg/dl. (Lab Results       Component                Value               Date                       LABA1C                   7.2 (H)             09/10/2021            )        Results for Jacob Roca (MRN [id number]) as of 9/14/2021 09:15   Ref.  Range 9/10/2021 07:28 9/14/2021 08:58   Sodium Latest Ref Range: 135 - 144 mEq/L 139    Potassium Latest Ref Range: 3.4 - 4.9 mEq/L 4.7    Chloride Latest Ref Range: 95 - 107 mEq/L 99    CO2 Latest Ref Range: 20 - 31 mEq/L 26    BUN Latest Ref Range: 8 - 23 mg/dL 18    Creatinine Latest Ref Range: 0.70 - 1.20 mg/dL 1.09    Anion Gap Latest Ref Range: 9 - 15 mEq/L 14    GFR Non- Latest Ref Range: >60  >60.0    GFR African American Latest Ref Range: >60  >60.0    Glucose Latest Units: mg/dL 135 (H) 224   Calcium Latest Ref Range: 8.5 - 9.9 mg/dL 9.1    Hemoglobin A1C Latest Ref Range: 4.8 - 5.9 % 7.2 (H)    TSH Latest Ref Range: 0.440 - 3.860 uIU/mL 2.480    T4 Free Latest Ref Range: 0.84 - 1.68 ng/dL 1.79 (H)        Past Medical History:   Diagnosis Date    Acute CVA (cerebrovascular accident) ([facility] [address] 6/3/2015    Left lacunar in centrum semi-ovale got TPA -15 Dr Adam Cantu Hyperlipidemia     on meds x 3 yrs    Hypertension     on meds x 3 yrs    Hypothyroidism     Obstructive sleep apnea     does not use CPAP    Type II or unspecified type diabetes mellitus without mention of complication, not stated as uncontrolled      Past Surgical History:   Procedure Laterality Date    DENTAL SURGERY      SEPTOPLASTY Bilateral 2019    SEPTOPLASTY, MICRODEBRIDER ASSISTED TURBINOPLASTY AND OUT-FRACTURING BILATERAL NASAL ENDOSCOPY performed by Vannessa Whitney MD at [address] [place] [address] History     Socioeconomic History    Marital status:      Spouse name: Not on file    Number of children: Not on file    Years of education: Not on file    Highest education level: Not on file   Occupational History    Not on file   Tobacco Use    Smoking status: Former Smoker     Packs/day: 1.00     Years: 20.00     Pack years: 20.00     Start date: 10/9/1968     Quit date:      Years since quittin.7    Smokeless tobacco: Never Used   Vaping Use    Vaping Use: Never used   Substance and Sexual Activity    Alcohol use: Yes     Comment: socially    Drug use: No    Sexual activity: Not Currently     Partners: Female   Other Topics Concern    Not on file   Social History Narrative         Lives With: Spouse    Type of Home: House    Home Layout: Two level (20 with hand rails)    Home Access: Stairs to enter with rails    Entrance Stairs - Number of Steps: 3    Entrance Stairs - Rails: Right    Bathroom Shower/Tub: Walk-in shower    Bathroom Equipment: Shower chair    Home Equipment:  (no DME)    ADL Assistance: [place]: Independent    Homemaking Responsibilities: Yes (shared)    Ambulation Assistance: Independent    Transfer Assistance: Independent     Social Determinants of Health     Financial Resource Strain:     Difficulty of Paying Living Expenses:    Food Insecurity:     Worried About Running Out of Food in the Last Year:     [address] in the Last Year:    Transportation Needs:     Lack of Transportation (Medical):      Lack of Transportation (Non-Medical):    Physical Activity:     Days of Exercise per Week:     Minutes of Exercise per Session:    Stress:     Feeling of Stress :    Social Connections:     Frequency of Communication with Friends and Family:     Frequency of Social Gatherings with Friends and Family:     Attends [employer] Services:     Active Member of Clubs or Organizations:     Attends Club or Organization Meetings:     Marital Status:    Intimate Partner Violence:     Fear of Current or Ex-Partner:     Emotionally Abused:     Physically Abused:     Sexually Abused:      Family History   Problem Relation Age of Onset    Early Death Mother     Cancer Mother         lung cancer    No Known Problems Father     No Known Problems Daughter      No Known Allergies    Current Outpatient Medications:     metFORMIN (GLUCOPHAGE) 1000 MG tablet, TAKE 1 TABLET BY MOUTH TWICE DAILY WITH MEALS, Disp: 180 tablet, Rfl: 3    lisinopril-hydroCHLOROthiazide (PRINZIDE;ZESTORETIC) 20-12.5 MG per tablet, Take 1 tablet by mouth once daily, Disp: 90 tablet, Rfl: 3    levothyroxine (SYNTHROID) 137 MCG tablet, Take 1 tablet by mouth once daily, Disp: 90 tablet, Rfl: 3    glimepiride (AMARYL) 2 MG tablet, Take 1 tablet by mouth twice daily, Disp: 180 tablet, Rfl: 3    clopidogrel (PLAVIX) 75 MG tablet, Take 1 tablet by mouth daily, Disp: 30 tablet, Rfl: 3    fluticasone (FLONASE) 50 MCG/ACT nasal spray, 2 sprays by Nasal route daily, Disp: 1 Bottle, Rfl: 2  Lab Results   Component Value Date     09/10/2021    K 4.7 09/10/2021    CL 99 09/10/2021    CO2 26 09/10/2021    BUN 18 09/10/2021    CREATININE 1.09 09/10/2021    GLUCOSE 224 09/14/2021    CALCIUM 9.1 09/10/2021    PROT 7.4 07/09/2021    LABALBU 4.0 07/09/2021    BILITOT <0.2 07/09/2021    ALKPHOS 67 07/09/2021    AST 17 07/09/2021    ALT 28 07/09/2021    LABGLOM >60.0 09/10/2021    GFRAA >60.0 09/10/2021    GLOB 3.4 07/09/2021     Lab Results   Component Value Date    WBC 7.0 07/10/2021    HGB 13.6 (L) 07/10/2021    HCT 40.7 (L) 07/10/2021    MCV 88.8 07/10/2021     07/10/2021     Lab Results   Component Value Date    LABA1C 7.2 (H) 09/10/2021    LABA1C 6.6 (H) 07/10/2021    LABA1C 6.7 (H) 07/09/2021     Lab Results   Component Value Date    HDL 36 (L) 07/10/2021    HDL 44 03/12/2019    HDL 45 02/23/2018    LDLCALC 134 (H) 07/10/2021    LDLCALC 90 03/12/2019    LDLCALC 108 02/23/2018    CHOL 191 07/10/2021    CHOL 150 03/12/2019    CHOL 173 02/23/2018    TRIG 106 07/10/2021    TRIG 82 03/12/2019    TRIG 102 02/23/2018     No results found for: TESTM  Lab Results   Component Value Date    TSH 2.480 09/10/2021    TSH 1.130 09/15/2020    TSH 1.390 09/13/2019    T4FREE 1.79 (H) 09/10/2021    T4FREE 1.65 09/15/2020    T4FREE 1.66 09/13/2019     No results found for: TPOABS    Review of Systems   Cardiovascular: Negative. Endocrine: Negative. Skin: Positive for color change. Psychiatric/Behavioral: Positive for dysphoric mood. All other systems reviewed and are negative. Objective:   Physical Exam  Vitals reviewed. Constitutional:       Appearance: Normal appearance. He is obese. HENT:      Head: Normocephalic and atraumatic. Hair is normal.        Right Ear: External ear normal.      Left Ear: External ear normal.      Nose: Nose normal.   Eyes:      General: No scleral icterus. Right eye: No discharge. Left eye: No discharge. Extraocular Movements: Extraocular movements intact. Conjunctiva/sclera: Conjunctivae normal.   Neck:      Trachea: Trachea normal.   Cardiovascular:      Rate and Rhythm: Normal rate. Pulmonary:      Effort: Pulmonary effort is normal.   Musculoskeletal:         General: Normal range of motion. Cervical back: Normal range of motion and neck supple. Neurological:      General: No focal deficit present. Mental Status: He is alert and oriented to person, place, and time. Psychiatric:         Mood and Affect: Mood is depressed.          Behavior: Behavior normal.

## 2021-09-19 ASSESSMENT — ENCOUNTER SYMPTOMS: COLOR CHANGE: 1

## 2022-02-21 RX ORDER — CLOPIDOGREL BISULFATE 75 MG/1
75 TABLET ORAL DAILY
Qty: 30 TABLET | Refills: 0 | Status: SHIPPED | OUTPATIENT
Start: 2022-02-21 | End: 2022-03-15 | Stop reason: SDUPTHER

## 2022-02-21 NOTE — TELEPHONE ENCOUNTER
Patient requesting medication refill.  Please approve or deny this request.    Rx requested:  Requested Prescriptions     Pending Prescriptions Disp Refills    clopidogrel (PLAVIX) 75 MG tablet 30 tablet 0     Sig: Take 1 tablet by mouth daily         Last Office Visit:   9/14/2021      Next Visit Date:  Future Appointments   Date Time Provider Zena Mackay   3/15/2022  9:00 AM Tia Jacques MD [facility]

## 2022-02-21 NOTE — TELEPHONE ENCOUNTER
Yusuf Kruger is requesting a refill on the following medication(s):  Requested Prescriptions     Pending Prescriptions Disp Refills    clopidogrel (PLAVIX) 75 MG tablet [Pharmacy Med Name: Clopidogrel Bisulfate 75 MG Oral Tablet] 30 tablet 0     Sig: Take 1 tablet by mouth once daily       Last Visit Date (If Applicable):  Visit date not found    Next Visit Date:    Visit date not found

## 2022-02-22 RX ORDER — CLOPIDOGREL BISULFATE 75 MG/1
TABLET ORAL
Qty: 30 TABLET | Refills: 0 | Status: SHIPPED | OUTPATIENT
Start: 2022-02-22 | End: 2022-03-15 | Stop reason: SDUPTHER

## 2022-03-11 DIAGNOSIS — E11.65 TYPE 2 DIABETES MELLITUS WITH HYPERGLYCEMIA, WITH LONG-TERM CURRENT USE OF INSULIN (HCC): ICD-10-CM

## 2022-03-11 DIAGNOSIS — Z79.4 TYPE 2 DIABETES MELLITUS WITH HYPERGLYCEMIA, WITH LONG-TERM CURRENT USE OF INSULIN (HCC): ICD-10-CM

## 2022-03-11 DIAGNOSIS — E03.9 HYPOTHYROIDISM, UNSPECIFIED TYPE: ICD-10-CM

## 2022-03-11 LAB
ANION GAP SERPL CALCULATED.3IONS-SCNC: 14 MEQ/L (ref 9–15)
BUN BLDV-MCNC: 16 MG/DL (ref 8–23)
CALCIUM SERPL-MCNC: 9.1 MG/DL (ref 8.5–9.9)
CHLORIDE BLD-SCNC: 99 MEQ/L (ref 95–107)
CO2: 24 MEQ/L (ref 20–31)
CREAT SERPL-MCNC: 1.09 MG/DL (ref 0.7–1.2)
GFR AFRICAN AMERICAN: >60
GFR NON-AFRICAN AMERICAN: >60
GLUCOSE BLD-MCNC: 124 MG/DL (ref 70–99)
HBA1C MFR BLD: 6.9 % (ref 4.8–5.9)
POTASSIUM SERPL-SCNC: 4.3 MEQ/L (ref 3.4–4.9)
SODIUM BLD-SCNC: 137 MEQ/L (ref 135–144)
T4 FREE: 1.15 NG/DL (ref 0.84–1.68)
TSH SERPL DL<=0.05 MIU/L-ACNC: 4.07 UIU/ML (ref 0.44–3.86)

## 2022-03-15 ENCOUNTER — OFFICE VISIT (OUTPATIENT)
Dept: ENDOCRINOLOGY | Age: 82
End: 2022-03-15
Payer: MEDICARE

## 2022-03-15 VITALS
BODY MASS INDEX: 32.37 KG/M2 | WEIGHT: 239 LBS | SYSTOLIC BLOOD PRESSURE: 131 MMHG | DIASTOLIC BLOOD PRESSURE: 63 MMHG | OXYGEN SATURATION: 93 % | HEIGHT: 72 IN | HEART RATE: 74 BPM

## 2022-03-15 DIAGNOSIS — E03.9 HYPOTHYROIDISM, UNSPECIFIED TYPE: ICD-10-CM

## 2022-03-15 DIAGNOSIS — L03.114 CELLULITIS OF LEFT UPPER EXTREMITY: ICD-10-CM

## 2022-03-15 DIAGNOSIS — Z79.4 TYPE 2 DIABETES MELLITUS WITH HYPERGLYCEMIA, WITH LONG-TERM CURRENT USE OF INSULIN (HCC): Primary | ICD-10-CM

## 2022-03-15 DIAGNOSIS — E11.65 TYPE 2 DIABETES MELLITUS WITH HYPERGLYCEMIA, WITH LONG-TERM CURRENT USE OF INSULIN (HCC): Primary | ICD-10-CM

## 2022-03-15 LAB
CHP ED QC CHECK: NORMAL
GLUCOSE BLD-MCNC: 224 MG/DL

## 2022-03-15 PROCEDURE — 82962 GLUCOSE BLOOD TEST: CPT | Performed by: INTERNAL MEDICINE

## 2022-03-15 PROCEDURE — 4040F PNEUMOC VAC/ADMIN/RCVD: CPT | Performed by: INTERNAL MEDICINE

## 2022-03-15 PROCEDURE — G8417 CALC BMI ABV UP PARAM F/U: HCPCS | Performed by: INTERNAL MEDICINE

## 2022-03-15 PROCEDURE — 1036F TOBACCO NON-USER: CPT | Performed by: INTERNAL MEDICINE

## 2022-03-15 PROCEDURE — G8484 FLU IMMUNIZE NO ADMIN: HCPCS | Performed by: INTERNAL MEDICINE

## 2022-03-15 PROCEDURE — 99213 OFFICE O/P EST LOW 20 MIN: CPT | Performed by: INTERNAL MEDICINE

## 2022-03-15 PROCEDURE — G8427 DOCREV CUR MEDS BY ELIG CLIN: HCPCS | Performed by: INTERNAL MEDICINE

## 2022-03-15 PROCEDURE — 1123F ACP DISCUSS/DSCN MKR DOCD: CPT | Performed by: INTERNAL MEDICINE

## 2022-03-15 RX ORDER — CEPHALEXIN 500 MG/1
500 CAPSULE ORAL 3 TIMES DAILY
Qty: 30 CAPSULE | Refills: 1 | Status: SHIPPED | OUTPATIENT
Start: 2022-03-15 | End: 2022-09-16 | Stop reason: ALTCHOICE

## 2022-03-15 RX ORDER — CLOPIDOGREL BISULFATE 75 MG/1
TABLET ORAL
Qty: 30 TABLET | Refills: 6 | Status: SHIPPED | OUTPATIENT
Start: 2022-03-15 | End: 2022-09-16 | Stop reason: SDUPTHER

## 2022-03-15 ASSESSMENT — ENCOUNTER SYMPTOMS: COLOR CHANGE: 1

## 2022-03-15 NOTE — PROGRESS NOTES
3/15/2022    Assessment:       Diagnosis Orders   1. Type 2 diabetes mellitus with hyperglycemia, with long-term current use of insulin ([facility])  POCT Glucose   2.  Hypothyroidism, unspecified type           PLAN:     Continue current dose of Metformin Synthroid lisinopril hydrochlorothiazide continue Plavix given cephalexin patient to follow-up with dermatologist if lesion does not get better to go to wound care patient at this time declines to go to wound care    Orders Placed This Encounter   Procedures    T4, Free     Standing Status:   Future     Standing Expiration Date:   3/15/2023    TSH with Reflex     Standing Status:   Future     Standing Expiration Date:   3/15/2023    Hemoglobin A1C     Standing Status:   Future     Standing Expiration Date:   3/15/2023    Basic Metabolic Panel, Fasting     Standing Status:   Future     Standing Expiration Date:   3/15/2023    POCT Glucose     Orders Placed This Encounter   Medications    clopidogrel (PLAVIX) 75 MG tablet     Sig: Take 1 tablet by mouth once daily     Dispense:  30 tablet     Refill:  06    cephALEXin (KEFLEX) 500 MG capsule     Sig: Take 1 capsule by mouth 3 times daily     Dispense:  30 capsule     Refill:  1         Orders Placed This Encounter   Procedures    POCT Glucose       Subjective:     Chief Complaint   Patient presents with    Diabetes    Hypothyroidism     Vitals:    03/15/22 0902   BP: 131/63   Pulse: 74   SpO2: 93%   Weight: 239 lb (108.4 kg)   Height: 6' (1.829 m)     Wt Readings from Last 3 Encounters:   03/15/22 239 lb (108.4 kg)   09/14/21 238 lb (108 kg)   07/15/21 241 lb (109.3 kg)     BP Readings from Last 3 Encounters:   03/15/22 131/63   09/14/21 (!) 140/64   07/15/21 134/75     Follow-up on type 2 diabetes complications include stroke patient complains of gait balance unsteady gait  Hypothyroidism replacement levothyroxine thyroid function test stable A1c also has been stable requesting refill patient also had some type of skin lesion removed over his left hand notices some yellow discharge no fever chills    Diabetes  He presents for his follow-up diabetic visit. He has type 2 diabetes mellitus. Symptoms are stable. Diabetic complications include a CVA. Current diabetic treatment includes oral agent (dual therapy) (Metformin plus glimepiride). His overall blood glucose range is 130-140 mg/dl. (Lab Results       Component                Value               Date                       LABA1C                   6.9 (H)             03/11/2022              ) An ACE inhibitor/angiotensin II receptor blocker is being taken. Results for Lulú Cool (MRN [id number]) as of 3/15/2022 09:18   Ref.  Range 9/10/2021 07:28 9/14/2021 08:58 3/11/2022 07:41 3/15/2022 09:05   Sodium Latest Ref Range: 135 - 144 mEq/L 139  137    Potassium Latest Ref Range: 3.4 - 4.9 mEq/L 4.7  4.3    Chloride Latest Ref Range: 95 - 107 mEq/L 99  99    CO2 Latest Ref Range: 20 - 31 mEq/L 26  24    BUN,BUNPL Latest Ref Range: 8 - 23 mg/dL 18  16    Creatinine Latest Ref Range: 0.70 - 1.20 mg/dL 1.09  1.09    Anion Gap Latest Ref Range: 9 - 15 mEq/L 14  14    GFR Non- Latest Ref Range: >60  >60.0  >60.0    GFR African American Latest Ref Range: >60  >60.0  >60.0    GLUCOSE, FASTING,GF Latest Units: mg/dL 135 (H) 224 124 (H) 224   CALCIUM, SERUM, [id number] Latest Ref Range: 8.5 - 9.9 mg/dL 9.1  9.1    Hemoglobin A1C Latest Ref Range: 4.8 - 5.9 % 7.2 (H)  6.9 (H)    TSH Latest Ref Range: 0.440 - 3.860 uIU/mL 2.480  4.070 (H)    T4 Free Latest Ref Range: 0.84 - 1.68 ng/dL 1.79 (H)  1.15          Past Medical History:   Diagnosis Date    Acute CVA (cerebrovascular accident) ([address] 6/3/2015    Left lacunar in centrum semi-ovale got TPA 5-31-15 Dr Toyin Galvan Hyperlipidemia     on meds x 3 yrs    Hypertension     on meds x 3 yrs    Hypothyroidism     Obstructive sleep apnea     does not use CPAP    Type II or unspecified type diabetes mellitus without mention of complication, not stated as uncontrolled      Past Surgical History:   Procedure Laterality Date    DENTAL SURGERY      SEPTOPLASTY Bilateral 2019    SEPTOPLASTY, MICRODEBRIDER ASSISTED TURBINOPLASTY AND OUT-FRACTURING BILATERAL NASAL ENDOSCOPY performed by Raven Quinones MD at [address] [place] [address] History     Socioeconomic History    Marital status:      Spouse name: Not on file    Number of children: Not on file    Years of education: Not on file    Highest education level: Not on file   Occupational History    Not on file   Tobacco Use    Smoking status: Former Smoker     Packs/day: 1.00     Years: 20.00     Pack years: 20.00     Start date: 10/9/1968     Quit date:      Years since quittin.2    Smokeless tobacco: Never Used   Vaping Use    Vaping Use: Never used   Substance and Sexual Activity    Alcohol use: Yes     Comment: socially    Drug use: No    Sexual activity: Not Currently     Partners: Female   Other Topics Concern    Not on file   Social History Narrative         Lives With: Spouse    Type of Home: House    Home Layout: Two level (20 with hand rails)    Home Access: Stairs to enter with rails    Entrance Stairs - Number of Steps: 3    Entrance Stairs - Rails: Right    Bathroom Shower/Tub: Walk-in shower    Bathroom Equipment: Shower chair    Home Equipment:  (no DME)    ADL Assistance: Independent    Homemaking Assistance: Independent    Homemaking Responsibilities: Yes (shared)    Ambulation Assistance: Independent    Transfer Assistance: Independent     Social Determinants of Health     Financial Resource Strain:     Difficulty of Paying Living Expenses: Not on file   Food Insecurity:     Worried About [address] in the Last Year: Not on file    [address] in the Last Year: Not on file   Transportation Needs:     Lack of Transportation (Medical): Not on file    Lack of Transportation (Non-Medical):  Not on file Physical Activity:     Days of Exercise per Week: Not on file    Minutes of Exercise per Session: Not on file   Stress:     Feeling of Stress : Not on file   Social Connections:     Frequency of Communication with Friends and Family: Not on file    Frequency of Social Gatherings with Friends and Family: Not on file    Attends Baptism Services: Not on file    Active Member of [address] [place] [address] or Organizations: Not on file    Attends Club or Organization Meetings: Not on file    Marital Status: Not on file   Intimate Partner Violence:     Fear of Current or Ex-Partner: Not on file    Emotionally Abused: Not on file    Physically Abused: Not on file    Sexually Abused: Not on file   Housing Stability:     Unable to Pay for Housing in the Last Year: Not on file    Number of [place] in the Last Year: Not on file    Unstable Housing in the Last Year: Not on file     Family History   Problem Relation Age of Onset    Early Death Mother     Cancer Mother         lung cancer    No Known Problems Father     No Known Problems Daughter      No Known Allergies    Current Outpatient Medications:     clopidogrel (PLAVIX) 75 MG tablet, Take 1 tablet by mouth once daily, Disp: 30 tablet, Rfl: 0    glimepiride (AMARYL) 2 MG tablet, Take 1 tablet by mouth twice daily, Disp: 180 tablet, Rfl: 3    levothyroxine (SYNTHROID) 137 MCG tablet, Take 1 tablet by mouth once daily, Disp: 90 tablet, Rfl: 3    lisinopril-hydroCHLOROthiazide (PRINZIDE;ZESTORETIC) 20-12.5 MG per tablet, Take 1 tablet by mouth once daily, Disp: 90 tablet, Rfl: 3    metFORMIN (GLUCOPHAGE) 1000 MG tablet, TAKE 1 TABLET BY MOUTH TWICE DAILY WITH MEALS, Disp: 180 tablet, Rfl: 3  Lab Results   Component Value Date     03/11/2022    K 4.3 03/11/2022    CL 99 03/11/2022    CO2 24 03/11/2022    BUN 16 03/11/2022    CREATININE 1.09 03/11/2022    GLUCOSE 224 03/15/2022    CALCIUM 9.1 03/11/2022    PROT 7.4 07/09/2021    LABALBU 4.0 07/09/2021 BILITOT <0.2 07/09/2021    ALKPHOS 67 07/09/2021    AST 17 07/09/2021    ALT 28 07/09/2021    LABGLOM >60.0 03/11/2022    GFRAA >60.0 03/11/2022    GLOB 3.4 07/09/2021     Lab Results   Component Value Date    WBC 7.0 07/10/2021    HGB 13.6 (L) 07/10/2021    HCT 40.7 (L) 07/10/2021    MCV 88.8 07/10/2021     07/10/2021     Lab Results   Component Value Date    LABA1C 6.9 (H) 03/11/2022    LABA1C 7.2 (H) 09/10/2021    LABA1C 6.6 (H) 07/10/2021     Lab Results   Component Value Date    HDL 36 (L) 07/10/2021    HDL 44 03/12/2019    HDL 45 02/23/2018    LDLCALC 134 (H) 07/10/2021    LDLCALC 90 03/12/2019    LDLCALC 108 02/23/2018    CHOL 191 07/10/2021    CHOL 150 03/12/2019    CHOL 173 02/23/2018    TRIG 106 07/10/2021    TRIG 82 03/12/2019    TRIG 102 02/23/2018     No results found for: TESTM  Lab Results   Component Value Date    TSH 4.070 (H) 03/11/2022    TSH 2.480 09/10/2021    TSH 1.130 09/15/2020    T4FREE 1.15 03/11/2022    T4FREE 1.79 (H) 09/10/2021    T4FREE 1.65 09/15/2020     No results found for: TPOABS    Review of Systems   Musculoskeletal: Positive for gait problem. Skin: Positive for color change. All other systems reviewed and are negative. Objective:   Physical Exam  Vitals reviewed. Constitutional:       Appearance: Normal appearance. He is obese. HENT:      Head: Normocephalic and atraumatic. Right Ear: External ear normal.      Left Ear: External ear normal.      Nose: Nose normal.   Eyes:      General: No scleral icterus. Right eye: No discharge. Left eye: No discharge. Extraocular Movements: Extraocular movements intact. Conjunctiva/sclera: Conjunctivae normal.   Cardiovascular:      Rate and Rhythm: Normal rate. Pulmonary:      Effort: Pulmonary effort is normal.   Musculoskeletal:         General: Normal range of motion. Arms:       Cervical back: Normal range of motion and neck supple.    Neurological:      General: No focal deficit present. Mental Status: He is alert and oriented to person, place, and time.    Psychiatric:         Mood and Affect: Mood normal.         Behavior: Behavior normal.

## 2022-09-06 DIAGNOSIS — E11.9 TYPE 2 DIABETES MELLITUS WITHOUT COMPLICATION, WITHOUT LONG-TERM CURRENT USE OF INSULIN (HCC): ICD-10-CM

## 2022-09-06 DIAGNOSIS — Z76.0 MEDICATION REFILL: ICD-10-CM

## 2022-09-06 RX ORDER — GLIMEPIRIDE 2 MG/1
TABLET ORAL
Qty: 60 TABLET | Refills: 0 | Status: SHIPPED | OUTPATIENT
Start: 2022-09-06 | End: 2022-09-16 | Stop reason: SDUPTHER

## 2022-09-06 RX ORDER — LISINOPRIL AND HYDROCHLOROTHIAZIDE 20; 12.5 MG/1; MG/1
TABLET ORAL
Qty: 30 TABLET | Refills: 0 | Status: SHIPPED | OUTPATIENT
Start: 2022-09-06 | End: 2022-09-16 | Stop reason: SDUPTHER

## 2022-09-06 RX ORDER — LEVOTHYROXINE SODIUM 137 UG/1
TABLET ORAL
Qty: 30 TABLET | Refills: 0 | Status: SHIPPED | OUTPATIENT
Start: 2022-09-06 | End: 2022-09-16 | Stop reason: SDUPTHER

## 2022-09-15 DIAGNOSIS — Z79.4 TYPE 2 DIABETES MELLITUS WITH HYPERGLYCEMIA, WITH LONG-TERM CURRENT USE OF INSULIN (HCC): ICD-10-CM

## 2022-09-15 DIAGNOSIS — E11.65 TYPE 2 DIABETES MELLITUS WITH HYPERGLYCEMIA, WITH LONG-TERM CURRENT USE OF INSULIN (HCC): ICD-10-CM

## 2022-09-15 DIAGNOSIS — E03.9 HYPOTHYROIDISM, UNSPECIFIED TYPE: ICD-10-CM

## 2022-09-15 LAB
ANION GAP SERPL CALCULATED.3IONS-SCNC: 13 MEQ/L (ref 9–15)
BUN BLDV-MCNC: 20 MG/DL (ref 8–23)
CALCIUM SERPL-MCNC: 9.7 MG/DL (ref 8.5–9.9)
CHLORIDE BLD-SCNC: 101 MEQ/L (ref 95–107)
CO2: 26 MEQ/L (ref 20–31)
CREAT SERPL-MCNC: 1.04 MG/DL (ref 0.7–1.2)
GFR AFRICAN AMERICAN: >60
GFR NON-AFRICAN AMERICAN: >60
GLUCOSE FASTING: 66 MG/DL (ref 70–99)
HBA1C MFR BLD: 6.7 % (ref 4.8–5.9)
POTASSIUM SERPL-SCNC: 4.2 MEQ/L (ref 3.4–4.9)
SODIUM BLD-SCNC: 140 MEQ/L (ref 135–144)
T4 FREE: 1.49 NG/DL (ref 0.84–1.68)
TSH REFLEX: 1.69 UIU/ML (ref 0.44–3.86)

## 2022-09-16 ENCOUNTER — OFFICE VISIT (OUTPATIENT)
Dept: ENDOCRINOLOGY | Age: 82
End: 2022-09-16
Payer: MEDICARE

## 2022-09-16 VITALS
HEART RATE: 71 BPM | OXYGEN SATURATION: 95 % | BODY MASS INDEX: 31.56 KG/M2 | SYSTOLIC BLOOD PRESSURE: 116 MMHG | DIASTOLIC BLOOD PRESSURE: 68 MMHG | HEIGHT: 72 IN | WEIGHT: 233 LBS

## 2022-09-16 DIAGNOSIS — E03.9 HYPOTHYROIDISM, UNSPECIFIED TYPE: Primary | ICD-10-CM

## 2022-09-16 DIAGNOSIS — Z76.0 MEDICATION REFILL: ICD-10-CM

## 2022-09-16 DIAGNOSIS — E11.9 TYPE 2 DIABETES MELLITUS WITHOUT COMPLICATION, WITHOUT LONG-TERM CURRENT USE OF INSULIN (HCC): ICD-10-CM

## 2022-09-16 LAB
CHP ED QC CHECK: NORMAL
GLUCOSE BLD-MCNC: 155 MG/DL

## 2022-09-16 PROCEDURE — G8417 CALC BMI ABV UP PARAM F/U: HCPCS | Performed by: INTERNAL MEDICINE

## 2022-09-16 PROCEDURE — 99213 OFFICE O/P EST LOW 20 MIN: CPT | Performed by: INTERNAL MEDICINE

## 2022-09-16 PROCEDURE — 82962 GLUCOSE BLOOD TEST: CPT | Performed by: INTERNAL MEDICINE

## 2022-09-16 PROCEDURE — G8427 DOCREV CUR MEDS BY ELIG CLIN: HCPCS | Performed by: INTERNAL MEDICINE

## 2022-09-16 PROCEDURE — 3044F HG A1C LEVEL LT 7.0%: CPT | Performed by: INTERNAL MEDICINE

## 2022-09-16 PROCEDURE — 1123F ACP DISCUSS/DSCN MKR DOCD: CPT | Performed by: INTERNAL MEDICINE

## 2022-09-16 PROCEDURE — 1036F TOBACCO NON-USER: CPT | Performed by: INTERNAL MEDICINE

## 2022-09-16 RX ORDER — LISINOPRIL AND HYDROCHLOROTHIAZIDE 20; 12.5 MG/1; MG/1
TABLET ORAL
Qty: 30 TABLET | Refills: 5 | Status: SHIPPED | OUTPATIENT
Start: 2022-09-16

## 2022-09-16 RX ORDER — CLOPIDOGREL BISULFATE 75 MG/1
TABLET ORAL
Qty: 30 TABLET | Refills: 6 | Status: SHIPPED | OUTPATIENT
Start: 2022-09-16

## 2022-09-16 RX ORDER — GLIMEPIRIDE 2 MG/1
TABLET ORAL
Qty: 60 TABLET | Refills: 5 | Status: SHIPPED | OUTPATIENT
Start: 2022-09-16

## 2022-09-16 RX ORDER — LEVOTHYROXINE SODIUM 137 UG/1
TABLET ORAL
Qty: 30 TABLET | Refills: 5 | Status: SHIPPED | OUTPATIENT
Start: 2022-09-16

## 2022-09-16 NOTE — PROGRESS NOTES
9/16/2022    Assessment:       Diagnosis Orders   1. Hypothyroidism, unspecified type  TSH with Reflex    T4, Free      2. Medication refill  levothyroxine (EUTHYROX) 137 MCG tablet    metFORMIN (GLUCOPHAGE) 1000 MG tablet      3. Type 2 diabetes mellitus without complication, without long-term current use of insulin (HCC)  metFORMIN (GLUCOPHAGE) 1000 MG tablet            PLAN:     Orders Placed This Encounter   Procedures    TSH with Reflex     Standing Status:   Future     Standing Expiration Date:   9/16/2023    T4, Free     Standing Status:   Future     Standing Expiration Date:   9/16/2023    Hemoglobin A1C     Standing Status:   Future     Standing Expiration Date:   6/24/3762    Basic Metabolic Panel, Fasting     Standing Status:   Future     Standing Expiration Date:   9/16/2023    POCT Glucose     Orders Placed This Encounter   Medications    glimepiride (AMARYL) 2 MG tablet     Sig: bid     Dispense:  60 tablet     Refill:  5    levothyroxine (EUTHYROX) 137 MCG tablet     Sig: Take 1 tablet by mouth once daily     Dispense:  30 tablet     Refill:  5    lisinopril-hydroCHLOROthiazide (PRINZIDE;ZESTORETIC) 20-12.5 MG per tablet     Sig: Once a day     Dispense:  30 tablet     Refill:  5    metFORMIN (GLUCOPHAGE) 1000 MG tablet     Sig: bid     Dispense:  60 tablet     Refill:  5    clopidogrel (PLAVIX) 75 MG tablet     Sig: Take 1 tablet by mouth once daily     Dispense:  30 tablet     Refill:  06         Orders Placed This Encounter   Procedures    POCT Glucose     No orders of the defined types were placed in this encounter. No follow-ups on file.   Subjective:     Chief Complaint   Patient presents with    Diabetes    Hypothyroidism     Vitals:    09/16/22 0921   BP: 116/68   Pulse: 71   SpO2: 95%   Weight: 233 lb (105.7 kg)   Height: 6' (1.829 m)     Wt Readings from Last 3 Encounters:   09/16/22 233 lb (105.7 kg)   03/15/22 239 lb (108.4 kg)   09/14/21 238 lb (108 kg)     BP Readings from Last 3 Encounters:   09/16/22 116/68   03/15/22 131/63   09/14/21 (!) 140/64     Follow-up on type 2 diabetes history of CVA history of hypothyroidism lab review overall blood work was stable overall blood sugars close to 150 denies any hypoglycemia requesting refills  Hemoglobin A1c was 6.7    Diabetes  He presents for his follow-up diabetic visit. He has type 2 diabetes mellitus. Associated symptoms include fatigue. Pertinent negatives for diabetes include no visual change and no weight loss. Symptoms are stable. Diabetic complications include a CVA. Current diabetic treatment includes oral agent (dual therapy). His overall blood glucose range is 130-140 mg/dl. (Lab Results       Component                Value               Date                       LABA1C                   6.7 (H)             09/15/2022            ) An ACE inhibitor/angiotensin II receptor blocker is being taken. Thyroid Problem  Presents for follow-up visit. Symptoms include fatigue. Patient reports no visual change or weight loss. The symptoms have been stable.    Past Medical History:   Diagnosis Date    Acute CVA (cerebrovascular accident) ([facility] [address] 6/3/2015    Left lacunar in centrum semi-ovale got TPA 5-31-15 Dr Vipin Gunter     Hyperlipidemia     on meds x 3 yrs    Hypertension     on meds x 3 yrs    Hypothyroidism     Obstructive sleep apnea     does not use CPAP    Type II or unspecified type diabetes mellitus without mention of complication, not stated as uncontrolled      Past Surgical History:   Procedure Laterality Date    DENTAL SURGERY      SEPTOPLASTY Bilateral 1/24/2019    SEPTOPLASTY, MICRODEBRIDER ASSISTED TURBINOPLASTY AND OUT-FRACTURING BILATERAL NASAL ENDOSCOPY performed by Catherine García MD at [address] [facility] History     Socioeconomic History    Marital status:      Spouse name: Not on file    Number of children: Not on file    Years of education: Not on file    Highest education level: Not on file   Occupational History    Not on file   Tobacco Use    Smoking status: Former     Packs/day: 1.00     Years: 20.00     Pack years: 20.00     Types: Cigarettes     Start date: 10/9/1968     Quit date:      Years since quittin.7    Smokeless tobacco: Never   Vaping Use    Vaping Use: Never used   Substance and Sexual Activity    Alcohol use: Yes     Comment: socially    Drug use: No    Sexual activity: Not Currently     Partners: Female   Other Topics Concern    Not on file   Social History Narrative         Lives With: Spouse    Type of Home: House    Home Layout: Two level (20 with hand rails)    Home Access: Stairs to enter with rails    Entrance Stairs - Number of Steps: 3    Entrance Stairs - Rails: Right    Bathroom Shower/Tub: Walk-in shower    Bathroom Equipment: Shower chair    Home Equipment:  (no DME)    ADL Assistance: Independent    Homemaking Assistance: Independent    Homemaking Responsibilities: Yes (shared)    Ambulation Assistance: Independent    Transfer Assistance: Independent     Social Determinants of Health     Financial Resource Strain: Not on file   Food Insecurity: Not on file   Transportation Needs: Not on file   Physical Activity: Not on file   Stress: Not on file   Social Connections: Not on file   Intimate Partner Violence: Not on file   Housing Stability: Not on file     Family History   Problem Relation Age of Onset    Early Death Mother     Cancer Mother         lung cancer    No Known Problems Father     No Known Problems Daughter      No Known Allergies    Current Outpatient Medications:     levothyroxine (EUTHYROX) 137 MCG tablet, Take 1 tablet by mouth once daily, Disp: 30 tablet, Rfl: 0    metFORMIN (GLUCOPHAGE) 1000 MG tablet, TAKE 1 TABLET BY MOUTH TWICE DAILY WITH MEALS, Disp: 60 tablet, Rfl: 0    lisinopril-hydroCHLOROthiazide (PRINZIDE;ZESTORETIC) 20-12.5 MG per tablet, Take 1 tablet by mouth once daily, Disp: 30 tablet, Rfl: 0    glimepiride (AMARYL) 2 MG tablet, Take 1 tablet by mouth twice daily, Disp: 60 tablet, Rfl: 0    clopidogrel (PLAVIX) 75 MG tablet, Take 1 tablet by mouth once daily, Disp: 30 tablet, Rfl: 06  Lab Results   Component Value Date     09/15/2022    K 4.2 09/15/2022     09/15/2022    CO2 26 09/15/2022    BUN 20 09/15/2022    CREATININE 1.04 09/15/2022    GLUCOSE 155 09/16/2022    CALCIUM 9.7 09/15/2022    PROT 7.4 07/09/2021    LABALBU 4.0 07/09/2021    BILITOT <0.2 07/09/2021    ALKPHOS 67 07/09/2021    AST 17 07/09/2021    ALT 28 07/09/2021    LABGLOM >60.0 09/15/2022    GFRAA >60.0 09/15/2022    GLOB 3.4 07/09/2021     Lab Results   Component Value Date    WBC 7.0 07/10/2021    HGB 13.6 (L) 07/10/2021    HCT 40.7 (L) 07/10/2021    MCV 88.8 07/10/2021     07/10/2021     Lab Results   Component Value Date    LABA1C 6.7 (H) 09/15/2022    LABA1C 6.9 (H) 03/11/2022    LABA1C 7.2 (H) 09/10/2021     Lab Results   Component Value Date    HDL 36 (L) 07/10/2021    HDL 44 03/12/2019    HDL 45 02/23/2018    LDLCALC 134 (H) 07/10/2021    LDLCALC 90 03/12/2019    LDLCALC 108 02/23/2018    CHOL 191 07/10/2021    CHOL 150 03/12/2019    CHOL 173 02/23/2018    TRIG 106 07/10/2021    TRIG 82 03/12/2019    TRIG 102 02/23/2018     No results found for: TESTM  Lab Results   Component Value Date    TSH 4.070 (H) 03/11/2022    TSH 2.480 09/10/2021    TSH 1.130 09/15/2020    TSHREFLEX 1.690 09/15/2022    T4FREE 1.49 09/15/2022    T4FREE 1.15 03/11/2022    T4FREE 1.79 (H) 09/10/2021     No results found for: TPOABS    Review of Systems   Constitutional:  Positive for fatigue. Negative for weight loss. Eyes: Negative. Cardiovascular: Negative. Endocrine: Negative. Neurological: Negative. Psychiatric/Behavioral:  Positive for dysphoric mood. All other systems reviewed and are negative. Objective:   Physical Exam  Vitals reviewed. Constitutional:       General: He is not in acute distress. Appearance: Normal appearance. He is obese.    HENT:      Head: Normocephalic and atraumatic. Right Ear: External ear normal.      Left Ear: External ear normal.      Nose: Nose normal.   Eyes:      General: No scleral icterus. Right eye: No discharge. Left eye: No discharge. Extraocular Movements: Extraocular movements intact. Conjunctiva/sclera: Conjunctivae normal.   Cardiovascular:      Rate and Rhythm: Normal rate. Pulmonary:      Effort: Pulmonary effort is normal.   Musculoskeletal:         General: Normal range of motion. Cervical back: Normal range of motion and neck supple. Neurological:      General: No focal deficit present. Mental Status: He is alert and oriented to person, place, and time.    Psychiatric:         Mood and Affect: Mood normal.         Behavior: Behavior normal.

## 2022-09-22 ASSESSMENT — ENCOUNTER SYMPTOMS
VISUAL CHANGE: 0
EYES NEGATIVE: 1

## 2023-03-06 RX ORDER — LISINOPRIL AND HYDROCHLOROTHIAZIDE 20; 12.5 MG/1; MG/1
TABLET ORAL
Qty: 30 TABLET | Refills: 3 | Status: SHIPPED | OUTPATIENT
Start: 2023-03-06

## 2023-03-09 DIAGNOSIS — E11.9 TYPE 2 DIABETES MELLITUS WITHOUT COMPLICATION, WITHOUT LONG-TERM CURRENT USE OF INSULIN (HCC): ICD-10-CM

## 2023-03-09 DIAGNOSIS — E03.9 HYPOTHYROIDISM, UNSPECIFIED TYPE: ICD-10-CM

## 2023-03-09 LAB
ANION GAP SERPL CALCULATED.3IONS-SCNC: 9 MEQ/L (ref 9–15)
BUN BLDV-MCNC: 16 MG/DL (ref 8–23)
CALCIUM SERPL-MCNC: 9.3 MG/DL (ref 8.5–9.9)
CHLORIDE BLD-SCNC: 101 MEQ/L (ref 95–107)
CO2: 29 MEQ/L (ref 20–31)
CREAT SERPL-MCNC: 1.09 MG/DL (ref 0.7–1.2)
GFR SERPL CREATININE-BSD FRML MDRD: >60 ML/MIN/{1.73_M2}
GLUCOSE FASTING: 158 MG/DL (ref 70–99)
HBA1C MFR BLD: 6.9 % (ref 4.8–5.9)
POTASSIUM SERPL-SCNC: 4.6 MEQ/L (ref 3.4–4.9)
SODIUM BLD-SCNC: 139 MEQ/L (ref 135–144)
T4 FREE: 1.16 NG/DL (ref 0.84–1.68)
TSH REFLEX: 5.27 UIU/ML (ref 0.44–3.86)

## 2023-03-17 ENCOUNTER — OFFICE VISIT (OUTPATIENT)
Dept: ENDOCRINOLOGY | Age: 83
End: 2023-03-17

## 2023-03-17 VITALS
DIASTOLIC BLOOD PRESSURE: 74 MMHG | SYSTOLIC BLOOD PRESSURE: 133 MMHG | HEIGHT: 72 IN | HEART RATE: 83 BPM | OXYGEN SATURATION: 95 % | WEIGHT: 237 LBS | BODY MASS INDEX: 32.1 KG/M2

## 2023-03-17 DIAGNOSIS — E03.9 HYPOTHYROIDISM, UNSPECIFIED TYPE: Primary | ICD-10-CM

## 2023-03-17 DIAGNOSIS — Z76.0 MEDICATION REFILL: ICD-10-CM

## 2023-03-17 DIAGNOSIS — E11.9 TYPE 2 DIABETES MELLITUS WITHOUT COMPLICATION, WITHOUT LONG-TERM CURRENT USE OF INSULIN (HCC): ICD-10-CM

## 2023-03-17 DIAGNOSIS — E78.5 HYPERLIPIDEMIA, UNSPECIFIED HYPERLIPIDEMIA TYPE: ICD-10-CM

## 2023-03-17 LAB
CHP ED QC CHECK: NORMAL
GLUCOSE BLD-MCNC: 212 MG/DL

## 2023-03-17 RX ORDER — LISINOPRIL AND HYDROCHLOROTHIAZIDE 20; 12.5 MG/1; MG/1
TABLET ORAL
Qty: 30 TABLET | Refills: 3 | Status: SHIPPED | OUTPATIENT
Start: 2023-03-17

## 2023-03-17 RX ORDER — CLOPIDOGREL BISULFATE 75 MG/1
TABLET ORAL
Qty: 30 TABLET | Refills: 6 | Status: SHIPPED | OUTPATIENT
Start: 2023-03-17

## 2023-03-17 RX ORDER — GLIMEPIRIDE 2 MG/1
TABLET ORAL
Qty: 60 TABLET | Refills: 5 | Status: SHIPPED | OUTPATIENT
Start: 2023-03-17

## 2023-03-17 RX ORDER — LEVOTHYROXINE SODIUM 137 UG/1
TABLET ORAL
Qty: 30 TABLET | Refills: 5 | Status: SHIPPED | OUTPATIENT
Start: 2023-03-17

## 2023-03-17 NOTE — PROGRESS NOTES
3/17/2023    Assessment:       Diagnosis Orders   1. Hypothyroidism, unspecified type        2. Type 2 diabetes mellitus without complication, without long-term current use of insulin ([facility])  POCT Glucose      3. Medication refill        4. Hyperlipidemia, unspecified hyperlipidemia type              PLAN:     Orders Placed This Encounter   Procedures    Hemoglobin A1C     Standing Status:   Future     Standing Expiration Date:   9/40/2444    Basic Metabolic Panel     Standing Status:   Future     Standing Expiration Date:   3/17/2024    T4, Free     Standing Status:   Future     Standing Expiration Date:   3/17/2024    TSH with Reflex     Standing Status:   Future     Standing Expiration Date:   3/17/2024    [facility]     Referral Priority:   Routine     Referral Type:   Eval and Treat     Referral Reason:   Specialty Services Required     Number of Visits Requested:   1    POCT Glucose     Orders Placed This Encounter   Medications    glimepiride (AMARYL) 2 MG tablet     Sig: bid     Dispense:  60 tablet     Refill:  5    levothyroxine (EUTHYROX) 137 MCG tablet     Sig: Take 1 tablet by mouth once daily     Dispense:  30 tablet     Refill:  5    lisinopril-hydroCHLOROthiazide (PRINZIDE;ZESTORETIC) 20-12.5 MG per tablet     Sig: Take 1 tablet by mouth once daily     Dispense:  30 tablet     Refill:  3    metFORMIN (GLUCOPHAGE) 1000 MG tablet     Sig: bid     Dispense:  60 tablet     Refill:  5    clopidogrel (PLAVIX) 75 MG tablet     Sig: Take 1 tablet by mouth once daily     Dispense:  30 tablet     Refill:  06         Orders Placed This Encounter   Procedures    POCT Glucose     No orders of the defined types were placed in this encounter. No follow-ups on file.   Subjective:     Chief Complaint   Patient presents with    Diabetes    Hypothyroidism     Vitals:    03/17/23 0921   BP: 133/74   Pulse: 83   SpO2: 95%   Weight: 237 lb (107.5 kg)   Height: 6' (1.829 m)     Wt Readings from Last 3 Encounters: 03/17/23 237 lb (107.5 kg)   09/16/22 233 lb (105.7 kg)   03/15/22 239 lb (108.4 kg)     BP Readings from Last 3 Encounters:   03/17/23 133/74   09/16/22 116/68   03/15/22 131/63     6 months follow-up on type 2 diabetes hyperlipidemia hypothyroidism patient's labs were reviewed A1c stable continue metformin and glimepiride requesting refills history of CVA    Diabetes  He presents for his follow-up diabetic visit. He has type 2 diabetes mellitus. Associated symptoms include fatigue. Pertinent negatives for diabetes include no polydipsia, no polyuria, no visual change and no weight loss. Diabetic complications include a CVA. Current diabetic treatment includes oral agent (dual therapy). His overall blood glucose range is 130-140 mg/dl. (Hemoglobin A1C       Date                     Value               Ref Range           Status                03/09/2023               6.9 (H)             4.8 - 5.9 %         Final            ----------  )   Thyroid Problem  Presents for follow-up visit. Symptoms include fatigue. Patient reports no visual change or weight loss. The symptoms have been stable.    Past Medical History:   Diagnosis Date    Acute CVA (cerebrovascular accident) ([address] 6/3/2015    Left lacunar in centrum semi-ovale got TPA 5-31-15 Dr Lobito Vidales     Hyperlipidemia     on meds x 3 yrs    Hypertension     on meds x 3 yrs    Hypothyroidism     Obstructive sleep apnea     does not use CPAP    Type II or unspecified type diabetes mellitus without mention of complication, not stated as uncontrolled      Past Surgical History:   Procedure Laterality Date    DENTAL SURGERY      SEPTOPLASTY Bilateral 1/24/2019    SEPTOPLASTY, MICRODEBRIDER ASSISTED TURBINOPLASTY AND OUT-FRACTURING BILATERAL NASAL ENDOSCOPY performed by Rudi Cook MD at [facility] History     Socioeconomic History    Marital status:      Spouse name: Not on file    Number of children: Not on file    Years of education: Not on file    Highest education level: Not on file   Occupational History    Not on file   Tobacco Use    Smoking status: Former     Packs/day: 1.00     Years: 20.00     Pack years: 20.00     Types: Cigarettes     Start date: 10/9/1968     Quit date:      Years since quittin.2    Smokeless tobacco: Never   Vaping Use    Vaping Use: Never used   Substance and Sexual Activity    Alcohol use: Yes     Comment: socially    Drug use: No    Sexual activity: Not Currently     Partners: Female   Other Topics Concern    Not on file   Social History Narrative         Lives With: Spouse    Type of Home: House    Home Layout: Two level (20 with hand rails)    Home Access: Stairs to enter with rails    Entrance Stairs - Number of Steps: 3    Entrance Stairs - Rails: Right    Bathroom Shower/Tub: Walk-in shower    Bathroom Equipment: Shower chair    Home Equipment:  (no DME)    ADL Assistance: Independent    Homemaking Assistance: Independent    Homemaking Responsibilities: Yes (shared)    Ambulation Assistance: Independent    Transfer Assistance: Independent     Social Determinants of Health     Financial Resource Strain: Not on file   Food Insecurity: Not on file   Transportation Needs: Not on file   Physical Activity: Not on file   Stress: Not on file   Social Connections: Not on file   Intimate Partner Violence: Not on file   Housing Stability: Not on file     Family History   Problem Relation Age of Onset    Early Death Mother     Cancer Mother         lung cancer    No Known Problems Father     No Known Problems Daughter      No Known Allergies    Current Outpatient Medications:     lisinopril-hydroCHLOROthiazide (PRINZIDE;ZESTORETIC) 20-12.5 MG per tablet, Take 1 tablet by mouth once daily, Disp: 30 tablet, Rfl: 3    glimepiride (AMARYL) 2 MG tablet, bid, Disp: 60 tablet, Rfl: 5    levothyroxine (EUTHYROX) 137 MCG tablet, Take 1 tablet by mouth once daily, Disp: 30 tablet, Rfl: 5    metFORMIN (GLUCOPHAGE) 1000 MG tablet, bid, Disp: 60 tablet, Rfl: 5    clopidogrel (PLAVIX) 75 MG tablet, Take 1 tablet by mouth once daily, Disp: 30 tablet, Rfl: 06  Lab Results   Component Value Date     03/09/2023    K 4.6 03/09/2023     03/09/2023    CO2 29 03/09/2023    BUN 16 03/09/2023    CREATININE 1.09 03/09/2023    GLUCOSE 212 03/17/2023    CALCIUM 9.3 03/09/2023    PROT 7.4 07/09/2021    LABALBU 4.0 07/09/2021    BILITOT <0.2 07/09/2021    ALKPHOS 67 07/09/2021    AST 17 07/09/2021    ALT 28 07/09/2021    LABGLOM >60.0 03/09/2023    GFRAA >60.0 09/15/2022    GLOB 3.4 07/09/2021     Lab Results   Component Value Date    WBC 7.0 07/10/2021    HGB 13.6 (L) 07/10/2021    HCT 40.7 (L) 07/10/2021    MCV 88.8 07/10/2021     07/10/2021     Lab Results   Component Value Date    LABA1C 6.9 (H) 03/09/2023    LABA1C 6.7 (H) 09/15/2022    LABA1C 6.9 (H) 03/11/2022     Lab Results   Component Value Date    HDL 36 (L) 07/10/2021    HDL 44 03/12/2019    HDL 45 02/23/2018    LDLCALC 134 (H) 07/10/2021    LDLCALC 90 03/12/2019    LDLCALC 108 02/23/2018    CHOL 191 07/10/2021    CHOL 150 03/12/2019    CHOL 173 02/23/2018    TRIG 106 07/10/2021    TRIG 82 03/12/2019    TRIG 102 02/23/2018     No results found for: TESTM  Lab Results   Component Value Date    TSH 4.070 (H) 03/11/2022    TSH 2.480 09/10/2021    TSH 1.130 09/15/2020    TSHREFLEX 5.270 (H) 03/09/2023    TSHREFLEX 1.690 09/15/2022    T4FREE 1.16 03/09/2023    T4FREE 1.49 09/15/2022    T4FREE 1.15 03/11/2022     No results found for: TPOABS    Review of Systems   Constitutional:  Positive for fatigue. Negative for weight loss.   Eyes: Negative.    Cardiovascular: Negative.    Endocrine: Negative.  Negative for polydipsia and polyuria.   Psychiatric/Behavioral:  Positive for dysphoric mood.    All other systems reviewed and are negative.    Objective:   Physical Exam  Vitals reviewed.   Constitutional:       General: He is not in acute distress.     Appearance: Normal  appearance. He is obese. HENT:      Head: Normocephalic and atraumatic. Right Ear: External ear normal.      Left Ear: External ear normal.      Nose: Nose normal.   Eyes:      General: No scleral icterus. Right eye: No discharge. Left eye: No discharge. Extraocular Movements: Extraocular movements intact. Conjunctiva/sclera: Conjunctivae normal.   Cardiovascular:      Rate and Rhythm: Normal rate. Pulmonary:      Effort: Pulmonary effort is normal.   Musculoskeletal:         General: Normal range of motion. Cervical back: Normal range of motion and neck supple. Right lower leg: No edema. Left lower leg: No edema. Neurological:      General: No focal deficit present. Mental Status: He is alert and oriented to person, place, and time. Psychiatric:         Mood and Affect: Mood is depressed.          Behavior: Behavior normal.

## 2023-03-19 ASSESSMENT — ENCOUNTER SYMPTOMS
EYES NEGATIVE: 1
VISUAL CHANGE: 0

## 2023-06-21 NOTE — PLAN OF CARE
Problem: Falls - Risk of:  Goal: Will remain free from falls  Will remain free from falls   Outcome: Ongoing    Goal: Absence of physical injury  Absence of physical injury   Outcome: Ongoing      Problem: Safety:  Goal: Free from accidental physical injury  Free from accidental physical injury   Outcome: Ongoing    Goal: Free from intentional harm  Free from intentional harm   Outcome: Ongoing      Problem: Pain:  Goal: Patient's pain/discomfort is manageable  Patient's pain/discomfort is manageable   Outcome: Ongoing ---

## 2023-09-06 DIAGNOSIS — E11.9 TYPE 2 DIABETES MELLITUS WITHOUT COMPLICATION, WITHOUT LONG-TERM CURRENT USE OF INSULIN (HCC): ICD-10-CM

## 2023-09-06 DIAGNOSIS — E03.9 HYPOTHYROIDISM, UNSPECIFIED TYPE: ICD-10-CM

## 2023-09-06 LAB
ANION GAP SERPL CALCULATED.3IONS-SCNC: 12 MEQ/L (ref 9–15)
BUN SERPL-MCNC: 20 MG/DL (ref 8–23)
CALCIUM SERPL-MCNC: 8.9 MG/DL (ref 8.5–9.9)
CHLORIDE SERPL-SCNC: 97 MEQ/L (ref 95–107)
CO2 SERPL-SCNC: 25 MEQ/L (ref 20–31)
CREAT SERPL-MCNC: 1.2 MG/DL (ref 0.7–1.2)
GLUCOSE SERPL-MCNC: 142 MG/DL (ref 70–99)
HBA1C MFR BLD: 6.9 % (ref 4.8–5.9)
POTASSIUM SERPL-SCNC: 4.8 MEQ/L (ref 3.4–4.9)
SODIUM SERPL-SCNC: 134 MEQ/L (ref 135–144)
T4 FREE SERPL-MCNC: 1.36 NG/DL (ref 0.84–1.68)
TSH REFLEX: 6.4 UIU/ML (ref 0.44–3.86)

## 2023-09-09 DIAGNOSIS — Z76.0 MEDICATION REFILL: ICD-10-CM

## 2023-09-11 RX ORDER — LEVOTHYROXINE SODIUM 137 UG/1
TABLET ORAL
Qty: 30 TABLET | Refills: 3 | Status: SHIPPED | OUTPATIENT
Start: 2023-09-11

## 2023-09-15 ENCOUNTER — OFFICE VISIT (OUTPATIENT)
Dept: ENDOCRINOLOGY | Age: 83
End: 2023-09-15

## 2023-09-15 VITALS
SYSTOLIC BLOOD PRESSURE: 122 MMHG | WEIGHT: 238 LBS | HEIGHT: 72 IN | OXYGEN SATURATION: 95 % | HEART RATE: 68 BPM | BODY MASS INDEX: 32.23 KG/M2 | DIASTOLIC BLOOD PRESSURE: 71 MMHG

## 2023-09-15 DIAGNOSIS — Z23 ENCOUNTER FOR IMMUNIZATION: ICD-10-CM

## 2023-09-15 DIAGNOSIS — Z76.0 MEDICATION REFILL: ICD-10-CM

## 2023-09-15 DIAGNOSIS — E11.9 TYPE 2 DIABETES MELLITUS WITHOUT COMPLICATION, WITHOUT LONG-TERM CURRENT USE OF INSULIN (HCC): ICD-10-CM

## 2023-09-15 DIAGNOSIS — E03.9 HYPOTHYROIDISM, UNSPECIFIED TYPE: Primary | ICD-10-CM

## 2023-09-15 LAB
CHP ED QC CHECK: NORMAL
GLUCOSE BLD-MCNC: 188 MG/DL

## 2023-09-15 RX ORDER — LISINOPRIL AND HYDROCHLOROTHIAZIDE 20; 12.5 MG/1; MG/1
TABLET ORAL
Qty: 30 TABLET | Refills: 3 | Status: SHIPPED | OUTPATIENT
Start: 2023-09-15

## 2023-09-15 RX ORDER — GLIMEPIRIDE 2 MG/1
TABLET ORAL
Qty: 60 TABLET | Refills: 5 | Status: SHIPPED | OUTPATIENT
Start: 2023-09-15

## 2023-09-15 RX ORDER — CLOPIDOGREL BISULFATE 75 MG/1
TABLET ORAL
Qty: 30 TABLET | Refills: 6 | Status: SHIPPED | OUTPATIENT
Start: 2023-09-15

## 2023-09-15 NOTE — PROGRESS NOTES
Vaccine Information Sheet, \"Influenza - Inactivated\"  given to [employer], or parent/legal guardian of  Laci Kruger and verbalized understanding. Patient responses:    Have you ever had a reaction to a flu vaccine? No  Are you able to eat eggs without adverse effects? Yes  Do you have any current illness? No  Have you ever had Guillian Hingham Syndrome? No    Flu vaccine given per order. Please see immunization tab.
Appearance: Normal appearance. He is obese. HENT:      Head: Normocephalic and atraumatic. Right Ear: External ear normal.      Left Ear: External ear normal.      Nose: Nose normal.   Eyes:      General: No scleral icterus. Right eye: No discharge. Left eye: No discharge. Extraocular Movements: Extraocular movements intact. Conjunctiva/sclera: Conjunctivae normal.   Cardiovascular:      Rate and Rhythm: Normal rate. Pulmonary:      Effort: Pulmonary effort is normal.   Musculoskeletal:         General: Normal range of motion. Cervical back: Normal range of motion and neck supple. Neurological:      General: No focal deficit present. Mental Status: He is alert and oriented to person, place, and time.    Psychiatric:         Mood and Affect: Mood normal.         Behavior: Behavior normal.

## 2023-09-21 ASSESSMENT — ENCOUNTER SYMPTOMS: EYES NEGATIVE: 1

## 2024-01-02 ENCOUNTER — OFFICE VISIT (OUTPATIENT)
Dept: ENDOCRINOLOGY | Age: 84
End: 2024-01-02
Payer: MEDICARE

## 2024-01-02 VITALS
DIASTOLIC BLOOD PRESSURE: 73 MMHG | WEIGHT: 237 LBS | SYSTOLIC BLOOD PRESSURE: 126 MMHG | OXYGEN SATURATION: 93 % | BODY MASS INDEX: 32.1 KG/M2 | HEART RATE: 69 BPM | HEIGHT: 72 IN

## 2024-01-02 DIAGNOSIS — N40.0 BENIGN PROSTATIC HYPERPLASIA, UNSPECIFIED WHETHER LOWER URINARY TRACT SYMPTOMS PRESENT: ICD-10-CM

## 2024-01-02 DIAGNOSIS — E11.9 TYPE 2 DIABETES MELLITUS WITHOUT COMPLICATION, WITHOUT LONG-TERM CURRENT USE OF INSULIN (HCC): ICD-10-CM

## 2024-01-02 DIAGNOSIS — E03.9 HYPOTHYROIDISM, UNSPECIFIED TYPE: ICD-10-CM

## 2024-01-02 DIAGNOSIS — E03.9 HYPOTHYROIDISM, UNSPECIFIED TYPE: Primary | ICD-10-CM

## 2024-01-02 LAB
ANION GAP SERPL CALCULATED.3IONS-SCNC: 11 MEQ/L (ref 9–15)
BUN SERPL-MCNC: 24 MG/DL (ref 8–23)
CALCIUM SERPL-MCNC: 9.2 MG/DL (ref 8.5–9.9)
CHLORIDE SERPL-SCNC: 100 MEQ/L (ref 95–107)
CHP ED QC CHECK: NORMAL
CO2 SERPL-SCNC: 28 MEQ/L (ref 20–31)
CREAT SERPL-MCNC: 1.16 MG/DL (ref 0.7–1.2)
GLUCOSE BLD-MCNC: 131 MG/DL
GLUCOSE SERPL-MCNC: 121 MG/DL (ref 70–99)
HBA1C MFR BLD: 7 % (ref 4.8–5.9)
POTASSIUM SERPL-SCNC: 4.1 MEQ/L (ref 3.4–4.9)
PSA SERPL-MCNC: 1.01 NG/ML (ref 0–4)
SODIUM SERPL-SCNC: 139 MEQ/L (ref 135–144)
T4 FREE SERPL-MCNC: 1.22 NG/DL (ref 0.84–1.68)
TSH REFLEX: 3.67 UIU/ML (ref 0.44–3.86)

## 2024-01-02 PROCEDURE — 1036F TOBACCO NON-USER: CPT | Performed by: INTERNAL MEDICINE

## 2024-01-02 PROCEDURE — G8484 FLU IMMUNIZE NO ADMIN: HCPCS | Performed by: INTERNAL MEDICINE

## 2024-01-02 PROCEDURE — 82962 GLUCOSE BLOOD TEST: CPT | Performed by: INTERNAL MEDICINE

## 2024-01-02 PROCEDURE — G8427 DOCREV CUR MEDS BY ELIG CLIN: HCPCS | Performed by: INTERNAL MEDICINE

## 2024-01-02 PROCEDURE — 3074F SYST BP LT 130 MM HG: CPT | Performed by: INTERNAL MEDICINE

## 2024-01-02 PROCEDURE — 99213 OFFICE O/P EST LOW 20 MIN: CPT | Performed by: INTERNAL MEDICINE

## 2024-01-02 PROCEDURE — 3078F DIAST BP <80 MM HG: CPT | Performed by: INTERNAL MEDICINE

## 2024-01-02 PROCEDURE — 83036 HEMOGLOBIN GLYCOSYLATED A1C: CPT | Performed by: INTERNAL MEDICINE

## 2024-01-02 PROCEDURE — 36415 COLL VENOUS BLD VENIPUNCTURE: CPT | Performed by: INTERNAL MEDICINE

## 2024-01-02 PROCEDURE — 3051F HG A1C>EQUAL 7.0%<8.0%: CPT | Performed by: INTERNAL MEDICINE

## 2024-01-02 PROCEDURE — G8417 CALC BMI ABV UP PARAM F/U: HCPCS | Performed by: INTERNAL MEDICINE

## 2024-01-02 PROCEDURE — 1123F ACP DISCUSS/DSCN MKR DOCD: CPT | Performed by: INTERNAL MEDICINE

## 2024-01-02 RX ORDER — TAMSULOSIN HYDROCHLORIDE 0.4 MG/1
0.4 CAPSULE ORAL DAILY
Qty: 90 CAPSULE | Refills: 1 | Status: SHIPPED | OUTPATIENT
Start: 2024-01-02

## 2024-01-02 NOTE — PROGRESS NOTES
09/10/2021    TSH 1.130 09/15/2020    TSHREFLEX 6.400 (H) 09/06/2023    TSHREFLEX 5.270 (H) 03/09/2023    TSHREFLEX 1.690 09/15/2022    T4FREE 1.36 09/06/2023    T4FREE 1.16 03/09/2023    T4FREE 1.49 09/15/2022     No results found for: \"TPOABS\"    Review of Systems   Cardiovascular: Negative.    Endocrine: Positive for polyuria. Negative for cold intolerance and heat intolerance.   Genitourinary:  Positive for frequency, nocturia and urgency.   All other systems reviewed and are negative.      Objective:   Physical Exam  Vitals reviewed.   Constitutional:       General: He is not in acute distress.     Appearance: Normal appearance. He is obese.   HENT:      Head: Normocephalic and atraumatic.      Right Ear: External ear normal.      Left Ear: External ear normal.      Nose: Nose normal.   Eyes:      General: No scleral icterus.        Right eye: No discharge.         Left eye: No discharge.      Extraocular Movements: Extraocular movements intact.      Conjunctiva/sclera: Conjunctivae normal.   Cardiovascular:      Rate and Rhythm: Normal rate.   Pulmonary:      Effort: Pulmonary effort is normal.   Musculoskeletal:         General: Normal range of motion.      Cervical back: Normal range of motion and neck supple.   Neurological:      General: No focal deficit present.      Mental Status: He is alert and oriented to person, place, and time.   Psychiatric:         Mood and Affect: Mood normal.         Behavior: Behavior normal.

## 2024-01-09 DIAGNOSIS — Z76.0 MEDICATION REFILL: ICD-10-CM

## 2024-01-09 RX ORDER — LEVOTHYROXINE SODIUM 137 UG/1
TABLET ORAL
Qty: 30 TABLET | Refills: 3 | Status: SHIPPED | OUTPATIENT
Start: 2024-01-09

## 2024-02-14 ENCOUNTER — OFFICE VISIT (OUTPATIENT)
Dept: UROLOGY | Age: 84
End: 2024-02-14

## 2024-02-14 VITALS
HEART RATE: 72 BPM | WEIGHT: 230 LBS | HEIGHT: 72 IN | DIASTOLIC BLOOD PRESSURE: 73 MMHG | SYSTOLIC BLOOD PRESSURE: 128 MMHG | BODY MASS INDEX: 31.15 KG/M2

## 2024-02-14 DIAGNOSIS — N40.0 BENIGN PROSTATIC HYPERPLASIA, UNSPECIFIED WHETHER LOWER URINARY TRACT SYMPTOMS PRESENT: Primary | ICD-10-CM

## 2024-02-14 DIAGNOSIS — R35.1 NOCTURIA: ICD-10-CM

## 2024-02-14 LAB
BILIRUBIN, POC: NORMAL
BLOOD URINE, POC: NORMAL
CLARITY, POC: CLEAR
COLOR, POC: YELLOW
GLUCOSE URINE, POC: NORMAL
KETONES, POC: NORMAL
LEUKOCYTE EST, POC: NORMAL
NITRITE, POC: NORMAL
PH, POC: 7
PROTEIN, POC: NORMAL
SPECIFIC GRAVITY, POC: 1.01
UROBILINOGEN, POC: 0.2

## 2024-02-14 NOTE — PROGRESS NOTES
Subjective:      Patient ID: Nikolas Kruger is a 83 y.o. male    HPI 83 year old male who presents for complaints of BPH with lower urinary tract symptoms. He complains of nocturia. He was started on flomax last month per Dr. Cevallos. He states that prior to the flomax he was getting up 3-4 times per night, now he only gets up once per night. He complains of urgency with occasional small amount of urge incontinence. He denies gross hematuria and dysuria. PSA on 24 was 1.01. max flow 5 ml/s, average flow 2 ml/s,  cc's.    Past Medical History:   Diagnosis Date    Acute CVA (cerebrovascular accident) ([facility]) 6/3/2015    Left lacunar in centrum semi-ovale got TPA 5-31-15 Dr Childress     Arthritis     Hyperlipidemia     on meds x 3 yrs    Hypertension     on meds x 3 yrs    Hypothyroidism     Obstructive sleep apnea     does not use CPAP    Type II or unspecified type diabetes mellitus without mention of complication, not stated as uncontrolled      Past Surgical History:   Procedure Laterality Date    DENTAL SURGERY      SEPTOPLASTY Bilateral 2019    SEPTOPLASTY, MICRODEBRIDER ASSISTED TURBINOPLASTY AND OUT-FRACTURING BILATERAL NASAL ENDOSCOPY performed by Andrae Simmons MD at [facility]     Social History     Socioeconomic History    Marital status:      Spouse name: None    Number of children: None    Years of education: None    Highest education level: None   Tobacco Use    Smoking status: Former     Current packs/day: 0.00     Average packs/day: 1 pack/day for 20.0 years (20.0 ttl pk-yrs)     Types: Cigarettes     Start date: 10/9/1968     Quit date: 1980     Years since quittin.1    Smokeless tobacco: Never   Vaping Use    Vaping Use: Never used   Substance and Sexual Activity    Alcohol use: Yes     Comment: socially    Drug use: No    Sexual activity: Not Currently     Partners: Female   Social History Narrative         Lives With: Spouse    Type of Home: House    Home Layout: Two level (20

## 2024-03-12 RX ORDER — LISINOPRIL AND HYDROCHLOROTHIAZIDE 20; 12.5 MG/1; MG/1
TABLET ORAL
Qty: 30 TABLET | Refills: 3 | Status: SHIPPED | OUTPATIENT
Start: 2024-03-12

## 2024-04-12 DIAGNOSIS — E03.9 HYPOTHYROIDISM, UNSPECIFIED TYPE: ICD-10-CM

## 2024-04-12 DIAGNOSIS — E11.9 TYPE 2 DIABETES MELLITUS WITHOUT COMPLICATION, WITHOUT LONG-TERM CURRENT USE OF INSULIN (HCC): ICD-10-CM

## 2024-04-12 LAB
ANION GAP SERPL CALCULATED.3IONS-SCNC: 12 MEQ/L (ref 9–15)
BUN SERPL-MCNC: 20 MG/DL (ref 8–23)
CALCIUM SERPL-MCNC: 9.2 MG/DL (ref 8.5–9.9)
CHLORIDE SERPL-SCNC: 100 MEQ/L (ref 95–107)
CO2 SERPL-SCNC: 26 MEQ/L (ref 20–31)
CREAT SERPL-MCNC: 1.14 MG/DL (ref 0.7–1.2)
ESTIMATED AVERAGE GLUCOSE: 151 MG/DL
GLUCOSE SERPL-MCNC: 175 MG/DL (ref 70–99)
HBA1C MFR BLD: 6.9 % (ref 4–6)
POTASSIUM SERPL-SCNC: 4.3 MEQ/L (ref 3.4–4.9)
SODIUM SERPL-SCNC: 138 MEQ/L (ref 135–144)
T4 FREE SERPL-MCNC: 1.15 NG/DL (ref 0.84–1.68)
TSH REFLEX: 7.06 UIU/ML (ref 0.44–3.86)

## 2024-04-16 ENCOUNTER — OFFICE VISIT (OUTPATIENT)
Dept: ENDOCRINOLOGY | Age: 84
End: 2024-04-16
Payer: MEDICARE

## 2024-04-16 VITALS
OXYGEN SATURATION: 92 % | BODY MASS INDEX: 32.23 KG/M2 | HEART RATE: 83 BPM | DIASTOLIC BLOOD PRESSURE: 67 MMHG | SYSTOLIC BLOOD PRESSURE: 113 MMHG | WEIGHT: 238 LBS | HEIGHT: 72 IN

## 2024-04-16 DIAGNOSIS — Z76.0 MEDICATION REFILL: ICD-10-CM

## 2024-04-16 DIAGNOSIS — E03.9 HYPOTHYROIDISM, UNSPECIFIED TYPE: ICD-10-CM

## 2024-04-16 DIAGNOSIS — E11.9 TYPE 2 DIABETES MELLITUS WITHOUT COMPLICATION, WITHOUT LONG-TERM CURRENT USE OF INSULIN (HCC): Primary | ICD-10-CM

## 2024-04-16 LAB
CHP ED QC CHECK: NORMAL
GLUCOSE BLD-MCNC: 211 MG/DL

## 2024-04-16 PROCEDURE — 1036F TOBACCO NON-USER: CPT | Performed by: INTERNAL MEDICINE

## 2024-04-16 PROCEDURE — 1123F ACP DISCUSS/DSCN MKR DOCD: CPT | Performed by: INTERNAL MEDICINE

## 2024-04-16 PROCEDURE — 3078F DIAST BP <80 MM HG: CPT | Performed by: INTERNAL MEDICINE

## 2024-04-16 PROCEDURE — G8427 DOCREV CUR MEDS BY ELIG CLIN: HCPCS | Performed by: INTERNAL MEDICINE

## 2024-04-16 PROCEDURE — 3044F HG A1C LEVEL LT 7.0%: CPT | Performed by: INTERNAL MEDICINE

## 2024-04-16 PROCEDURE — 3074F SYST BP LT 130 MM HG: CPT | Performed by: INTERNAL MEDICINE

## 2024-04-16 PROCEDURE — 82962 GLUCOSE BLOOD TEST: CPT | Performed by: INTERNAL MEDICINE

## 2024-04-16 PROCEDURE — 99213 OFFICE O/P EST LOW 20 MIN: CPT | Performed by: INTERNAL MEDICINE

## 2024-04-16 PROCEDURE — G8417 CALC BMI ABV UP PARAM F/U: HCPCS | Performed by: INTERNAL MEDICINE

## 2024-04-16 RX ORDER — LEVOTHYROXINE SODIUM 0.15 MG/1
150 TABLET ORAL DAILY
Qty: 90 TABLET | Refills: 3 | Status: SHIPPED | OUTPATIENT
Start: 2024-04-16

## 2024-04-16 RX ORDER — TAMSULOSIN HYDROCHLORIDE 0.4 MG/1
0.4 CAPSULE ORAL DAILY
Qty: 90 CAPSULE | Refills: 1 | Status: SHIPPED | OUTPATIENT
Start: 2024-04-16

## 2024-04-16 RX ORDER — LISINOPRIL AND HYDROCHLOROTHIAZIDE 20; 12.5 MG/1; MG/1
1 TABLET ORAL DAILY
Qty: 30 TABLET | Refills: 3 | Status: SHIPPED | OUTPATIENT
Start: 2024-04-16

## 2024-04-16 RX ORDER — CLOPIDOGREL BISULFATE 75 MG/1
TABLET ORAL
Qty: 30 TABLET | Refills: 6 | Status: SHIPPED | OUTPATIENT
Start: 2024-04-16

## 2024-04-16 NOTE — PROGRESS NOTES
4/16/2024    Assessment:       Diagnosis Orders   1. Type 2 diabetes mellitus without complication, without long-term current use of insulin (Formerly [facility])  POCT Glucose      2. Hypothyroidism, unspecified type        3. Medication refill              PLAN:     Orders Placed This Encounter   Procedures    Basic Metabolic Panel     Standing Status:   Future     Standing Expiration Date:   4/16/2025    Hemoglobin A1C     Standing Status:   Future     Standing Expiration Date:   4/16/2025    T4, Free     Standing Status:   Future     Standing Expiration Date:   4/16/2025    TSH with Reflex     Standing Status:   Future     Standing Expiration Date:   4/16/2025    POCT Glucose     Orders Placed This Encounter   Medications    clopidogrel (PLAVIX) 75 MG tablet     Sig: Take 1 tablet by mouth once daily     Dispense:  30 tablet     Refill:  06    tamsulosin (FLOMAX) 0.4 MG capsule     Sig: Take 1 capsule by mouth daily     Dispense:  90 capsule     Refill:  1    levothyroxine (SYNTHROID) 150 MCG tablet     Sig: Take 1 tablet by mouth daily     Dispense:  90 tablet     Refill:  3    lisinopril-hydroCHLOROthiazide (PRINZIDE;ZESTORETIC) 20-12.5 MG per tablet     Sig: Take 1 tablet by mouth daily     Dispense:  30 tablet     Refill:  3    metFORMIN (GLUCOPHAGE) 1000 MG tablet     Sig: bid     Dispense:  60 tablet     Refill:  5     Increased dose of Synthroid  Continue current dose of other medications follow-up in 3 to 6 months      Orders Placed This Encounter   Procedures    POCT Glucose     No orders of the defined types were placed in this encounter.    No follow-ups on file.  Subjective:     Chief Complaint   Patient presents with    Hypothyroidism     Hypothyroidism, unspecified type           Diabetes     Type 2 diabetes mellitus without complication, without long-term current use of insulin (Formerly [facility])      Vitals:    04/16/24 0917   BP: 113/67   Pulse: 83   SpO2: 92%   Weight: 108 kg (238 lb)   Height: 1.829 m (6')     Wt Readings

## 2024-04-18 DIAGNOSIS — E11.9 TYPE 2 DIABETES MELLITUS WITHOUT COMPLICATION, WITHOUT LONG-TERM CURRENT USE OF INSULIN (HCC): ICD-10-CM

## 2024-04-18 RX ORDER — GLIMEPIRIDE 2 MG/1
TABLET ORAL
Qty: 60 TABLET | Refills: 3 | Status: SHIPPED | OUTPATIENT
Start: 2024-04-18

## 2024-05-14 ENCOUNTER — OFFICE VISIT (OUTPATIENT)
Dept: UROLOGY | Age: 84
End: 2024-05-14
Payer: MEDICARE

## 2024-05-14 VITALS
WEIGHT: 230 LBS | OXYGEN SATURATION: 95 % | HEART RATE: 76 BPM | SYSTOLIC BLOOD PRESSURE: 122 MMHG | HEIGHT: 72 IN | DIASTOLIC BLOOD PRESSURE: 76 MMHG | BODY MASS INDEX: 31.15 KG/M2

## 2024-05-14 DIAGNOSIS — N13.8 BENIGN PROSTATIC HYPERPLASIA WITH URINARY OBSTRUCTION: Primary | ICD-10-CM

## 2024-05-14 DIAGNOSIS — N40.1 BENIGN PROSTATIC HYPERPLASIA WITH URINARY OBSTRUCTION: Primary | ICD-10-CM

## 2024-05-14 PROCEDURE — 99213 OFFICE O/P EST LOW 20 MIN: CPT | Performed by: PHYSICIAN ASSISTANT

## 2024-05-14 PROCEDURE — 3078F DIAST BP <80 MM HG: CPT | Performed by: PHYSICIAN ASSISTANT

## 2024-05-14 PROCEDURE — 1036F TOBACCO NON-USER: CPT | Performed by: PHYSICIAN ASSISTANT

## 2024-05-14 PROCEDURE — 3074F SYST BP LT 130 MM HG: CPT | Performed by: PHYSICIAN ASSISTANT

## 2024-05-14 PROCEDURE — G8427 DOCREV CUR MEDS BY ELIG CLIN: HCPCS | Performed by: PHYSICIAN ASSISTANT

## 2024-05-14 PROCEDURE — 1123F ACP DISCUSS/DSCN MKR DOCD: CPT | Performed by: PHYSICIAN ASSISTANT

## 2024-05-14 PROCEDURE — G8417 CALC BMI ABV UP PARAM F/U: HCPCS | Performed by: PHYSICIAN ASSISTANT

## 2024-05-14 ASSESSMENT — ENCOUNTER SYMPTOMS: APNEA: 0

## 2024-05-14 NOTE — PROGRESS NOTES
3-4 times per night, now he only gets up once per night. He has occasional post void leakage. He states that he denies frequency and urgency. PSA on 1/2/24 was 1.01.      Plan:      Continue flomax  Follow up in 6 months with flow pvr        Adams Alexander PA-C

## 2024-10-09 DIAGNOSIS — E11.9 TYPE 2 DIABETES MELLITUS WITHOUT COMPLICATION, WITHOUT LONG-TERM CURRENT USE OF INSULIN (HCC): ICD-10-CM

## 2024-10-09 DIAGNOSIS — E03.9 HYPOTHYROIDISM, UNSPECIFIED TYPE: ICD-10-CM

## 2024-10-09 LAB
ANION GAP SERPL CALCULATED.3IONS-SCNC: 7 MEQ/L (ref 9–15)
BUN SERPL-MCNC: 20 MG/DL (ref 8–23)
CALCIUM SERPL-MCNC: 8.9 MG/DL (ref 8.5–9.9)
CHLORIDE SERPL-SCNC: 95 MEQ/L (ref 95–107)
CO2 SERPL-SCNC: 29 MEQ/L (ref 20–31)
CREAT SERPL-MCNC: 1.05 MG/DL (ref 0.7–1.2)
ESTIMATED AVERAGE GLUCOSE: 180 MG/DL
GLUCOSE SERPL-MCNC: 314 MG/DL (ref 70–99)
HBA1C MFR BLD: 7.9 % (ref 4–6)
POTASSIUM SERPL-SCNC: 4.5 MEQ/L (ref 3.4–4.9)
SODIUM SERPL-SCNC: 131 MEQ/L (ref 135–144)
T4 FREE SERPL-MCNC: 1.53 NG/DL (ref 0.84–1.68)
TSH REFLEX: 0.52 UIU/ML (ref 0.44–3.86)

## 2024-10-13 DIAGNOSIS — E11.9 TYPE 2 DIABETES MELLITUS WITHOUT COMPLICATION, WITHOUT LONG-TERM CURRENT USE OF INSULIN (HCC): ICD-10-CM

## 2024-10-13 DIAGNOSIS — Z76.0 MEDICATION REFILL: ICD-10-CM

## 2024-10-15 ENCOUNTER — OFFICE VISIT (OUTPATIENT)
Dept: ENDOCRINOLOGY | Age: 84
End: 2024-10-15
Payer: MEDICARE

## 2024-10-15 VITALS
HEIGHT: 73 IN | DIASTOLIC BLOOD PRESSURE: 70 MMHG | WEIGHT: 234 LBS | HEART RATE: 66 BPM | OXYGEN SATURATION: 98 % | BODY MASS INDEX: 31.01 KG/M2 | SYSTOLIC BLOOD PRESSURE: 125 MMHG

## 2024-10-15 DIAGNOSIS — Z76.0 MEDICATION REFILL: ICD-10-CM

## 2024-10-15 DIAGNOSIS — E03.9 HYPOTHYROIDISM, UNSPECIFIED TYPE: ICD-10-CM

## 2024-10-15 DIAGNOSIS — E11.9 TYPE 2 DIABETES MELLITUS WITHOUT COMPLICATION, WITHOUT LONG-TERM CURRENT USE OF INSULIN (HCC): Primary | ICD-10-CM

## 2024-10-15 LAB
CHP ED QC CHECK: ABNORMAL
GLUCOSE BLD-MCNC: 333 MG/DL

## 2024-10-15 PROCEDURE — 3074F SYST BP LT 130 MM HG: CPT | Performed by: INTERNAL MEDICINE

## 2024-10-15 PROCEDURE — 1123F ACP DISCUSS/DSCN MKR DOCD: CPT | Performed by: INTERNAL MEDICINE

## 2024-10-15 PROCEDURE — 82962 GLUCOSE BLOOD TEST: CPT | Performed by: INTERNAL MEDICINE

## 2024-10-15 PROCEDURE — 3078F DIAST BP <80 MM HG: CPT | Performed by: INTERNAL MEDICINE

## 2024-10-15 PROCEDURE — G8427 DOCREV CUR MEDS BY ELIG CLIN: HCPCS | Performed by: INTERNAL MEDICINE

## 2024-10-15 PROCEDURE — G8417 CALC BMI ABV UP PARAM F/U: HCPCS | Performed by: INTERNAL MEDICINE

## 2024-10-15 PROCEDURE — 99213 OFFICE O/P EST LOW 20 MIN: CPT | Performed by: INTERNAL MEDICINE

## 2024-10-15 PROCEDURE — 3051F HG A1C>EQUAL 7.0%<8.0%: CPT | Performed by: INTERNAL MEDICINE

## 2024-10-15 PROCEDURE — 1036F TOBACCO NON-USER: CPT | Performed by: INTERNAL MEDICINE

## 2024-10-15 PROCEDURE — G8484 FLU IMMUNIZE NO ADMIN: HCPCS | Performed by: INTERNAL MEDICINE

## 2024-10-15 RX ORDER — LEVOTHYROXINE SODIUM 150 UG/1
150 TABLET ORAL DAILY
Qty: 90 TABLET | Refills: 3 | Status: SHIPPED | OUTPATIENT
Start: 2024-10-15

## 2024-10-15 RX ORDER — GLIMEPIRIDE 2 MG/1
TABLET ORAL
Qty: 60 TABLET | Refills: 3 | Status: SHIPPED | OUTPATIENT
Start: 2024-10-15

## 2024-10-15 RX ORDER — TAMSULOSIN HYDROCHLORIDE 0.4 MG/1
0.4 CAPSULE ORAL DAILY
Qty: 90 CAPSULE | Refills: 1 | Status: SHIPPED | OUTPATIENT
Start: 2024-10-15

## 2024-10-15 RX ORDER — CLOPIDOGREL BISULFATE 75 MG/1
TABLET ORAL
Qty: 30 TABLET | Refills: 6 | Status: SHIPPED | OUTPATIENT
Start: 2024-10-15

## 2024-10-15 RX ORDER — LISINOPRIL AND HYDROCHLOROTHIAZIDE 12.5; 2 MG/1; MG/1
1 TABLET ORAL DAILY
Qty: 30 TABLET | Refills: 3 | Status: SHIPPED | OUTPATIENT
Start: 2024-10-15

## 2024-10-15 NOTE — PROGRESS NOTES
(AMARYL) 2 MG tablet, Take 1 tablet by mouth twice daily, Disp: 60 tablet, Rfl: 3    clopidogrel (PLAVIX) 75 MG tablet, Take 1 tablet by mouth once daily, Disp: 30 tablet, Rfl: 06    tamsulosin (FLOMAX) 0.4 MG capsule, Take 1 capsule by mouth daily, Disp: 90 capsule, Rfl: 1    levothyroxine (SYNTHROID) 150 MCG tablet, Take 1 tablet by mouth daily, Disp: 90 tablet, Rfl: 3    lisinopril-hydroCHLOROthiazide (PRINZIDE;ZESTORETIC) 20-12.5 MG per tablet, Take 1 tablet by mouth daily, Disp: 30 tablet, Rfl: 3  Lab Results   Component Value Date     (L) 10/09/2024    K 4.5 10/09/2024    CL 95 10/09/2024    CO2 29 10/09/2024    BUN 20 10/09/2024    CREATININE 1.05 10/09/2024    GLUCOSE 314 (H) 10/09/2024    CALCIUM 8.9 10/09/2024    BILITOT <0.2 07/09/2021    ALKPHOS 67 07/09/2021    AST 17 07/09/2021    ALT 28 07/09/2021    LABGLOM 70.3 10/09/2024    GFRAA >60.0 09/15/2022    GLOB 3.4 07/09/2021     Lab Results   Component Value Date    WBC 7.0 07/10/2021    HGB 13.6 (L) 07/10/2021    HCT 40.7 (L) 07/10/2021    MCV 88.8 07/10/2021     07/10/2021     Lab Results   Component Value Date    LABA1C 7.9 (H) 10/09/2024    LABA1C 6.9 (H) 04/12/2024    LABA1C 7.0 (H) 01/02/2024     Lab Results   Component Value Date    HDL 36 (L) 07/10/2021    HDL 44 03/12/2019    HDL 45 02/23/2018    CHOL 191 07/10/2021    CHOL 150 03/12/2019    CHOL 173 02/23/2018    TRIG 106 07/10/2021    TRIG 82 03/12/2019    TRIG 102 02/23/2018     No results found for: \"TESTM\"  Lab Results   Component Value Date    TSH 0.516 10/09/2024    TSH 7.060 (H) 04/12/2024    TSH 3.670 01/02/2024    T4FREE 1.53 10/09/2024    T4FREE 1.15 04/12/2024    T4FREE 1.22 01/02/2024     No results found for: \"TPOABS\"    Review of Systems   Cardiovascular:  Negative for palpitations.   Endocrine: Negative for cold intolerance and heat intolerance.   Musculoskeletal:  Positive for arthralgias and gait problem.   Neurological:  Negative for tremors.   All other systems

## 2024-11-17 DIAGNOSIS — E11.9 TYPE 2 DIABETES MELLITUS WITHOUT COMPLICATION, WITHOUT LONG-TERM CURRENT USE OF INSULIN (HCC): ICD-10-CM

## 2024-11-17 DIAGNOSIS — Z76.0 MEDICATION REFILL: ICD-10-CM

## 2025-03-23 DIAGNOSIS — E11.9 TYPE 2 DIABETES MELLITUS WITHOUT COMPLICATION, WITHOUT LONG-TERM CURRENT USE OF INSULIN: ICD-10-CM

## 2025-03-23 DIAGNOSIS — Z76.0 MEDICATION REFILL: ICD-10-CM

## 2025-04-16 DIAGNOSIS — E03.9 HYPOTHYROIDISM, UNSPECIFIED TYPE: ICD-10-CM

## 2025-04-16 DIAGNOSIS — E11.9 TYPE 2 DIABETES MELLITUS WITHOUT COMPLICATION, WITHOUT LONG-TERM CURRENT USE OF INSULIN (HCC): ICD-10-CM

## 2025-04-16 LAB
ANION GAP SERPL CALCULATED.3IONS-SCNC: 8 MEQ/L (ref 9–15)
BUN SERPL-MCNC: 20 MG/DL (ref 8–23)
CALCIUM SERPL-MCNC: 8.9 MG/DL (ref 8.5–9.9)
CHLORIDE SERPL-SCNC: 97 MEQ/L (ref 95–107)
CO2 SERPL-SCNC: 27 MEQ/L (ref 20–31)
CREAT SERPL-MCNC: 0.94 MG/DL (ref 0.7–1.2)
GLUCOSE SERPL-MCNC: 120 MG/DL (ref 70–99)
POTASSIUM SERPL-SCNC: 4.2 MEQ/L (ref 3.4–4.9)
SODIUM SERPL-SCNC: 132 MEQ/L (ref 135–144)
T4 FREE SERPL-MCNC: 1.71 NG/DL (ref 0.84–1.68)
TSH REFLEX: 1.03 UIU/ML (ref 0.44–3.86)

## 2025-04-17 ENCOUNTER — OFFICE VISIT (OUTPATIENT)
Age: 85
End: 2025-04-17
Payer: MEDICARE

## 2025-04-17 VITALS
HEART RATE: 81 BPM | SYSTOLIC BLOOD PRESSURE: 102 MMHG | BODY MASS INDEX: 30.68 KG/M2 | HEIGHT: 73 IN | DIASTOLIC BLOOD PRESSURE: 60 MMHG | WEIGHT: 231.48 LBS

## 2025-04-17 DIAGNOSIS — E11.9 TYPE 2 DIABETES MELLITUS WITHOUT COMPLICATION, WITHOUT LONG-TERM CURRENT USE OF INSULIN: Primary | ICD-10-CM

## 2025-04-17 DIAGNOSIS — E03.9 HYPOTHYROIDISM, UNSPECIFIED TYPE: ICD-10-CM

## 2025-04-17 DIAGNOSIS — I10 PRIMARY HYPERTENSION: ICD-10-CM

## 2025-04-17 LAB
CHP ED QC CHECK: ABNORMAL
ESTIMATED AVERAGE GLUCOSE: 180 MG/DL
GLUCOSE BLD-MCNC: 264 MG/DL
HBA1C MFR BLD: 7.9 % (ref 4–6)

## 2025-04-17 PROCEDURE — 82962 GLUCOSE BLOOD TEST: CPT | Performed by: INTERNAL MEDICINE

## 2025-04-17 PROCEDURE — 1125F AMNT PAIN NOTED PAIN PRSNT: CPT | Performed by: INTERNAL MEDICINE

## 2025-04-17 PROCEDURE — 1123F ACP DISCUSS/DSCN MKR DOCD: CPT | Performed by: INTERNAL MEDICINE

## 2025-04-17 PROCEDURE — G8427 DOCREV CUR MEDS BY ELIG CLIN: HCPCS | Performed by: INTERNAL MEDICINE

## 2025-04-17 PROCEDURE — 99213 OFFICE O/P EST LOW 20 MIN: CPT | Performed by: INTERNAL MEDICINE

## 2025-04-17 PROCEDURE — 1036F TOBACCO NON-USER: CPT | Performed by: INTERNAL MEDICINE

## 2025-04-17 PROCEDURE — 99214 OFFICE O/P EST MOD 30 MIN: CPT | Performed by: INTERNAL MEDICINE

## 2025-04-17 PROCEDURE — 3051F HG A1C>EQUAL 7.0%<8.0%: CPT | Performed by: INTERNAL MEDICINE

## 2025-04-17 PROCEDURE — PBSHW POCT GLUCOSE: Performed by: INTERNAL MEDICINE

## 2025-04-17 PROCEDURE — 3074F SYST BP LT 130 MM HG: CPT | Performed by: INTERNAL MEDICINE

## 2025-04-17 PROCEDURE — 3078F DIAST BP <80 MM HG: CPT | Performed by: INTERNAL MEDICINE

## 2025-04-17 PROCEDURE — 1159F MED LIST DOCD IN RCRD: CPT | Performed by: INTERNAL MEDICINE

## 2025-04-17 PROCEDURE — G8417 CALC BMI ABV UP PARAM F/U: HCPCS | Performed by: INTERNAL MEDICINE

## 2025-04-17 RX ORDER — LISINOPRIL AND HYDROCHLOROTHIAZIDE 12.5; 2 MG/1; MG/1
1 TABLET ORAL DAILY
Qty: 30 TABLET | Refills: 5 | Status: SHIPPED | OUTPATIENT
Start: 2025-04-17

## 2025-04-17 RX ORDER — LEVOTHYROXINE SODIUM 150 UG/1
150 TABLET ORAL DAILY
Qty: 90 TABLET | Refills: 3 | Status: SHIPPED | OUTPATIENT
Start: 2025-04-17

## 2025-04-17 RX ORDER — GLIMEPIRIDE 2 MG/1
TABLET ORAL
Qty: 60 TABLET | Refills: 3 | Status: SHIPPED | OUTPATIENT
Start: 2025-04-17

## 2025-04-17 RX ORDER — CLOPIDOGREL BISULFATE 75 MG/1
TABLET ORAL
Qty: 30 TABLET | Refills: 6 | Status: SHIPPED | OUTPATIENT
Start: 2025-04-17

## 2025-04-17 ASSESSMENT — ENCOUNTER SYMPTOMS: VISUAL CHANGE: 0

## 2025-04-17 NOTE — PROGRESS NOTES
Lab Results   Component Value Date    HDL 36 (L) 07/10/2021    HDL 44 03/12/2019    HDL 45 02/23/2018    CHOL 191 07/10/2021    CHOL 150 03/12/2019    CHOL 173 02/23/2018    TRIG 106 07/10/2021    TRIG 82 03/12/2019    TRIG 102 02/23/2018     No results found for: \"TESTM\"  Lab Results   Component Value Date    TSH 1.030 04/16/2025    TSH 0.516 10/09/2024    TSH 7.060 (H) 04/12/2024    T4FREE 1.71 (H) 04/16/2025    T4FREE 1.53 10/09/2024    T4FREE 1.15 04/12/2024     No results found for: \"TPOABS\"    Review of Systems   Constitutional:  Positive for fatigue.   Endocrine: Negative for cold intolerance, heat intolerance, polydipsia and polyuria.   Musculoskeletal:  Positive for arthralgias and joint swelling.   All other systems reviewed and are negative.      Objective:   Physical Exam  Vitals reviewed.   Constitutional:       General: He is not in acute distress.     Appearance: Normal appearance. He is obese.   HENT:      Head: Normocephalic and atraumatic.      Right Ear: External ear normal.      Left Ear: External ear normal.      Nose: Nose normal.   Eyes:      General: No scleral icterus.        Right eye: No discharge.         Left eye: No discharge.      Extraocular Movements: Extraocular movements intact.      Conjunctiva/sclera: Conjunctivae normal.   Cardiovascular:      Rate and Rhythm: Normal rate and regular rhythm.      Heart sounds: Normal heart sounds.   Pulmonary:      Effort: Pulmonary effort is normal.      Breath sounds: Normal breath sounds.   Musculoskeletal:         General: Normal range of motion.      Cervical back: Normal range of motion and neck supple.   Skin:     Findings: No lesion or rash.   Neurological:      General: No focal deficit present.      Mental Status: He is alert and oriented to person, place, and time.   Psychiatric:         Mood and Affect: Mood is depressed. Affect is tearful.         Behavior: Behavior normal.

## (undated) DEVICE — HYPODERMIC SAFETY NEEDLE: Brand: MAGELLAN

## (undated) DEVICE — SYRINGE MED 10ML TRNSLUC BRL PLUNG BLK MRK POLYPR CTRL

## (undated) DEVICE — TUBING, SUCTION, 1/4" X 10', STRAIGHT: Brand: MEDLINE

## (undated) DEVICE — ELECTROSURGICAL PENCIL BUTTON SWITCH E-Z CLEAN COATED BLADE ELECTRODE 10 FT (3 M) CORD HOLSTER: Brand: MEGADYNE

## (undated) DEVICE — COUNTER NDL 40 COUNT HLD 70 FOAM BLK ADH W/ MAG

## (undated) DEVICE — ENTACT SEPTAL STAPLER 3 PACK: Brand: ENT SINUS

## (undated) DEVICE — GAUZE,SPONGE,2"X2",8PLY,STERILE,LF,2'S: Brand: MEDLINE

## (undated) DEVICE — SUTURE PERMAHAND SZ 2-0 L30IN NONABSORBABLE BLK L60MM KS 623H

## (undated) DEVICE — CODMAN® SURGICAL PATTIES 1/2" X 3" (1.27CM X 7.62CM): Brand: CODMAN®

## (undated) DEVICE — SPONGE GZ W4XL4IN COT 12 PLY TYP VII WVN C FLD DSGN

## (undated) DEVICE — KIT,ANTI FOG,W/SPONGE & FLUID,SOFT PACK: Brand: MEDLINE

## (undated) DEVICE — SUTURE CHROMIC GUT SZ 4-0 L18IN ABSRB BRN L13MM P-3 3/8 CIR 1654G

## (undated) DEVICE — ELECTRODE NDL L6.5IN S STL VERSATILE REUSE

## (undated) DEVICE — SPLINT 1524050 5PK PAIR DOYLE II AIRWAY: Brand: DOYLE II ™

## (undated) DEVICE — LABEL MED MINI W/ MARKER

## (undated) DEVICE — PAD N ADH W3XL4IN POLY COT SFT PERF FLM EASILY CUT ABSRB

## (undated) DEVICE — GAUZE,SPONGE,4"X4",16PLY,XRAY,STRL,LF: Brand: MEDLINE

## (undated) DEVICE — ELECTRODE PT RET AD L9FT HI MOIST COND ADH HYDRGEL CORDED

## (undated) DEVICE — INTENDED FOR TISSUE SEPARATION, AND OTHER PROCEDURES THAT REQUIRE A SHARP SURGICAL BLADE TO PUNCTURE OR CUT.: Brand: BARD-PARKER ® CARBON RIB-BACK BLADES

## (undated) DEVICE — BLADE 1882040HR 5PK M4 INF TURB 2MM ROT: Brand: STRAIGHTSHOT

## (undated) DEVICE — GLOVE ORANGE PI 7 1/2   MSG9075

## (undated) DEVICE — DBD-PACK,EENT,SIRUS,PK II: Brand: MEDLINE